# Patient Record
Sex: FEMALE | Race: BLACK OR AFRICAN AMERICAN | Employment: UNEMPLOYED | URBAN - METROPOLITAN AREA
[De-identification: names, ages, dates, MRNs, and addresses within clinical notes are randomized per-mention and may not be internally consistent; named-entity substitution may affect disease eponyms.]

---

## 2017-03-08 ENCOUNTER — APPOINTMENT (OUTPATIENT)
Dept: PHYSICAL THERAPY | Age: 52
End: 2017-03-08
Payer: COMMERCIAL

## 2017-03-13 ENCOUNTER — HOSPITAL ENCOUNTER (OUTPATIENT)
Dept: PHYSICAL THERAPY | Age: 52
Discharge: HOME | End: 2017-03-13
Payer: COMMERCIAL

## 2017-03-13 PROCEDURE — 97140 MANUAL THERAPY 1/> REGIONS: CPT

## 2017-03-13 PROCEDURE — 97162 PT EVAL MOD COMPLEX 30 MIN: CPT

## 2017-03-13 PROCEDURE — 97530 THERAPEUTIC ACTIVITIES: CPT

## 2017-03-13 PROCEDURE — 97110 THERAPEUTIC EXERCISES: CPT
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
## 2017-03-14 NOTE — PROGRESS NOTES
[facility] in [address] Geraldine [place] [address]  Phone: [phone]      Fax:  [phone] of Care/ Statement of Necessity for Physical Therapy Services       Patient name: Jamison Atkinson Start of Care: 3/13/2017   Referral source: Jadon Leone MD : 1965    Medical Diagnosis: Pain and swelling of left knee [M25.562, M25.462]   Onset Date:TKA , revision     Treatment Diagnosis: left knee pain   Prior Hospitalization: see medical history Provider#: [id number]   Medications: Verified on Patient summary List    Comorbidities: arthritis, latex allergy, high BP   Prior Level of Function: Normal function prior to fall and TKA in , since then limited function and mobility      The Plan of Care and following information is based on the information from the initial evaluation. Assessment/ key information: 54 YOF with chronic knee pain /dysfunction since  s/p TKA/revision/manipulation is presenting to PT with reports of constant pain ranging from 7-10/10, limited function (FOTO 18/100) and overall high pain focus. Objective findings include limited active/passive ROM, altered gait pattern without use of full ROM potential, deficit in strength and flexibility. Self reported FOTO score of 18/100 indicates marked limitation in function. Patient is a good candidate for skilled PT. Evaluation Complexity History MEDIUM  Complexity : 1-2 comorbidities / personal factors will impact the outcome/ POC ; Examination MEDIUM Complexity : 3 Standardized tests and measures addressing body structure, function, activity limitation and / or participation in recreation  ;Presentation MEDIUM Complexity : Evolving with changing characteristics  ; Clinical Decision Making HIGH Complexity : FOTO score of 1- 25   Overall Complexity Rating: MEDIUM  Problem List: decrease ROM, decrease strength, edema affecting function, impaired gait/ balance, decrease ADL/ functional abilitiies, decrease activity tolerance and decrease flexibility/ joint mobility   Treatment Plan may include any combination of the following: Therapeutic exercise, Therapeutic activities, Neuromuscular re-education, Physical agent/modality, Gait/balance training, Manual therapy and Patient education  Patient / Family readiness to learn indicated by: asking questions and trying to perform skills  Persons(s) to be included in education: patient (P)  Barriers to Learning/Limitations: None  Patient Goal (s): knee to bend  Patient Self Reported Health Status: fair  Rehabilitation Potential: good    Short Term Goals: To be accomplished in 3 weeks:   1. Patient has increased awareness of faulty gait pattern and is able to use current mobility to perform swing phase with knee Flex and heel strike with max knee Ext  Status at Eval: patient maintains 20 deg knee Flex during swing /stance phase limiting any functional mobility    2. Improved left knee ROM to >or= 15-90 deg for improved functional mobility with ADL  Status at Eval: AROM 20-80 deg left knee      Long Term Goals: To be accomplished in 6 weeks:   1. Improved FOTO score to >or= 47/100 points as evidence of improved function and ambulation  Status at Eval:FOTO 18/100    2. Improved strength LE to >or= 4/5 MMT for return to functional activities including housekeeping, stair climbing and limited squatting  Status at Eval:Left knee Ext 3-/5, Flex 4-/5    3. Patient reports reduction in pain with ADL and ambulation to <or= 5/10 for gradual return to PLOF  Status at Eval: constant pain ranging from 7-10/10    4. Improved left knee flexion to >lz=207 deg for increased ease with negotiation of curbs/stairs  Status at Eval: limited knee Flex, always leading with RLE to ascend stairs     Frequency / Duration: Patient to be seen 3 times per week for 6 weeks.     Patient/ Caregiver education and instruction: Diagnosis, prognosis, activity modification and exercises   [x] Plan of care has been reviewed with RA Israel, PT 3/13/2017 10:48 PM  _____________________________________________________________________  I certify that the above Therapy Services are being furnished while the patient is under my care. I agree with the treatment plan and certify that this therapy is necessary. Physician's Signature:____________________  Date:__________Time:______    Please sign and return to   [facility] in [address]  [address]  Phone: [phone]      Fax:  [phone]

## 2017-03-16 ENCOUNTER — HOSPITAL ENCOUNTER (OUTPATIENT)
Dept: PHYSICAL THERAPY | Age: 52
Discharge: HOME | End: 2017-03-16
Payer: COMMERCIAL

## 2017-03-16 PROCEDURE — 97140 MANUAL THERAPY 1/> REGIONS: CPT

## 2017-03-16 PROCEDURE — 97110 THERAPEUTIC EXERCISES: CPT

## 2017-03-17 ENCOUNTER — HOSPITAL ENCOUNTER (OUTPATIENT)
Dept: PHYSICAL THERAPY | Age: 52
Discharge: HOME | End: 2017-03-17
Payer: COMMERCIAL

## 2017-03-17 PROCEDURE — 97140 MANUAL THERAPY 1/> REGIONS: CPT

## 2017-03-17 PROCEDURE — 97110 THERAPEUTIC EXERCISES: CPT

## 2017-03-17 NOTE — PROGRESS NOTES
PT DAILY TREATMENT NOTE - [facility]     Patient Name: Inderjit Adames  Date:3/17/2017  : 1965  [x]  Patient  Verified  Payor: Min Parra / Plan: Taryn Ache / Product Type: HMO /    In time:255  Out time:340  Total Treatment Time (min): 45  Total Timed Codes (min): 45  1:1 Treatment Time ([address] only): na   Visit #: 3 of 18    Treatment Area: Pain and swelling of left knee [M25.562, M25.462]    SUBJECTIVE  Pain Level (0-10 scale): 7  Any medication changes, allergies to medications, adverse drug reactions, diagnosis change, or new procedure performed?: [x] No    [] Yes (see summary sheet for update)  Subjective functional status/changes:   [] No changes reported  Reports slightly less pain today, no change in mobility    OBJECTIVE        35 min Therapeutic Exercise:  [] See flow sheet :   Rationale: increase ROM, increase strength and improve coordination      10 min Manual Therapy:  Quad/hs pnf in sitting, STM to L quad and HS at end range flexion   Rationale: decrease pain, increase ROM and increase tissue extensibility       With   [] TE   [] TA   [] neuro   [] other: Patient Education: [x] Review HEP    [] Progressed/Changed HEP based on:   [] positioning   [] body mechanics   [] transfers   [] heat/ice application    [] other:      Other Objective/Functional Measures:   No increased symptoms with TE  Unable to make full revolution on bike       Pain Level (0-10 scale) post treatment: 5    ASSESSMENT/Changes in Function:   Good tolerance to TE and good response to MT with a decrease in symptoms. Patient will continue to benefit from skilled PT services to modify and progress therapeutic interventions, address functional mobility deficits, address ROM deficits and address strength deficits to attain remaining goals. [x]  See Plan of Care  []  See progress note/recertification  []  See Discharge Summary         Progress towards goals / Updated goals:  Short Term Goals:  To be accomplished in 3 weeks: 1. Patient has increased awareness of faulty gait pattern and is able to use current mobility to perform swing phase with knee Flex and heel strike with max knee Ext  Status at Eval: patient maintains 20 deg knee Flex during swing /stance phase limiting any functional mobility     2. Improved left knee ROM to >or= 15-90 deg for improved functional mobility with ADL  Status at Eval: AROM 20-80 deg left knee        Long Term Goals: To be accomplished in 6 weeks:  1. Improved FOTO score to >or= 47/100 points as evidence of improved function and ambulation  Status at Eval:FOTO 18/100     2. Improved strength LE to >or= 4/5 MMT for return to functional activities including housekeeping, stair climbing and limited squatting  Status at Eval:Left knee Ext 3-/5, Flex 4-/5     3. Patient reports reduction in pain with ADL and ambulation to <or= 5/10 for gradual return to PLOF  Status at Eval: constant pain ranging from 7-10/10     4.  Improved left knee flexion to >bp=637 deg for increased ease with negotiation of curbs/stairs  Status at Eval: limited knee Flex, always leading with RLE to ascend stairs        PLAN  [x]  Upgrade activities as tolerated     [x]  Continue plan of care  []  Update interventions per flow sheet       []  Discharge due to:_  []  Other:_      Mikayla Anderson PTA 3/17/2017  3:51 PM    Future Appointments  Date Time Provider Roland Hoff   3/20/2017 11:00 AM RA Basilio [facility]   3/21/2017 10:00 AM BEBETO Garcia [facility]   3/24/2017 11:00 AM RA Basilio [facility]   3/28/2017 11:00 AM RA Basilio [facility]   3/29/2017 3:00 PM BEBETO Garcia [facility]   3/31/2017 11:00 AM RA Basilio [facility]

## 2017-03-20 ENCOUNTER — HOSPITAL ENCOUNTER (OUTPATIENT)
Dept: PHYSICAL THERAPY | Age: 52
Discharge: HOME | End: 2017-03-20
Payer: COMMERCIAL

## 2017-03-20 PROCEDURE — 97110 THERAPEUTIC EXERCISES: CPT

## 2017-03-20 PROCEDURE — 97140 MANUAL THERAPY 1/> REGIONS: CPT

## 2017-03-20 NOTE — PROGRESS NOTES
PT DAILY TREATMENT NOTE - [facility]     Patient Name: Don Castillo  Date:3/20/2017  : 1965  [x]  Patient  Verified  Payor: Krissy Kaur / Plan: Gregory Guerra / Product Type: HMO /    In time:1025  Out time:1120  Total Treatment Time (min): 55  Total Timed Codes (min): 55  1:1 Treatment Time ( only): na   Visit #: 4 of 18    Treatment Area: Pain and swelling of left knee [M25.562, M25.462]    SUBJECTIVE  Pain Level (0-10 scale): 0  Any medication changes, allergies to medications, adverse drug reactions, diagnosis change, or new procedure performed?: [x] No    [] Yes (see summary sheet for update)  Subjective functional status/changes:   [] No changes reported  Very active over the weekend, stiffness continues but has no pain    OBJECTIVE      45 min Therapeutic Exercise:  [x] See flow sheet :   Rationale: increase ROM, increase strength and improve coordination     10 min Manual Therapy:  Functional massage to L quad and HS in sitting   Rationale: increase ROM and increase tissue extensibility           With   [] TE   [] TA   [] neuro   [] other: Patient Education: [x] Review HEP    [] Progressed/Changed HEP based on:   [] positioning   [] body mechanics   [] transfers   [] heat/ice application    [] other:      Other Objective/Functional Measures:   PROM flex ~90deg       Pain Level (0-10 scale) post treatment: 0    ASSESSMENT/Changes in Function:   Progressing well with decrease of pain. Shows more normalized gait pattern with minimal deviations. Limitations in overall ROM persist    Patient will continue to benefit from skilled PT services to modify and progress therapeutic interventions, address functional mobility deficits, address ROM deficits and address strength deficits to attain remaining goals. [x]  See Plan of Care  []  See progress note/recertification  []  See Discharge Summary         Progress towards goals / Updated goals:  Short Term Goals: To be accomplished in 3 weeks:  1. Patient has increased awareness of faulty gait pattern and is able to use current mobility to perform swing phase with knee Flex and heel strike with max knee Ext  Status at Eval: patient maintains 20 deg knee Flex during swing /stance phase limiting any functional mobility  Current: pt freely moves out of flexed position      2. Improved left knee ROM to >or= 15-90 deg for improved functional mobility with ADL  Status at Eval: AROM 20-80 deg left knee  Current: AROM 90deg flex          Long Term Goals: To be accomplished in 6 weeks:  1. Improved FOTO score to >or= 47/100 points as evidence of improved function and ambulation  Status at Eval:FOTO 18/100  Current: NT      2. Improved strength LE to >or= 4/5 MMT for return to functional activities including housekeeping, stair climbing and limited squatting  Status at Eval:Left knee Ext 3-/5, Flex 4-/5  Current: NT      3. Patient reports reduction in pain with ADL and ambulation to <or= 5/10 for gradual return to PLOF  Status at Eval: constant pain ranging from 7-10/10     current: 0/10    4.  Improved left knee flexion to >vh=973 deg for increased ease with negotiation of curbs/stairs  Status at Eval: limited knee Flex, always leading with RLE to ascend stairs   Current:NT    PLAN  [x]  Upgrade activities as tolerated     [x]  Continue plan of care  []  Update interventions per flow sheet       []  Discharge due to:_  []  Other:_      Peace Asencio PTA 3/20/2017  11:28 AM    Future Appointments  Date Time Provider Roland Hoff   3/24/2017 11:00 AM RA Castañeda [facility]   3/28/2017 11:00 AM RA Castañeda [facility]   3/29/2017 3:00 PM Anette Hetty Castleman MIHPTD [facility]   3/31/2017 11:00 AM RA Castañeda [facility]

## 2017-03-21 ENCOUNTER — APPOINTMENT (OUTPATIENT)
Dept: PHYSICAL THERAPY | Age: 52
End: 2017-03-21
Payer: COMMERCIAL

## 2017-03-24 ENCOUNTER — HOSPITAL ENCOUNTER (OUTPATIENT)
Dept: PHYSICAL THERAPY | Age: 52
Discharge: HOME | End: 2017-03-24
Payer: COMMERCIAL

## 2017-03-24 PROCEDURE — 97110 THERAPEUTIC EXERCISES: CPT

## 2017-03-24 PROCEDURE — 97140 MANUAL THERAPY 1/> REGIONS: CPT

## 2017-03-24 NOTE — PROGRESS NOTES
PT DAILY TREATMENT NOTE - [facility]     Patient Name: Ariel Jeffers  Date:3/24/2017  : 1965  [x]  Patient  Verified  Payor: Laura Pineda / Plan: Bradley Massey / Product Type: HMO /    In time:115  Out time:1205  Total Treatment Time (min): 50  Total Timed Codes (min): 50  1:1 Treatment Time ( only): na   Visit #: 5 of 18    Treatment Area: Pain and swelling of left knee [M25.562, M25.462]    SUBJECTIVE  Pain Level (0-10 scale): 4  Any medication changes, allergies to medications, adverse drug reactions, diagnosis change, or new procedure performed?: [x] No    [] Yes (see summary sheet for update)  Subjective functional status/changes:   [] No changes reported  Reports improvement in function and decrease in pain but limitations persist on ROM along with intermittent muscle spasms    OBJECTIVE    40 min Therapeutic Exercise:  [] See flow sheet :   Rationale: increase ROM, increase strength and improve coordination      10 min Manual Therapy:  functional massage to L quad in sitting. Distraction to L knee in sititng   Rationale: decrease pain, increase ROM and increase tissue extensibility           With   [] TE   [] TA   [] neuro   [] other: Patient Education: [x] Review HEP    [] Progressed/Changed HEP based on:   [] positioning   [] body mechanics   [] transfers   [] heat/ice application    [] other:      Other Objective/Functional Measures:   AROM/PROM  80-85 deg flex  -20 to -15 Ext  foto 44/100  Seat 15 1/2 on bike  -       Pain Level (0-10 scale) post treatment: 2    ASSESSMENT/Changes in Function:   Min change in ROM. improved gait pattern and general mobility with TE    Patient will continue to benefit from skilled PT services to modify and progress therapeutic interventions, address functional mobility deficits, address ROM deficits and address strength deficits to attain remaining goals.      [x]  See Plan of Care  []  See progress note/recertification  []  See Discharge Summary Progress towards goals / Updated goals:  Short Term Goals: To be accomplished in 3 weeks:  1. Patient has increased awareness of faulty gait pattern and is able to use current mobility to perform swing phase with knee Flex and heel strike with max knee Ext  Status at Eval: patient maintains 20 deg knee Flex during swing /stance phase limiting any functional mobility  Current: pt freely moves out of flexed position      2. Improved left knee ROM to >or= 15-90 deg for improved functional mobility with ADL  Status at Eval: AROM 20-80 deg left knee  Current: PROM -15 to 85deg          Long Term Goals: To be accomplished in 6 weeks:  1. Improved FOTO score to >or= 47/100 points as evidence of improved function and ambulation  Status at Eval:FOTO 18/100  Current: 44/100      2. Improved strength LE to >or= 4/5 MMT for return to functional activities including housekeeping, stair climbing and limited squatting  Status at Eval:Left knee Ext 3-/5, Flex 4-/5  Current: NT      3. Patient reports reduction in pain with ADL and ambulation to <or= 5/10 for gradual return to PLOF  Status at Eval: constant pain ranging from 7-10/10     current: 0-4/10     4.  Improved left knee flexion to >je=230 deg for increased ease with negotiation of curbs/stairs  Status at Eval: limited knee Flex, always leading with RLE to ascend stairs   Current:NT       PLAN  [x]  Upgrade activities as tolerated     [x]  Continue plan of care  []  Update interventions per flow sheet       []  Discharge due to:_  []  Other:_      Sachin Ely, PTA 3/24/2017  12:20 PM    Future Appointments  Date Time Provider Roland Hoff   3/28/2017 11:00 AM BEBETO Garcia [facility]   3/29/2017 3:00 PM BEBETO Garcia [facility]   3/31/2017 11:00 AM BEBETO Garcia [facility]

## 2017-03-28 ENCOUNTER — APPOINTMENT (OUTPATIENT)
Dept: PHYSICAL THERAPY | Age: 52
End: 2017-03-28
Payer: COMMERCIAL

## 2017-03-29 ENCOUNTER — APPOINTMENT (OUTPATIENT)
Dept: PHYSICAL THERAPY | Age: 52
End: 2017-03-29
Payer: COMMERCIAL

## 2017-03-31 ENCOUNTER — APPOINTMENT (OUTPATIENT)
Dept: PHYSICAL THERAPY | Age: 52
End: 2017-03-31
Payer: COMMERCIAL

## 2017-04-03 ENCOUNTER — HOSPITAL ENCOUNTER (OUTPATIENT)
Dept: PHYSICAL THERAPY | Age: 52
Discharge: HOME | End: 2017-04-03
Payer: COMMERCIAL

## 2017-04-03 PROCEDURE — 97110 THERAPEUTIC EXERCISES: CPT

## 2017-04-03 NOTE — PROGRESS NOTES
PT DAILY TREATMENT NOTE - [facility]     Patient Name: Henry Simmons  Date:4/3/2017  : 1965  [x]  Patient  Verified  Payor: Terrance Bryant / Plan: Martha Fierro / Product Type: HMO /    In time:930  Out time:1015  Total Treatment Time (min): 45  Total Timed Codes (min): 45  1:1 Treatment Time ([address] only): na   Visit #: 6 of 18    Treatment Area: Pain and swelling of left knee [M25.562, M25.462]    SUBJECTIVE  Pain Level (0-10 scale): 5  Any medication changes, allergies to medications, adverse drug reactions, diagnosis change, or new procedure performed?: [x] No    [] Yes (see summary sheet for update)  Subjective functional status/changes:   [] No changes reported  Reports increase of pain due to in activity. \"I was stuck in a class room all week\"    OBJECTIVE       45 min Therapeutic Exercise:  [] See flow sheet :   Rationale: increase ROM, increase strength and improve coordination              With   [] TE   [] TA   [] neuro   [] other: Patient Education: [x] Review HEP    [] Progressed/Changed HEP based on:   [] positioning   [] body mechanics   [] transfers   [] heat/ice application    [] other:      Other Objective/Functional Measures:   none     Pain Level (0-10 scale) post treatment: 0    ASSESSMENT/Changes in Function:   Deficit in hip abd strength as patient compensates during lateral step ups. Patient will continue to benefit from skilled PT services to modify and progress therapeutic interventions, address functional mobility deficits, address ROM deficits and address strength deficits to attain remaining goals. [x]  See Plan of Care  []  See progress note/recertification  []  See Discharge Summary         Progress towards goals / Updated goals:  Short Term Goals: To be accomplished in 3 weeks:  1.  Patient has increased awareness of faulty gait pattern and is able to use current mobility to perform swing phase with knee Flex and heel strike with max knee Ext  Status at Eval: patient maintains 20 deg knee Flex during swing /stance phase limiting any functional mobility  Current: pt freely moves out of flexed position      2. Improved left knee ROM to >or= 15-90 deg for improved functional mobility with ADL  Status at Eval: AROM 20-80 deg left knee  Current: PROM -15 to 85deg          Long Term Goals: To be accomplished in 6 weeks:  1. Improved FOTO score to >or= 47/100 points as evidence of improved function and ambulation  Status at Eval:FOTO 18/100  Current: 44/100      2. Improved strength LE to >or= 4/5 MMT for return to functional activities including housekeeping, stair climbing and limited squatting  Status at Eval:Left knee Ext 3-/5, Flex 4-/5  Current: NT      3. Patient reports reduction in pain with ADL and ambulation to <or= 5/10 for gradual return to PLOF  Status at Eval: constant pain ranging from 7-10/10     current: 0-4/10      4.  Improved left knee flexion to >cw=957 deg for increased ease with negotiation of curbs/stairs  Status at Eval: limited knee Flex, always leading with RLE to ascend stairs   Current:NT       PLAN  [x]  Upgrade activities as tolerated     [x]  Continue plan of care  []  Update interventions per flow sheet       []  Discharge due to:_  []  Other:_      Maame Davis, RA 4/3/2017  9:47 AM    Future Appointments  Date Time Provider Roland Hoff   4/6/2017 8:00 AM Alfredo Scott, PT [facility]

## 2017-04-06 ENCOUNTER — HOSPITAL ENCOUNTER (OUTPATIENT)
Dept: PHYSICAL THERAPY | Age: 52
Discharge: HOME | End: 2017-04-06
Payer: COMMERCIAL

## 2017-04-06 PROCEDURE — 97110 THERAPEUTIC EXERCISES: CPT

## 2017-04-06 PROCEDURE — 97112 NEUROMUSCULAR REEDUCATION: CPT

## 2017-04-06 NOTE — PROGRESS NOTES
PT DAILY TREATMENT NOTE     Patient Name: Leif Larios  Date:2017  : 1965  [x]  Patient  Verified  Payor: Aguila Villarreal / Plan: Elizabeth Bustillo / Product Type: HMO /    In time:8:04  Out time:9:10  Total Treatment Time (min): 66  Visit #: 7 of 18    Treatment Area: Pain and swelling of left knee [M25.562, M25.462]    SUBJECTIVE  Pain Level (0-10 scale): 7/10  Any medication changes, allergies to medications, adverse drug reactions, diagnosis change, or new procedure performed?: [x] No    [] Yes (see summary sheet for update)  Subjective functional status/changes:   [] No changes reported  [facility] fell 2x in  and had a patient kick my left knee several times after surgery. Revision of left TKR in May 2016. \"    OBJECTIVE    Modality rationale: decrease edema, decrease inflammation and decrease pain to improve the patients ability to tolerate positions and ADLs.    Min Type Additional Details    [] Estim:  []Unatt       []IFC  []Premod                        []Other:  []w/ice   []w/heat  Position:  Location:    [] Estim: []Att    []TENS instruct  []NMES                    []Other:  []w/US   []w/ice   []w/heat  Position:  Location:    []  Traction: [] Cervical       []Lumbar                       [] Prone          []Supine                       []Intermittent   []Continuous Lbs:  [] before manual  [] after manual    []  Ultrasound: []Continuous   [] Pulsed                           []1MHz   []3MHz W/cm2:  Location:    []  Iontophoresis with dexamethasone         Location: [] Take home patch   [] In clinic   10 [x]  Ice     []  heat  []  Ice massage  []  Laser   []  Anodyne Position:   Location:    []  Laser with stim  []  Other:  Position:  Location:    []  Vasopneumatic Device Pressure:       [] lo [] med [] hi   Temperature: [] lo [] med [] hi   [] Skin assessment post-treatment:  []intact []redness- no adverse reaction    []redness - adverse reaction:      min []Eval []Re-Eval       26 min Therapeutic Exercise:  [x] See flow sheet :  Increased load of leg press to 40 lbs. Rationale: increase ROM, increase strength, improve coordination and increase proprioception to improve the patients ability to normalize ADLs. min Therapeutic Activity:  []  See flow sheet :        30 min Neuromuscular Re-education:  [x]  See flow sheet :  Assessment of left knee neuromuscular dysfunction tender points and treatment of PAT (5:00, 6:00), MM, LH tender points using strain counterstrain. Rationale: improve coordination, increase proprioception and decrease pain  to improve the patients ability to tolerate positions and ADLs. min Manual Therapy:          min Gait Training:  ___ feet with ___ device on level surfaces with ___ level of assist   Rationale: With   [] TE   [] TA   [] neuro   [] other: Patient Education: [x] Review HEP    [] Progressed/Changed HEP based on:   [] positioning   [] body mechanics   [] transfers   [] heat/ice application    [] other:      Other Objective/Functional Measures:   (+) neuromuscular dysfunction tender points at PAT (5:00*, 6:00*), MM*, LM, LH*, PCL, medial ACL, lateral EXA, PES, lateral PTE      Pain Level (0-10 scale) post treatment: 0/10    ASSESSMENT/Changes in Function:   Abolished MM, PAT (6:00, 5:00) tender point, 30% reduction LH tender point, PES abolished after treating MM, LH tender points. Pt's pain abolished after strain counterstrain and exercises. Patient will continue to benefit from skilled PT services to modify and progress therapeutic interventions, address functional mobility deficits, address ROM deficits, address strength deficits, analyze and address soft tissue restrictions, analyze and cue movement patterns, analyze and modify body mechanics/ergonomics and instruct in home and community integration to attain remaining goals.      []  See Plan of Care  []  See progress note/recertification  []  See Discharge Summary         Progress towards goals / Updated goals:  Short Term Goals: To be accomplished in 3 weeks:  1. Patient has increased awareness of faulty gait pattern and is able to use current mobility to perform swing phase with knee Flex and heel strike with max knee Ext  Status at Eval: patient maintains 20 deg knee Flex during swing /stance phase limiting any functional mobility  Current: pt freely moves out of flexed position      2. Improved left knee ROM to >or= 15-90 deg for improved functional mobility with ADL  Status at Eval: AROM 20-80 deg left knee  Current: PROM -15 to 85deg          Long Term Goals: To be accomplished in 6 weeks:  1. Improved FOTO score to >or= 47/100 points as evidence of improved function and ambulation  Status at Eval: FOTO 18/100  Current: FOTO: 44/100      2. Improved strength LE to >or= 4/5 MMT for return to functional activities including housekeeping, stair climbing and limited squatting  Status at Eval:Left knee Ext 3-/5, Flex 4-/5  Current: NT      3. Patient reports reduction in pain with ADL and ambulation to <or= 5/10 for gradual return to PLOF  Status at Eval: constant pain ranging from 7-10/10  Current: 0-4/10      4.  Improved left knee flexion to >rj=314 deg for increased ease with negotiation of curbs/stairs  Status at Eval: limited knee Flex, always leading with RLE to ascend stairs   Current:NT    PLAN  []  Upgrade activities as tolerated     []  Continue plan of care  []  Update interventions per flow sheet       []  Discharge due to:_  []  Other:_      Kary Garcia PT 4/6/2017  8:09 AM    Future Appointments  Date Time Provider Roland Hoff   4/11/2017 4:30 PM BEBETO Brito [facility]   4/13/2017 4:30 PM BEBETO Garcia [facility]

## 2017-04-11 ENCOUNTER — HOSPITAL ENCOUNTER (OUTPATIENT)
Dept: PHYSICAL THERAPY | Age: 52
Discharge: HOME | End: 2017-04-11
Payer: COMMERCIAL

## 2017-04-11 PROCEDURE — 97164 PT RE-EVAL EST PLAN CARE: CPT

## 2017-04-11 PROCEDURE — 97112 NEUROMUSCULAR REEDUCATION: CPT

## 2017-04-11 NOTE — PROGRESS NOTES
PT DAILY TREATMENT NOTE     Patient Name: Wendy Evans  Date:2017  : 1965  [x]  Patient  Verified  Payor: Breana Sequeira / Plan: Juan A Ohara / Product Type: HMO /    In time:4:40  Out time:5:55  Total Treatment Time (min): 75  Visit #: 8 of 18    Treatment Area: Pain and swelling of left knee [M25.562, M25.462]    SUBJECTIVE  Pain Level (0-10 scale): 8/10  Any medication changes, allergies to medications, adverse drug reactions, diagnosis change, or new procedure performed?: [x] No    [] Yes (see summary sheet for update)  Subjective functional status/changes:   [] No changes reported  \"I had a total knee replacement on this knee 2 years ago and it still hurts. I get these spasms in my knee that always come and go and they're really bad today.      OBJECTIVE    Modality rationale:    Min Type Additional Details    [] Estim:  []Unatt       []IFC  []Premod                        []Other:  []w/ice   []w/heat  Position:  Location:    [] Estim: []Att    []TENS instruct  []NMES                    []Other:  []w/US   []w/ice   []w/heat  Position:  Location:    []  Traction: [] Cervical       []Lumbar                       [] Prone          []Supine                       []Intermittent   []Continuous Lbs:  [] before manual  [] after manual    []  Ultrasound: []Continuous   [] Pulsed                           []1MHz   []3MHz W/cm2:  Location:    []  Iontophoresis with dexamethasone         Location: [] Take home patch   [] In clinic    []  Ice     []  heat  []  Ice massage  []  Laser   []  Anodyne Position:  Location:    []  Laser with stim  []  Other:  Position:  Location:    []  Vasopneumatic Device Pressure:       [] lo [] med [] hi   Temperature: [] lo [] med [] hi   [] Skin assessment post-treatment:  []intact []redness- no adverse reaction    []redness - adverse reaction:     30 min []Eval                  [x]Re-Eval  Discussed positioning and pt's knee surgery history and continuing plan of care.       5 min Therapeutic Exercise:  [x] See flow sheet :   Rationale: increase ROM and increase strength to improve the patients ability to bend her knee. min Therapeutic Activity:  []  See flow sheet :        42 min Neuromuscular Re-education:  [x]  See flow sheet :  Assessment of left knee neuromuscular dysfunction tender points and treatment of lateral PTE, PCL, LM, medial ACL, and lateral EXA tender points using strain counterstrain. Rationale: increase proprioception and decrease pain  to improve the patients ability to tolerate positions and ADLs. min Manual Therapy:          min Gait Training:  ___ feet with ___ device on level surfaces with ___ level of assist   Rationale: With   [] TE   [] TA   [] neuro   [] other: Patient Education: [x] Review HEP    [] Progressed/Changed HEP based on:   [] positioning   [] body mechanics   [] transfers   [] heat/ice application    [] other:      Other Objective/Functional Measures:   (+) neuromuscular dysfunction tender points at PAT (5:00*, 6:00*), MM*, LM*, LH*, PCL*, medial ACL*, lateral EXA*, PES, lateral PTE*  left LE shortens in sitting- posteriorly rotated right innominate  right standing flexion test  PROM left knee: 0-75 degrees  AROM knee extension: -7 degrees  Strength hip flexors: 4/5 (improved 1 grade on left, no change on right)  Hip extensors (sitting): 5/5 bilaterally  Hip adductors: 5/5 bilaterally  Hip abductors: 5/5 bilaterally  Knee extension: 4+/5 (improved 1 2/3 grade on left)  Knee flexors: 3/5  Ankle DF: 5/5       Pain Level (0-10 scale) post treatment: 1-2/10    ASSESSMENT/Changes in Function:    Abolished lateral PTE, PCL, LM, medial ACL tender points, 80-90% reduction of lateral EXA tender point after strain counterstrain today.   Pt's AROM/PROM of the left knee flexion continues to be significantly limited due to pain, but her knee extension ROM has significantly improved, but with pain at end of knee extension ROM (PROM left knee: 0-75 degrees). Multiple neuromuscular dysfunction tender points were successfully treated using strain counterstrain allowing pt to experience up to 3 days of abolished knee pain after treating approximately 50% of the neuromuscular dysfunctions. All neuromuscular dysfunction tender points were successfully treated at the time of this progress note resulting in a left knee pain reduction from 8/10 to 0-1/10. Pt would benefit from additional skilled PT to address remaining strength and ROM deficits. Patient will continue to benefit from skilled PT services to modify and progress therapeutic interventions, address functional mobility deficits, address ROM deficits, address strength deficits, analyze and address soft tissue restrictions, analyze and cue movement patterns, analyze and modify body mechanics/ergonomics, assess and modify postural abnormalities and instruct in home and community integration to attain remaining goals. []  See Plan of Care  []  See progress note/recertification  []  See Discharge Summary         Progress towards goals / Updated goals:  Short Term Goals: To be accomplished in 3 weeks:  1. Patient has increased awareness of faulty gait pattern and is able to use current mobility to perform swing phase with knee Fl ex and heel strike with max knee Ext  Status at Eval: patient maintains 20 deg knee Flex during swing /stance phase limiting any functional mobility  Current: progressing (pt freely moves out of flexed position)      2. Improved left knee ROM to >or= 15-90 deg for improved functional mobility with ADL  Status at Eval: AROM 20-80 deg left knee  Current: met for knee extension, regressed for knee flexion (PROM left knee: 0-75 degrees) 4/11/17      Long Term Goals: To be accomplished in 6 weeks:  1. Improved FOTO score to >or= 47/100 points as evidence of improved function and ambulation  Status at Eval: FOTO 18/100  Current: FOTO: 44/100      2.  Improved strength LE to >or= 4/5 MMT for return to functional activities including housekeeping, stair climbing and limited squatting  Status at Eval:Left knee Ext 3-/5, Flex 4-/5  Current: progressing (Strength hip flexors: 4/5 (improved 1 grade on left, no change on right), Hip extensors (sitting): 5/5 bilaterally, Hip adductors: 5/5 bilaterally, Hip abductors: 5/5 bilaterally, Knee extension: 4+/5 (improved 1 2/3 grade on left), Knee flexors: 3/5, Ankle DF: 5/5) 4/11/17      3. Patient reports reduction in pain with ADL and ambulation to <or= 5/10 for gradual return to PLOF  Status at Eval: constant pain ranging from 7-10/10  Current: progressing (pain 0-4/10 at worst)      4.  Improved left knee flexion to >tz=791 deg for increased ease with negotiation of curbs/stairs  Status at Eval: limited knee Flex, always leading with RLE to ascend stairs   Current: regressed (PROM left knee: 0-75 degrees) 4/11/17    PLAN  []  Upgrade activities as tolerated     [x]  Continue plan of care  []  Update interventions per flow sheet       []  Discharge due to:_  []  Other:_      Cordelia Kaba PT 4/11/2017  4:49 PM    Future Appointments  Date Time Provider Roland Hoff   4/13/2017 4:30 PM Vandana Flaherty, PT [facility]

## 2017-04-11 NOTE — PROGRESS NOTES
[facility] in [address] [place] [address]  Phone: [phone] Fax: [phone]    Physical Therapy Progress Note  Patient name: Jersey Gamino Start of Care: 3/13/17   Referral source: Jolly Bhardwaj MD : 1965   Medical/Treatment Diagnosis: Pain and swelling of left knee [M25.562, M25.462] S/P TKR revision Onset Date: TKA , revision    Prior Hospitalization: see medical history Provider#: [id number]   Medications: Verified on Patient Summary List    Comorbidities: arthritis, latex allergy, high BP  Prior Level of Function: Normal function prior to fall and TKA in , since then limited function and mobility  Visits from Start of Care: 8     Missed Visits: 0    Established Goals:         Excellent           Good         Limited           None  [x] Increased ROM   []  [x] (extension)[x] (flexion) []  [x] Increased Strength  []  [x]  []  []  [x] Increased Mobility  []  [x]  [x]  []   [x] Decreased Pain   []  [x]  [x]  []  [] Decreased Swelling  []  []  []  []    Key Functional Changes:   Abolished lateral PTE, PCL, LM, medial ACL tender points, 80-90% reduction of lateral EXA tender point after strain counterstrain today. Pt's AROM/PROM of the left knee flexion continues to be significantly limited due to pain, but her knee extension ROM has significantly improved, but with pain at end of knee extension ROM (PROM left knee: 0-75 degrees). Multiple neuromuscular dysfunction tender points were successfully treated using strain counterstrain allowing pt to experience up to 3 days of abolished knee pain after treating approximately 50% of the neuromuscular dysfunctions. All neuromuscular dysfunction tender points were successfully treated at the time of this progress note resulting in a left knee pain reduction from 8/10 to 0-1/10. Goals addressed this period:  Short Term Goals: To be accomplished in 3 weeks:  1.  Patient has increased awareness of faulty gait pattern and is able to use current mobility to perform swing phase with knee Fl ex and heel strike with max knee Ext  Status at Eval: patient maintains 20 deg knee Flex during swing /stance phase limiting any functional mobility  Current: progressing (pt freely moves out of flexed position)      2. Improved left knee ROM to >or= 15-90 deg for improved functional mobility with ADL  Status at Eval: AROM 20-80 deg left knee  Current: met for knee extension, regressed for knee flexion (PROM left knee: 0-75 degrees)      Long Term Goals: To be accomplished in 6 weeks:  1. Improved FOTO score to >or= 47/100 points as evidence of improved function and ambulation  Status at Eval: FOTO 18/100  Current: nearly met (FOTO: 44/100)      2. Improved strength LE to >or= 4/5 MMT for return to functional activities including housekeeping, stair climbing and limited squatting  Status at Eval:Left knee Ext 3-/5, Flex 4-/5  Current: progressing (Strength hip flexors: 4/5 (improved 1 grade on left, no change on right), Hip extensors (sitting): 5/5 bilaterally, Hip adductors: 5/5 bilaterally, Hip abductors: 5/5 bilaterally, Knee extension: 4+/5 (improved 1 2/3 grade on left), Knee flexors: 3/5, Ankle DF: 5/5)      3. Patient reports reduction in pain with ADL and ambulation to <or= 5/10 for gradual return to PLOF  Status at Eval: constant pain ranging from 7-10/10  Current: progressing (pain 0-4/10 at worst)      4. Improved left knee flexion to >sm=909 deg for increased ease with negotiation of curbs/stairs  Status at Eval: limited knee Flex, always leading with RLE to ascend stairs   Current: regressed (PROM left knee: 0-75 degrees)    Updated Goals: to be achieved in 4 weeks:   1) Continue with unmet goals above. ASSESSMENT/RECOMMENDATIONS:   Pt would benefit from additional skilled PT to address remaining strength and ROM deficits.     [x]Continue therapy per initial plan/protocol at a frequency of  2 x per week for 4 weeks  []Continue therapy with the following recommended changes:_____________________      _____________________________________________________________________  []Discontinue therapy progressing towards or have reached established goals  []Discontinue therapy due to lack of appreciable progress towards goals  []Discontinue therapy due to lack of attendance or compliance  []Await Physician's recommendations/decisions regarding therapy  []Other:________________________________________________________________    Thank you for this referral.   Boyd Tomas, PT 4/11/2017 7:20 PM    NOTE TO PHYSICIAN:  PLEASE COMPLETE THE ORDERS BELOW AND   FAX TO [facility]: [phone]  If you are unable to process this request in 24 hours please contact our office: [phone]    ____ I have read the above report and request that my patient continue therapy with the following changes/special instructions:  ____ I have read the above report and request that my patient be discharged from therapy    Physician's Signature:_____________________ Date:___________Time:__________

## 2017-04-13 ENCOUNTER — HOSPITAL ENCOUNTER (OUTPATIENT)
Dept: PHYSICAL THERAPY | Age: 52
Discharge: HOME | End: 2017-04-13
Payer: COMMERCIAL

## 2017-04-13 PROCEDURE — 97110 THERAPEUTIC EXERCISES: CPT

## 2017-04-13 PROCEDURE — 97140 MANUAL THERAPY 1/> REGIONS: CPT

## 2017-04-13 PROCEDURE — 97530 THERAPEUTIC ACTIVITIES: CPT

## 2017-04-13 NOTE — PROGRESS NOTES
PT DAILY TREATMENT NOTE     Patient Name: Jonathan Troncoso  Date:2017  : 1965  [x]  Patient  Verified  Payor: Clotilda Meckel / Plan: Gustavo Hamm / Product Type: HMO /    In time:4:40  Out time:5:25  Total Treatment Time (min): 45  Visit #:  of 18    Treatment Area: Pain and swelling of left knee [M25.562, M25.462]    SUBJECTIVE  Pain Level (0-10 scale): 7/10 herb anterior knee  Any medication changes, allergies to medications, adverse drug reactions, diagnosis change, or new procedure performed?: [x] No    [] Yes (see summary sheet for update)  Subjective functional status/changes:   [x] No changes reported  Reports increased pain left knee after last visit, pain up to 10/10 hindering functional mobility. Reports increased stiffness. MD visit on Mo 17 for 2nd opinion. He has done all kind of testing including bone scans, blood work.     OBJECTIVE    Modality rationale:    Min Type Additional Details    [] Estim:  []Unatt       []IFC  []Premod                        []Other:  []w/ice   []w/heat  Position:  Location:    [] Estim: []Att    []TENS instruct  []NMES                    []Other:  []w/US   []w/ice   []w/heat  Position:  Location:    []  Traction: [] Cervical       []Lumbar                       [] Prone          []Supine                       []Intermittent   []Continuous Lbs:  [] before manual  [] after manual    []  Ultrasound: []Continuous   [] Pulsed                           []1MHz   []3MHz W/cm2:  Location:    []  Iontophoresis with dexamethasone         Location: [] Take home patch   [] In clinic    []  Ice     []  heat  []  Ice massage  []  Laser   []  Anodyne Position:  Location:    []  Laser with stim  []  Other:  Position:  Location:    []  Vasopneumatic Device Pressure:       [] lo [] med [] hi   Temperature: [] lo [] med [] hi   [] Skin assessment post-treatment:  []intact []redness- no adverse reaction    []redness - adverse reaction:      min []Eval []Re-Eval         15 min Therapeutic Exercise:  [x] See flow sheet :   Rationale: increase ROM and increase strength to improve the patients ability to bend her knee. 15 min Therapeutic Activity:  [x]  See flow sheet :updated HEP, trial of prone plank /only able to perform partially due to strength deficit         min Neuromuscular Re-education:  [x]  See flow sheet :     Rationale: increase proprioception and decrease pain  to improve the patients ability to tolerate positions and ADLs. 15 min Manual Therapy: functional massage left knee in seated position to improve functional mobility and pain    Rationale: pain control, increased ease with ADL/ambulation     min Gait Training:  ___ feet with ___ device on level surfaces with ___ level of assist   Rationale: With   [] TE   [] TA   [] neuro   [] other: Patient Education: [x] Review HEP    [] Progressed/Changed HEP based on:   [] positioning   [] body mechanics   [] transfers   [] heat/ice application    [] other:      Other Objective/Functional Measures:   Unable to perform proper plank due to strength deficit in core and LE's  Tightness with prone knee Flex left  Seated knee Flex to 80 deg  TTP throughout anterior. posterior knee  FOTO 44/100       Pain Level (0-10 scale) post treatment: 0/10    ASSESSMENT/Changes in Function:   Continues with ROM deficits, reduction in pain after MT     Patient will continue to benefit from skilled PT services to modify and progress therapeutic interventions, address functional mobility deficits, address ROM deficits, address strength deficits, analyze and address soft tissue restrictions, analyze and cue movement patterns, analyze and modify body mechanics/ergonomics, assess and modify postural abnormalities and instruct in home and community integration to attain remaining goals.      [x]  See Plan of Care  []  See progress note/recertification  []  See Discharge Summary         Progress towards goals / Updated goals:  Short Term Goals: To be accomplished in 3 weeks:  1. Patient has increased awareness of faulty gait pattern and is able to use current mobility to perform swing phase with knee Fl ex and heel strike with max knee Ext  Status at Eval: patient maintains 20 deg knee Flex during swing /stance phase limiting any functional mobility  Current: progressing (pt freely moves out of flexed position)      2. Improved left knee ROM to >or= 15-90 deg for improved functional mobility with ADL  Status at Eval: AROM 20-80 deg left knee  Current: met for knee extension, regressed for knee flexion (PROM left knee: 0-75 degrees) 4/11/17      Long Term Goals: To be accomplished in 6 weeks:  1. Improved FOTO score to >or= 47/100 points as evidence of improved function and ambulation  Status at Eval: FOTO 18/100  Current: FOTO: 44/100      2. Improved strength LE to >or= 4/5 MMT for return to functional activities including housekeeping, stair climbing and limited squatting  Status at Eval:Left knee Ext 3-/5, Flex 4-/5  Current: progressing (Strength hip flexors: 4/5 (improved 1 grade on left, no change on right), Hip extensors (sitting): 5/5 bilaterally, Hip adductors: 5/5 bilaterally, Hip abductors: 5/5 bilaterally, Knee extension: 4+/5 (improved 1 2/3 grade on left), Knee flexors: 3/5, Ankle DF: 5/5) 4/11/17      3. Patient reports reduction in pain with ADL and ambulation to <or= 5/10 for gradual return to PLOF  Status at Eval: constant pain ranging from 7-10/10  Current: progressing (pain 0-4/10 at worst)      4.  Improved left knee flexion to >ve=073 deg for increased ease with negotiation of curbs/stairs  Status at Eval: limited knee Flex, always leading with RLE to ascend stairs   Current: regressed (PROM left knee: 0-75 degrees) 4/11/17    PLAN  []  Upgrade activities as tolerated     [x]  Continue plan of care, add push/pull sled for functional strength LE's  []  Update interventions per flow sheet       []  Discharge due to:_  []  Other:_      Phyllis Lemon, BEBETO 4/13/2017  4:49 PM    Future Appointments  Date Time Provider Roland Hoff   4/18/2017 10:00 AM Elise Pinzon, [facility]   4/21/2017 8:00 AM Joana story, [facility] [place] [facility] [place] [facility]   4/25/2017 11:00 [facility]   4/27/2017 8:00 AM BEBETO Garcia [facility]

## 2017-04-18 ENCOUNTER — HOSPITAL ENCOUNTER (OUTPATIENT)
Dept: PHYSICAL THERAPY | Age: 52
Discharge: HOME | End: 2017-04-18
Payer: COMMERCIAL

## 2017-04-18 PROCEDURE — 97110 THERAPEUTIC EXERCISES: CPT

## 2017-04-18 PROCEDURE — 97112 NEUROMUSCULAR REEDUCATION: CPT

## 2017-04-18 NOTE — PROGRESS NOTES
PT DAILY TREATMENT NOTE     Patient Name: Michelle Granadosn  Date:2017  : 1965  [x]  Patient  Verified  Payor: Pj Favorite / Plan: Layton Man / Product Type: HMO /    In time:10:03  Out time:10:50  Total Treatment Time (min): 52  Visit #: 10 of 18    Treatment Area: Pain and swelling of left knee [M25.562, M25.462]    SUBJECTIVE  Pain Level (0-10 scale): 6-7/10  Any medication changes, allergies to medications, adverse drug reactions, diagnosis change, or new procedure performed?: [x] No    [] Yes (see summary sheet for update)  Subjective functional status/changes:   [] No changes reported  \"I am having spasms in both of my thighs. \"     OBJECTIVE    Modality rationale:    Min Type Additional Details    [] Estim:  []Unatt       []IFC  []Premod                        []Other:  []w/ice   []w/heat  Position:  Location:    [] Estim: []Att    []TENS instruct  []NMES                    []Other:  []w/US   []w/ice   []w/heat  Position:  Location:    []  Traction: [] Cervical       []Lumbar                       [] Prone          []Supine                       []Intermittent   []Continuous Lbs:  [] before manual  [] after manual    []  Ultrasound: []Continuous   [] Pulsed                           []1MHz   []3MHz W/cm2:  Location:    []  Iontophoresis with dexamethasone         Location: [] Take home patch   [] In clinic    []  Ice     []  heat  []  Ice massage  []  Laser   []  Anodyne Position:  Location:    []  Laser with stim  []  Other:  Position:  Location:    []  Vasopneumatic Device Pressure:       [] lo [] med [] hi   Temperature: [] lo [] med [] hi   [] Skin assessment post-treatment:  []intact []redness- no adverse reaction    []redness - adverse reaction:      min []Eval                  []Re-Eval       39 min Therapeutic Exercise:  [x] See flow sheet : added 4 way hip, added sled push/pull   Rationale: increase ROM and increase strength to improve the patients ability to normalize function      min Therapeutic Activity:  []  See flow sheet :        8 min Neuromuscular Re-education:  [x]  See flow sheet : Application of kinesiotape to the left knee including 2 U-strips inferiorly and 2 strips in an X pattern medially with 50% tension. Educated patient on wear time and ability to bathe with tape on. Rationale: For pain control and added left knee stability      min Manual Therapy:          min Gait Training:  ___ feet with ___ device on level surfaces with ___ level of assist             With   [] TE   [] TA   [] neuro   [] other: Patient Education: [x] Review HEP    [] Progressed/Changed HEP based on:   [] positioning   [] body mechanics   [] transfers   [] heat/ice application    [] other:      Other Objective/Functional Measures:   No objective measures taken today      Pain Level (0-10 scale) post treatment: 2/10     ASSESSMENT/Changes in Function:   Despite reports of increased pain and spasms at the beginning of the session, pt was able to complete activities with good tolerance. Pt reported decreased pain at the end of session after application of kinesiotape. Patient will continue to benefit from skilled PT services to modify and progress therapeutic interventions, address functional mobility deficits, address ROM deficits, address strength deficits, analyze and address soft tissue restrictions, analyze and cue movement patterns, analyze and modify body mechanics/ergonomics, assess and modify postural abnormalities and instruct in home and community integration to attain remaining goals.      [x] See Plan of Care  [x] See progress note/recertification  [] See Discharge Summary      Progress towards goals / Updated goals:  Short Term Goals: To be accomplished in 3 weeks:  1.  Patient has increased awareness of faulty gait pattern and is able to use current mobility to perform swing phase with knee Fl ex and heel strike with max knee Ext  Status at Eval: patient maintains 20 deg knee Flex during swing /stance phase limiting any functional mobility  Current: progressing (pt freely moves out of flexed position)      2. Improved left knee ROM to >or= 15-90 deg for improved functional mobility with ADL  Status at Eval: AROM 20-80 deg left knee  Current: met for knee extension, regressed for knee flexion (PROM left knee: 0-75 degrees) 4/11/17      Long Term Goals: To be accomplished in 6 weeks:  1. Improved FOTO score to >or= 47/100 points as evidence of improved function and ambulation  Status at Eval: FOTO 18/100  Current: FOTO: 44/100      2. Improved strength LE to >or= 4/5 MMT for return to functional activities including housekeeping, stair climbing and limited squatting  Status at Eval:Left knee Ext 3-/5, Flex 4-/5  Current: progressing (Strength hip flexors: 4/5 (improved 1 grade on left, no change on right), Hip extensors (sitting): 5/5 bilaterally, Hip adductors: 5/5 bilaterally, Hip abductors: 5/5 bilaterally, Knee extension: 4+/5 (improved 1 2/3 grade on left), Knee flexors: 3/5, Ankle DF: 5/5) 4/11/17      3. Patient reports reduction in pain with ADL and ambulation to <or= 5/10 for gradual return to PLOF  Status at Eval: constant pain ranging from 7-10/10  Current: progressing (pain 0-4/10 at worst)      4.  Improved left knee flexion to >pe=391 deg for increased ease with negotiation of curbs/stairs  Status at Eval: limited knee Flex, always leading with RLE to ascend stairs   Current: regressed (PROM left knee: 0-75 degrees) 4/11/17       PLAN  [x]  Upgrade activities as tolerated     [x]  Continue plan of care  []  Update interventions per flow sheet       []  Discharge due to:_  [x]  Other:_  Monitor response to [facility] 4/18/2017  12:12 PM    Future Appointments  Date Time Provider Roland Hoff   4/21/2017 8:00 AM BEBETO Samaniego [facility]   4/25/2017 11:00 AM BEBETO Samaniego THE [facility]   4/27/2017 8:00 AM Joana Mora, PT [facility]

## 2017-04-21 ENCOUNTER — HOSPITAL ENCOUNTER (OUTPATIENT)
Dept: PHYSICAL THERAPY | Age: 52
Discharge: HOME | End: 2017-04-21
Payer: COMMERCIAL

## 2017-04-21 PROCEDURE — 97110 THERAPEUTIC EXERCISES: CPT

## 2017-04-21 PROCEDURE — 97140 MANUAL THERAPY 1/> REGIONS: CPT

## 2017-04-21 NOTE — PROGRESS NOTES
PT DAILY TREATMENT NOTE     Patient Name: Carol Walton  Date:2017  : 1965  [x]  Patient  Verified  Payor: Kianna Howe / Plan: Stuart Lanier / Product Type: HMO /    In time:805  Out time:10:50  Total Treatment Time (min): 45  Visit #: 11 of 18    Treatment Area: Pain and swelling of left knee [M25.562, M25.462]    SUBJECTIVE  Pain Level (0-10 scale): 7-8/10  Any medication changes, allergies to medications, adverse drug reactions, diagnosis change, or new procedure performed?: [x] No    [] Yes (see summary sheet for update)  Subjective functional status/changes:   [x] No changes reported  'Reports marked increase in left knee over the last 2-3 days with pain up to 10/10. Unknown cause. No change in activity level. Occasional feeling like knee is giving out.'  MD visit on 17 for draining of fluid.       OBJECTIVE    Modality rationale:    Min Type Additional Details    [] Estim:  []Unatt       []IFC  []Premod                        []Other:  []w/ice   []w/heat  Position:  Location:    [] Estim: []Att    []TENS instruct  []NMES                    []Other:  []w/US   []w/ice   []w/heat  Position:  Location:    []  Traction: [] Cervical       []Lumbar                       [] Prone          []Supine                       []Intermittent   []Continuous Lbs:  [] before manual  [] after manual    []  Ultrasound: []Continuous   [] Pulsed                           []1MHz   []3MHz W/cm2:  Location:    []  Iontophoresis with dexamethasone         Location: [] Take home patch   [] In clinic   5 [x]  Ice     []  heat  []  Ice massage  []  Laser   []  Anodyne Position:supine with LE's elevated  Location:left knee    []  Laser with stim  []  Other:  Position:  Location:    []  Vasopneumatic Device Pressure:       [] lo [] med [] hi   Temperature: [] lo [] med [] hi   [] Skin assessment post-treatment:  []intact []redness- no adverse reaction    []redness - adverse reaction:      min []Eval []Re-Eval       30 min Therapeutic Exercise:  [x] See flow sheet : added 4 way hip, added sled push/pull   Rationale: increase ROM and increase strength to improve the patients ability to normalize function      min Therapeutic Activity:  []  See flow sheet :         min Neuromuscular Re-education:  [x]  See flow sheet :    Rationale: For pain control and added left knee stability     10 min Manual Therapy: gentle functional massage and patella mobs left knee         min Gait Training:  ___ feet with ___ device on level surfaces with ___ level of assist             With   [] TE   [] TA   [] neuro   [] other: Patient Education: [x] Review HEP    [] Progressed/Changed HEP based on:   [] positioning   [] body mechanics   [] transfers   [] heat/ice application    [] other:      Other Objective/Functional Measures:   Prone knee Flex 70 deg, Ext -10  Altered ex routine due to increase in pain     Pain Level (0-10 scale) post treatment: 2/10     ASSESSMENT/Changes in Function:   Despite reports of increased pain  at the beginning of the session, pt was able to complete activities with good tolerance. Patient will continue to benefit from skilled PT services to modify and progress therapeutic interventions, address functional mobility deficits, address ROM deficits, address strength deficits, analyze and address soft tissue restrictions, analyze and cue movement patterns, analyze and modify body mechanics/ergonomics, assess and modify postural abnormalities and instruct in home and community integration to attain remaining goals.      [x] See Plan of Care  [x] See progress note/recertification  [] See Discharge Summary      Progress towards goals / Updated goals:  Short Term Goals: To be accomplished in 3 weeks:  1.  Patient has increased awareness of faulty gait pattern and is able to use current mobility to perform swing phase with knee Fl ex and heel strike with max knee Ext  Status at Eval: patient maintains 20 deg knee Flex during swing /stance phase limiting any functional mobility  Current: progressing (pt freely moves out of flexed position), improved gait pattern, goal met      2. Improved left knee ROM to >or= 15-90 deg for improved functional mobility with ADL  Status at Eval: AROM 20-80 deg left knee  Current: met for knee extension, regressed for knee flexion (PROM left knee: 0-75 degrees) 4/11/17      Long Term Goals: To be accomplished in 6 weeks:  1. Improved FOTO score to >or= 47/100 points as evidence of improved function and ambulation  Status at Eval: FOTO 18/100  Current: FOTO: 44/100      2. Improved strength LE to >or= 4/5 MMT for return to functional activities including housekeeping, stair climbing and limited squatting  Status at Eval:Left knee Ext 3-/5, Flex 4-/5  Current: progressing (Strength hip flexors: 4/5 (improved 1 grade on left, no change on right), Hip extensors (sitting): 5/5 bilaterally, Hip adductors: 5/5 bilaterally, Hip abductors: 5/5 bilaterally, Knee extension: 4+/5 (improved 1 2/3 grade on left), Knee flexors: 3/5, Ankle DF: 5/5) 4/11/17      3. Patient reports reduction in pain with ADL and ambulation to <or= 5/10 for gradual return to PLOF  Status at Eval: constant pain ranging from 7-10/10  Current: progressing (pain 0-4/10 at worst)      4.  Improved left knee flexion to >qb=675 deg for increased ease with negotiation of curbs/stairs  Status at Eval: limited knee Flex, always leading with RLE to ascend stairs   Current: regressed (PROM left knee: 0-75 degrees) 4/11/17       PLAN  [x]  Upgrade activities as tolerated     [x]  Continue plan of care  []  Update interventions per flow sheet       []  Discharge due to:_  [x]  Other:_  Monitor response to [id number] PT 4/21/2017  12:12 PM    Future Appointments  Date Time Provider Roland Hoff   4/25/2017 11:00 AM Mendoza Alcala, [facility]   4/27/2017 8:00 AM Joana story, PT [facility]

## 2017-04-25 ENCOUNTER — APPOINTMENT (OUTPATIENT)
Dept: PHYSICAL THERAPY | Age: 52
End: 2017-04-25
Payer: COMMERCIAL

## 2017-04-27 ENCOUNTER — APPOINTMENT (OUTPATIENT)
Dept: PHYSICAL THERAPY | Age: 52
End: 2017-04-27
Payer: COMMERCIAL

## 2017-05-02 ENCOUNTER — HOSPITAL ENCOUNTER (OUTPATIENT)
Dept: PHYSICAL THERAPY | Age: 52
Discharge: HOME | End: 2017-05-02
Payer: COMMERCIAL

## 2017-05-02 PROCEDURE — 97110 THERAPEUTIC EXERCISES: CPT

## 2017-05-02 NOTE — PROGRESS NOTES
PT DAILY TREATMENT NOTE     Patient Name: Tashia Henderson  Date:2017  : 1965  [x]  Patient  Verified  Payor: Femi Friedman / Plan: Lacey Pineda / Product Type: HMO /    In time:11:07  Out time:12:10  Total Treatment Time (min): 61  Visit #: 12 of 18    Treatment Area: Pain and swelling of left knee [M25.562, M25.462]    SUBJECTIVE  Pain Level (0-10 scale): 10/10  Any medication changes, allergies to medications, adverse drug reactions, diagnosis change, or new procedure performed?: [x] No    [] Yes (see summary sheet for update)  Subjective functional status/changes:   [] No changes reported  \"My knee is killing me. I don't know why it happened. \"    OBJECTIVE    Modality rationale: decrease edema, decrease inflammation and decrease pain to improve the patients ability to tolerate positions and ADLs.    Min Type Additional Details    [] Estim:  []Unatt       []IFC  []Premod                        []Other:  []w/ice   []w/heat  Position:  Location:    [] Estim: []Att    []TENS instruct  []NMES                    []Other:  []w/US   []w/ice   []w/heat  Position:  Location:    []  Traction: [] Cervical       []Lumbar                       [] Prone          []Supine                       []Intermittent   []Continuous Lbs:  [] before manual  [] after manual    []  Ultrasound: []Continuous   [] Pulsed                           []1MHz   []3MHz W/cm2:  Location:    []  Iontophoresis with dexamethasone         Location: [] Take home patch   [] In clinic   10 [x]  Ice     []  heat  []  Ice massage  []  Laser   []  Anodyne Position: supine  Location: applied to left knee    []  Laser with stim  []  Other:  Position:  Location:    []  Vasopneumatic Device Pressure:       [] lo [] med [] hi   Temperature: [] lo [] med [] hi   [] Skin assessment post-treatment:  []intact []redness- no adverse reaction    []redness - adverse reaction:      min []Eval                  []Re-Eval       53 min Therapeutic Exercise: [x] See flow sheet :  Added 4 way hip SLR using red theraband, added heel slides, added standing HR/TR, added hamstring sets   Rationale: increase ROM, increase strength, improve coordination, improve balance and increase proprioception to improve the patients ability to normalize ADLs. min Therapeutic Activity:  []  See flow sheet :         min Neuromuscular Re-education:  []  See flow sheet :        min Manual Therapy:          min Gait Training:  ___ feet with ___ device on level surfaces with ___ level of assist   Rationale: With   [] TE   [] TA   [] neuro   [] other: Patient Education: [x] Review HEP    [] Progressed/Changed HEP based on:   [] positioning   [] body mechanics   [] transfers   [] heat/ice application    [] other:      Other Objective/Functional Measures:   AROM left knee: 8-90 degrees  PROM left knee: 7-88 degrees  Pt issued instructions for preparation of ice slush for knee     Pain Level (0-10 scale) post treatment: 7/10    ASSESSMENT/Changes in Function:    Pt's knee flexion ROM has improved slightly, but knee extension ROM has worsened slightly  Progress is poor. Pt reports that she had a MRI and is awaiting findings. .    Patient will continue to benefit from skilled PT services to modify and progress therapeutic interventions, address functional mobility deficits, address ROM deficits, address strength deficits, analyze and address soft tissue restrictions, analyze and cue movement patterns, analyze and modify body mechanics/ergonomics, assess and modify postural abnormalities and instruct in home and community integration to attain remaining goals. []  See Plan of Care  []  See progress note/recertification  []  See Discharge Summary         Progress towards goals / Updated goals:  Short Term Goals: To be accomplished in 3 weeks:  1.  Patient has increased awareness of faulty gait pattern and is able to use current mobility to perform swing phase with knee Fl ex and heel strike with max knee Ext  Status at Eval: patient maintains 20 deg knee Flex during swing /stance phase limiting any functional mobility  Current: met (pt freely moves out of flexed position), improved gait pattern, goal met      2. Improved left knee ROM to >or= 15-90 deg for improved functional mobility with ADL  Status at Eval: AROM 20-80 deg left knee  Current: met (AROM left knee: 8-90 degrees) 5/02/17      Long Term Goals: To be accomplished in 6 weeks:  1. Improved FOTO score to >or= 47/100 points as evidence of improved function and ambulation  Status at Eval: FOTO 18/100  Current: progressing (FOTO: 44/100) 4/13/17      2. Improved strength LE to >or= 4/5 MMT for return to functional activities including housekeeping, stair climbing and limited squatting  Status at Eval:Left knee Ext 3-/5, Flex 4-/5  Current: progressing (Strength hip flexors: 4/5 (improved 1 grade on left, no change on right), Hip extensors (sitting): 5/5 bilaterally, Hip adductors: 5/5 bilaterally, Hip abductors: 5/5 bilaterally, Knee extension: 4+/5 (improved 1 2/3 grade on left), Knee flexors: 3/5, Ankle DF: 5/5) 4/11/17      3. Patient reports reduction in pain with ADL and ambulation to <or= 5/10 for gradual return to PLOF  Status at Eval: constant pain ranging from 7-10/10  Current: no change (pain 10/10 at worst) 5/02/17      4.  Improved left knee flexion to >fz=334 deg for increased ease with negotiation of curbs/stairs  Status at Eval: limited knee Flex, always leading with RLE to ascend stairs   Current: progressing (PROM left knee: 7-88 degrees) 5/02/17    PLAN  []  Upgrade activities as tolerated     []  Continue plan of care  []  Update interventions per flow sheet       []  Discharge due to:_  []  Other:_      Elijah Vail, PT 5/2/2017  11:38 AM    Future Appointments  Date Time Provider Roland Hoff   5/4/2017 1:30 PM [facility]   5/9/2017 9:00 AM [facility]   5/11/2017 8:30 AM [facility]yllis Lemon [place] [facility]   5/16/2017 10:30 AM [facility]   5/18/2017 9:30 AM BEBETO Anderson [facility]

## 2017-05-04 ENCOUNTER — APPOINTMENT (OUTPATIENT)
Dept: PHYSICAL THERAPY | Age: 52
End: 2017-05-04
Payer: COMMERCIAL

## 2017-05-09 ENCOUNTER — APPOINTMENT (OUTPATIENT)
Dept: PHYSICAL THERAPY | Age: 52
End: 2017-05-09
Payer: COMMERCIAL

## 2017-05-11 ENCOUNTER — APPOINTMENT (OUTPATIENT)
Dept: PHYSICAL THERAPY | Age: 52
End: 2017-05-11
Payer: COMMERCIAL

## 2017-05-16 ENCOUNTER — APPOINTMENT (OUTPATIENT)
Dept: PHYSICAL THERAPY | Age: 52
End: 2017-05-16
Payer: COMMERCIAL

## 2017-05-17 ENCOUNTER — HOSPITAL ENCOUNTER (OUTPATIENT)
Dept: PHYSICAL THERAPY | Age: 52
End: 2017-05-17
Payer: COMMERCIAL

## 2017-05-18 ENCOUNTER — APPOINTMENT (OUTPATIENT)
Dept: PHYSICAL THERAPY | Age: 52
End: 2017-05-18
Payer: COMMERCIAL

## 2017-05-19 ENCOUNTER — HOSPITAL ENCOUNTER (OUTPATIENT)
Dept: PHYSICAL THERAPY | Age: 52
End: 2017-05-19
Payer: COMMERCIAL

## 2017-05-19 NOTE — PROGRESS NOTES
[facility] in [address] [place], [address] [place]  Phone: [phone]      Fax:  [phone]    Discharge Summary      Patient name: Chadwick Triana           Start of Care: 3/13/17  Referral source: Lane Baxter MD          : 1965  Medical/Treatment Diagnosis: Pain and swelling of left knee [M25.562, M25.462], S/P TKR revision  Onset Date: TKA , revision   Prior Hospitalization: see medical history          Provider#: [id number]  Comorbidities: arthritis, latex allergy, high BP  Prior Level of Function: Normal function prior to fall and TKA in , since then limited function and mobility  Medications: Verified on Patient Summary List  Visits from Start of Care: 12           Missed Visits: 2  Reporting Period : 17 to 17    Short Term Goals: To be accomplished in 3 weeks:  1. Patient has increased awareness of faulty gait pattern and is able to use current mobility to perform swing phase with knee Fl ex and heel strike with max knee Ext  Status at [facility]: patient maintains 20 deg knee Flex during swing /stance phase limiting any functional mobility  Current: met (pt freely moves out of flexed position), improved gait pattern      2. Improved left knee ROM to >or= 15-90 deg for improved functional mobility with ADL  Status at [facility]: AROM 20-80 deg left knee  Current: met (AROM left knee: 8-90 degrees)      Long Term Goals: To be accomplished in 6 weeks:  1. Improved FOTO score to >or= 47/100 points as evidence of improved function and ambulation  Status at Eval: FOTO 18/100  Current: progressing (FOTO: 44/100)      2.  Improved strength LE to >or= 4/5 MMT for return to functional activities including housekeeping, stair climbing and limited squatting  Status at Eval:Left knee Ext 3-/5, Flex 4-/5  Current: progressing (Strength hip flexors: 4/5 (improved 1 grade on left, no change on right), Hip extensors (sitting): 5/5 bilaterally, Hip adductors: 5/5 bilaterally, Hip abductors: 5/5 bilaterally, Knee extension: 4+/5 (improved 1 2/3 grade on left), Knee flexors: 3/5, Ankle DF: 5/5)      3. Patient reports reduction in pain with ADL and ambulation to <or= 5/10 for gradual return to PLOF  Status at Eval: constant pain ranging from 7-10/10  Current: no change (pain 10/10 at worst)      4. Improved left knee flexion to >uc=809 deg for increased ease with negotiation of curbs/stairs  Status at Eval: limited knee Flex, always leading with RLE to ascend stairs   Current: progressing (PROM left knee: 7-88 degrees)    Assessment/ Summary of Care:    Pt attended only 4 PT treatments since last progress note was issued on 4/11/17. Pt's last attended PT treatment was on 5/02/17 and she did not return for unknown resaons. Pt continued to report significant knee pain with significant limitation of AROM and PROM of the knee. Pt has been discharged due to lack of further attendance.     RECOMMENDATIONS:  [x]Discontinue therapy: []Patient has reached or is progressing toward set goals      [x]Patient is non-compliant or has abdicated      []Due to lack of appreciable progress towards set goals    Les Garcia, PT 5/19/2017 11:33 AM

## 2017-05-24 ENCOUNTER — HOSPITAL ENCOUNTER (OUTPATIENT)
Dept: PHYSICAL THERAPY | Age: 52
Discharge: HOME | End: 2017-05-24
Payer: COMMERCIAL

## 2017-05-24 PROCEDURE — 97162 PT EVAL MOD COMPLEX 30 MIN: CPT

## 2017-05-24 PROCEDURE — 97164 PT RE-EVAL EST PLAN CARE: CPT

## 2017-05-24 NOTE — PROGRESS NOTES
[facility] [place] [facility] [address] [place] [address]  Phone: [phone]   Fax: [phone]    Initial Plan of Care/Statement of Necessity for Physical Therapy Services     Patient name: Lindsay Bourgeois Start of Care: 2017   Referral source: Albertina Hargrove MD : 1965    Medical Diagnosis: Left knee pain [M25.562]   Onset Date:TKA , revision      Treatment Diagnosis: left knee pain   Prior Hospitalization: see medical history Provider#: [id number]   Medications: Verified on Patient summary List    Comorbidities: arthritis, latex allergy, high BP   Prior Level of Function:Normal function prior to fall and TKA in , since then limited function and mobility     The Plan of Care and following information is based on the information from the initial evaluation. Assessment/ key information: Pt presenting to clinic for aquatic PT for chronic left knee pain since  s/p TKA/revision/manipulation . She was a recent pt at [facility] of this year reporting her pain worsened with land-based PT. On exam, pt ambulates with abnormal and antalgic gait pattern. She has decreased left knee AROM: 10-70, decreased left LE strength for muscle groups tested, and significant HS tightness left LE. Because pt did not tolerate land-based PT well, she would be a good candidate to trial aquatic PT to address aforementioned deficits. MRI: \"nothing wrong\"  Present Functional Limitations: standing, walking, stair negotiation     Evaluation Complexity History MEDIUM  Complexity : 1-2 comorbidities / personal factors will impact the outcome/ POC ; Examination MEDIUM Complexity : 3 Standardized tests and measures addressing body structure, function, activity limitation and / or participation in recreation  ;Presentation MEDIUM Complexity : Evolving with changing characteristics  ; Clinical Decision Making MEDIUM Complexity : FOTO score of 26-74  Overall Complexity Rating: MEDIUM  Problem List: pain affecting function, decrease ROM, decrease strength, impaired gait/ balance, decrease ADL/ functional abilitiies, decrease activity tolerance and decrease flexibility/ joint mobility   Treatment Plan may include any combination of the following: Aquatic therapy and Patient education  Patient / Family readiness to learn indicated by: asking questions, trying to perform skills and interest  Persons(s) to be included in education: patient (P)  Barriers to Learning/Limitations: None  Patient Goal (s): knee to bend, decrease pain  Patient Self Reported Health Status: fair  Rehabilitation Potential: fair    Short Term Goals: To be accomplished in 2 weeks:  1. Pt will tolerate a full aquatics session without increased pain or difficulty to achieve other goals. Status at eval: N/A    Long Term Goals: To be accomplished in 4 weeks:  1. Pt will be independent with aquatic exercises to transition to pool on her own after discharge. Status at eval: not independent  2. Improve FOTO score to >/= 49/100 to indicate decreased pain with ADL's. Status at eval: 36/100  2. Increase left hip flex strength >/= 4+/5 to normalize gait and function. Status at eval: 4-/5  3. Increase left knee flex AROM >/= 90 to normalize ADL's. Status at eval: 70 degrees    Frequency / Duration: Patient to be seen 2 times per week for 4 weeks. Patient/ Caregiver education and instruction: Diagnosis, prognosis, discussed purpose and benefits of aquatic PTother discussed purpose and benefits of aquatic PT   [x]  Plan of care has been reviewed with RA Smith, PT 5/24/2017 2:34 PM  _____________________________________________________________________  I certify that the above Therapy Services are being furnished while the patient is under my care. I agree with the treatment plan and certify that this therapy is necessary.     Physician's Signature:____________________  Date:__________Time:______    Please sign and return to [facility] at [address] [place] [address]  Phone: [phone]   Fax: [phone]

## 2017-05-24 NOTE — PROGRESS NOTES
PT DAILY TREATMENT NOTE     Patient Name: Neville Bradley  Date:2017  : 1965  [x]  Patient  Verified  Payor: Cintia Garcia / Plan: Arleen Boligee / Product Type: HMO /    In time:200  Out time:220  Total Treatment Time (min): 20  Visit #: 1 of 8    Treatment Area: Left knee pain [M25.562]    SUBJECTIVE  Pain Level (0-10 scale): 8/10 Recent max: 10/10  Any medication changes, allergies to medications, adverse drug reactions, diagnosis change, or new procedure performed?: [x] No    [] Yes (see summary sheet for update)  Subjective functional status/changes:   [] No changes reported  See POC    OBJECTIVE    Modality rationale:    Min Type Additional Details    [] Estim:  []Unatt       []IFC  []Premod                        []Other:  []w/ice   []w/heat  Position:  Location:    [] Estim: []Att    []TENS instruct  []NMES                    []Other:  []w/US   []w/ice   []w/heat  Position:  Location:    []  Traction: [] Cervical       []Lumbar                       [] Prone          []Supine                       []Intermittent   []Continuous Lbs:  [] before manual  [] after manual    []  Ultrasound: []Continuous   [] Pulsed                           []1MHz   []3MHz W/cm2:  Location:    []  Iontophoresis with dexamethasone         Location: [] Take home patch   [] In clinic    []  Ice     []  heat  []  Ice massage  []  Laser   []  Anodyne Position:  Location:    []  Laser with stim  []  Other:  Position:  Location:    []  Vasopneumatic Device Pressure:       [] lo [] med [] hi   Temperature: [] lo [] med [] hi   [] Skin assessment post-treatment:  []intact []redness- no adverse reaction    []redness - adverse reaction:     20 min [x]Eval                  []Re-Eval        min Therapeutic Exercise:  [] See flow sheet :        min Therapeutic Activity:  []  See flow sheet :         min Neuromuscular Re-education:  []  See flow sheet :        min Manual Therapy:          min Gait Training:  ___ feet with ___ device on level surfaces with ___ level of assist   Rationale: With   [] TE   [] TA   [] neuro   [] other: Patient Education: [x] Review HEP    [] Progressed/Changed HEP based on:   [] positioning   [] body mechanics   [] transfers   [] heat/ice application    [] other:      Other Objective/Functional Measures:   Physical Therapy Evaluation - Knee  Pt presenting to clinic for aquatic PT for chronic left knee pain since 2014 s/p TKA/revision/manipulation . She was a recent pt at [facility] of this year reporting her pain worsened.   MRI: \"nothing wrong\"  PLOF:Normal function prior to fall and TKA in 2014, since then limited function and mobility  Present Functional Limitations: standing, walking, stair negotiation     Gait:  [] Normal    [x] Abnormal    [x] Antalgic    [] NWB    Device: none (pt typically ambulates with SPC in home and community)    Describe:    ROM / Strength  [] Unable to assess                  AROM                      PROM                   Strength (1-5)    Left Right Left Right Left Right   Hip Flexion     4-/5 4+/5    Extension          Abduction          Adduction         Knee Flexion 70 115   4+/5 5/5    Extension 10 0   4+/5 4+/5   Ankle Plantarflexion          Dorsiflexion             Flexibility: [] Unable to assess at this time  Hamstrings:    (L) Tightness= [] WNL   [] Min   [] Mod   [x] Severe  35 degrees    (R) Tightness= [] WNL   [x] Min   [] Mod   [] Severe  7 degrees  Quadriceps:    (L) Tightness= [] WNL   [] Min   [] Mod   [] Severe    (R) Tightness= [] WNL   [] Min   [] Mod   [] Severe  Gastroc:      (L) Tightness= [] WNL   [] Min   [] Mod   [] Severe    (R) Tightness= [] WNL   [] Min   [] Mod   [] Severe  Other:    Palpation:   Neg/Pos  Neg/Pos  Neg/Pos   Joint Line  Quad tendon  Patellar ligament    Patella  Fibular head  Pes Anserinus    Tibial tubercle  Hamstring tendons  Infrapatellar fat pad      Optional Tests:  Patellar Positioning (Static)   []L []R Normal []L []R Lateral   []L []R Pama Glory      []L []R Medial   []L []R [facility]OURN AT [place]    Patellar Tracking   []L []R Glide (Lat)   []L []R Tilt (Lat)     []L []R Glide (Med)  []L []R Tilt (Med)      []L []R Tile (Inf)     Patellar Mobility   []L []R Hypermobile []L []R Hypomobile         Girth Measurements:     Cm at  Cm above joint line   Cm at   Cm below joint line  Cm at joint line   Left        Right           Lachmans  [] Neg    [] Pos Posterior Drawer [] Neg    [] Pos  Pivot Shift  [] Neg    [] Pos Posterior Sag  [] Neg    [] Pos  BRENDA   [] Neg    [] Pos Georgia's Test [] Neg    [] Pos  ALRI   [] Neg    [] Pos Squat   [] Neg    [] Pos  Valgus@ 0 Degrees [] Neg    [] Pos Dusty [] Neg    [] Pos  Valgus@ 30 Degrees [] Neg    [] Pos Patellar Apprehension [] Neg    [] Pos  Varus@ 0 Degrees [] Neg    [] Pos Hensley's Compression [] Neg    [] Pos  Varus@ 30 Degrees [] Neg    [] Pos Ely's Test  [] Neg    [] Pos  Apley's Compression [] Neg    [] Pos Debbie's Test  [] Neg    [] Pos  Apley's Distraction [] Neg    [] Pos Stroke Test  [] Neg    [] Pos   Anterior Drawer [] Neg    [] Pos Fluctuation Test [] Neg    [] Pos  Other:                  [] Neg    [] Pos                 Other tests/comments:         Pain Level (0-10 scale) post treatment: 8/10    ASSESSMENT/Changes in Function: see POC    Patient will continue to benefit from skilled PT services to modify and progress therapeutic interventions, address functional mobility deficits, address ROM deficits, address strength deficits, analyze and address soft tissue restrictions, analyze and cue movement patterns and assess and modify postural abnormalities to attain remaining goals.      []  See Plan of Care  []  See progress note/recertification  []  See Discharge Summary         Progress towards goals / Updated goals:  See POC    PLAN  [x]  Upgrade activities as tolerated     [x]  Continue plan of care  []  Update interventions per flow sheet       []  Discharge due to:_  [x]  Other: aquatic PT    Shivani Garay, BEBETO 5/24/2017  2:05 PM    Future Appointments  Date Time Provider Roland Hoff   5/31/2017 1:30 PM Hernandez Mims PTA [facility]

## 2017-05-31 ENCOUNTER — HOSPITAL ENCOUNTER (OUTPATIENT)
Dept: PHYSICAL THERAPY | Age: 52
Discharge: HOME | End: 2017-05-31
Payer: COMMERCIAL

## 2017-05-31 PROCEDURE — 97113 AQUATIC THERAPY/EXERCISES: CPT

## 2017-05-31 NOTE — PROGRESS NOTES
PT DAILY TREATMENT NOTE     Patient Name: Jitendra Gibbs  Date:2017  : 1965  [x]  Patient  Verified  Payor: Katie Allen / Plan: Shyam Bazzi / Product Type: HMO /    In time:1327  Out time:1400  Total Treatment Time (min): 33  Visit #: 2 of 8    Treatment Area: Pain and swelling of left knee [M25.562, M25.462]    SUBJECTIVE  Pain Level (0-10 scale): 2/10  Any medication changes, allergies to medications, adverse drug reactions, diagnosis change, or new procedure performed?: [x] No    [] Yes (see summary sheet for update)  Subjective functional status/changes:   [] No changes reported  I did not do well on land at all.     OBJECTIVE    Modality rationale:    Min Type Additional Details    [] Estim:  []Unatt       []IFC  []Premod                        []Other:  []w/ice   []w/heat  Position:  Location:    [] Estim: []Att    []TENS instruct  []NMES                    []Other:  []w/US   []w/ice   []w/heat  Position:  Location:    []  Traction: [] Cervical       []Lumbar                       [] Prone          []Supine                       []Intermittent   []Continuous Lbs:  [] before manual  [] after manual    []  Ultrasound: []Continuous   [] Pulsed                           []1MHz   []3MHz W/cm2:  Location:    []  Iontophoresis with dexamethasone         Location: [] Take home patch   [] In clinic    []  Ice     []  heat  []  Ice massage  []  Laser   []  Anodyne Position:  Location:    []  Laser with stim  []  Other:  Position:  Location:    []  Vasopneumatic Device Pressure:       [] lo [] med [] hi   Temperature: [] lo [] med [] hi   [] Skin assessment post-treatment:  []intact []redness- no adverse reaction    []redness - adverse reaction:      min []Eval                  []Re-Eval       33 min Therapeutic Exercise:  [x] See flow sheet :   Rationale: increase ROM, increase strength, improve coordination, improve balance and increase proprioception to improve the patients ability to improve gait and ADL's     min Therapeutic Activity:  []  See flow sheet :         min Neuromuscular Re-education:  []  See flow sheet :        min Manual Therapy:          min Gait Training:  ___ feet with ___ device on level surfaces with ___ level of assist   Rationale: With   [] TE   [] TA   [] neuro   [] other: Patient Education: [x] Review HEP    [] Progressed/Changed HEP based on:   [] positioning   [] body mechanics   [] transfers   [] heat/ice application    [] other:      Other Objective/Functional Measures: see POC     Pain Level (0-10 scale) post treatment: 0/10    ASSESSMENT/Changes in Function: Pt demonstrated good tolerance for first aquatic session today. Patient will continue to benefit from skilled PT services to modify and progress therapeutic interventions, address functional mobility deficits, address ROM deficits, address strength deficits, analyze and address soft tissue restrictions, analyze and cue movement patterns, analyze and modify body mechanics/ergonomics and instruct in home and community integration to attain remaining goals. [x]  See Plan of Care  []  See progress note/recertification  []  See Discharge Summary         Progress towards goals / Updated goals:  Short Term Goals: To be accomplished in 2 weeks:  1. Pt will tolerate a full aquatics session without increased pain or difficulty to achieve other goals. Status at [facility]: N/A  Current: good chele;erance for first aquatic session today     Long Term Goals: To be accomplished in 4 weeks:  1. Pt will be independent with aquatic exercises to transition to pool on her own after discharge. Status at [facility]: not independent  CurrentL issued aquatic HEP today  2. Improve FOTO score to >/= 49/100 to indicate decreased pain with ADL's. Status at [facility]: 36/100  Current: retest every 5 visits  2. Increase left hip flex strength >/= 4+/5 to normalize gait and function. Status at eval: 4-/5  Current: no change  3.  Increase left knee flex AROM >/= 90 to normalize ADL's. Status at eval: 70 degrees  Current: progressing in the water   PLAN  []  Upgrade activities as tolerated     [x]  Continue plan of care  []  Update interventions per flow sheet       []  Discharge due to:_  [x]  Other:_Assess response to fist aquatic session next visit.        Welfwillis Carbine, PTA 5/31/2017  3:58 PM    Future Appointments  Date Time Provider Roland Hoff   6/7/2017 1:30 PM Welford Carbine, PTA [facility]   6/9/2017 10:30 AM Welford Carbine, PTA [facility]   6/14/2017 11:00 AM Welford Carbine, PTA [facility]   6/16/2017 10:30 AM Welford Carbine, PTA [facility]   6/21/2017 1:00 PM Welford Carbine, PTA [facility]   6/23/2017 12:30 PM Welford Carbine, PTA [facility]   6/28/2017 1:00 PM Welford Carbine, PTA [facility]   6/30/2017 12:30 PM Welford Carbine, PTA [facility]

## 2017-06-07 ENCOUNTER — HOSPITAL ENCOUNTER (OUTPATIENT)
Dept: PHYSICAL THERAPY | Age: 52
Discharge: HOME | End: 2017-06-07
Payer: COMMERCIAL

## 2017-06-07 PROCEDURE — 97113 AQUATIC THERAPY/EXERCISES: CPT

## 2017-06-09 ENCOUNTER — HOSPITAL ENCOUNTER (OUTPATIENT)
Dept: PHYSICAL THERAPY | Age: 52
Discharge: HOME | End: 2017-06-09
Payer: COMMERCIAL

## 2017-06-09 PROCEDURE — 97113 AQUATIC THERAPY/EXERCISES: CPT

## 2017-06-14 ENCOUNTER — HOSPITAL ENCOUNTER (OUTPATIENT)
Dept: PHYSICAL THERAPY | Age: 52
Discharge: HOME | End: 2017-06-14
Payer: COMMERCIAL

## 2017-06-14 PROCEDURE — 97113 AQUATIC THERAPY/EXERCISES: CPT

## 2017-06-16 ENCOUNTER — HOSPITAL ENCOUNTER (OUTPATIENT)
Dept: PHYSICAL THERAPY | Age: 52
Discharge: HOME | End: 2017-06-16
Payer: COMMERCIAL

## 2017-06-16 PROCEDURE — 97113 AQUATIC THERAPY/EXERCISES: CPT

## 2017-06-21 ENCOUNTER — APPOINTMENT (OUTPATIENT)
Dept: PHYSICAL THERAPY | Age: 52
End: 2017-06-21
Payer: COMMERCIAL

## 2017-06-23 ENCOUNTER — HOSPITAL ENCOUNTER (OUTPATIENT)
Dept: PHYSICAL THERAPY | Age: 52
Discharge: HOME | End: 2017-06-23
Payer: COMMERCIAL

## 2017-06-23 PROCEDURE — 97113 AQUATIC THERAPY/EXERCISES: CPT

## 2017-06-26 NOTE — PROGRESS NOTES
[facility]  [facility] [address] [place] [address] Street  Phone: [phone]   Fax: [phone]    Discharge Summary    Patient name: Vonda Chan     Start of Care: 17  Referral source: Michelle Song MD    : 1965  Medical/Treatment Diagnosis: Left knee pain [M25.562]  Onset Date:TKA , revision    Prior Hospitalization: see medical history   Provider#: [id number]  Comorbidities:arthritis, latex allergy, high BP   Prior Level of Function:Normal function prior to fall and TKA in , since then limited function and mobility      Medications: Verified on Patient Summary List    Visits from Start of Care: 7    Missed Visits: 0  Reporting Period : 17 to 17    Short Term Goals: To be accomplished in 2 weeks:  1. Pt will tolerate a full aquatics session without increased pain or difficulty to achieve other goals. Status at eval: N/A  Current: met: some pain relief reported in water, but no carryover to land      Long Term Goals: To be accomplished in 4 weeks:  1. Pt will be independent with aquatic exercises to transition to pool on her own after discharge. Status at eval: not independent  CurrentL pt not highly motivated to continue with aquatics  2. Improve FOTO score to >/= 49/100 to indicate decreased pain with ADL's. Status at eval: 36/100  Current: not met: 23/100  2. Increase left hip flex strength >/= 4+/5 to normalize gait and function. Status at eval: 4-/5  Current: no change  3. Increase left knee flex AROM >/= 90 to normalize ADL's. Status at eval: 70 degrees  Current: not met: 70 degrees      Assessment/ Summary of Care:  No significant improvements since [facility] with limited progress towards goals. High pain levels persist, 8-10/10. HEP reviewed for self-management of symptoms.     RECOMMENDATIONS:  [x]Discontinue therapy: []Patient has reached or is progressing toward set goals      []Patient is non-compliant or has abdicated      [x]Due to lack of appreciable progress towards set goals    Tonia Romano, PT 6/26/2017 12:22 PM

## 2017-06-28 ENCOUNTER — APPOINTMENT (OUTPATIENT)
Dept: PHYSICAL THERAPY | Age: 52
End: 2017-06-28
Payer: COMMERCIAL

## 2017-06-30 ENCOUNTER — APPOINTMENT (OUTPATIENT)
Dept: PHYSICAL THERAPY | Age: 52
End: 2017-06-30
Payer: COMMERCIAL

## 2017-07-26 ENCOUNTER — HOSPITAL ENCOUNTER (EMERGENCY)
Age: 52
Discharge: HOME | End: 2017-07-26
Attending: EMERGENCY MEDICINE
Payer: COMMERCIAL

## 2017-07-26 VITALS
HEIGHT: 64 IN | DIASTOLIC BLOOD PRESSURE: 90 MMHG | RESPIRATION RATE: 16 BRPM | BODY MASS INDEX: 40.46 KG/M2 | HEART RATE: 85 BPM | TEMPERATURE: 97 F | WEIGHT: 237 LBS | OXYGEN SATURATION: 99 % | SYSTOLIC BLOOD PRESSURE: 143 MMHG

## 2017-07-26 DIAGNOSIS — R21 RASH AND OTHER NONSPECIFIC SKIN ERUPTION: Primary | ICD-10-CM

## 2017-07-26 DIAGNOSIS — G89.29 OTHER CHRONIC PAIN: ICD-10-CM

## 2017-07-26 PROCEDURE — 99281 EMR DPT VST MAYX REQ PHY/QHP: CPT

## 2017-07-26 RX ORDER — PREDNISONE 10 MG/1
TABLET ORAL
Qty: 1 PACKAGE | Refills: 0 | Status: SHIPPED | OUTPATIENT
Start: 2017-07-26 | End: 2018-12-04

## 2017-07-26 RX ORDER — DIPHENHYDRAMINE HCL 25 MG
50 CAPSULE ORAL
Qty: 30 CAP | Refills: 0 | Status: SHIPPED | OUTPATIENT
Start: 2017-07-26 | End: 2017-07-30

## 2017-07-26 NOTE — DISCHARGE INSTRUCTIONS
Chronic Pain: Care Instructions  Your Care Instructions  Chronic pain is pain that lasts a long time (months or even years) and may or may not have a clear cause. It is different from acute pain, which usually does have a clear cause--like an injury or illness--and gets better over time. Chronic pain:  · Lasts over time but may vary from day to day. · Does not go away despite efforts to end it. · May disrupt your sleep and lead to fatigue. · May cause depression or anxiety. · May make your muscles tense, causing more pain. · Can disrupt your work, hobbies, home life, and relationships with friends and family. Chronic pain is a very real condition. It is not just in your head. Treatment can help and usually includes several methods used together, such as medicines, physical therapy, exercise, and other treatments. Learning how to relax and changing negative thought patterns can also help you cope. Chronic pain is complex. Taking an active role in your treatment will help you better manage your pain. Tell your doctor if you have trouble dealing with your pain. You may have to try several things before you find what works best for you. Follow-up care is a key part of your treatment and safety. Be sure to make and go to all appointments, and call your doctor if you are having problems. Its also a good idea to know your test results and keep a list of the medicines you take. How can you care for yourself at home? · Pace yourself. Break up large jobs into smaller tasks. Save harder tasks for days when you have less pain, or go back and forth between hard tasks and easier ones. Take rest breaks. · Relax, and reduce stress. Relaxation techniques such as deep breathing or meditation can help. · Keep moving. Gentle, daily exercise can help reduce pain over the long run. Try low- or no-impact exercises such as walking, swimming, and stationary biking. Do stretches to stay flexible.   · Try heat, cold packs, and massage. · Get enough sleep. Chronic pain can make you tired and drain your energy. Talk with your doctor if you have trouble sleeping because of pain. · Think positive. Your thoughts can affect your pain level. Do things that you enjoy to distract yourself when you have pain instead of focusing on the pain. See a movie, read a book, listen to music, or spend time with a friend. · If you think you are depressed, talk to your doctor about treatment. · Keep a daily pain diary. Record how your moods, thoughts, sleep patterns, activities, and medicine affect your pain. You may find that your pain is worse during or after certain activities or when you are feeling a certain emotion. Having a record of your pain can help you and your doctor find the best ways to treat your pain. · Take pain medicines exactly as directed. ¨ If the doctor gave you a prescription medicine for pain, take it as prescribed. ¨ If you are not taking a prescription pain medicine, ask your doctor if you can take an over-the-counter medicine. Reducing constipation caused by pain medicine  · Include fruits, vegetables, beans, and whole grains in your diet each day. These foods are high in fiber. · Drink plenty of fluids, enough so that your urine is light yellow or clear like water. If you have kidney, heart, or liver disease and have to limit fluids, talk with your doctor before you increase the amount of fluids you drink. · If your doctor recommends it, get more exercise. Walking is a good choice. Bit by bit, increase the amount you walk every day. Try for at least 30 minutes on most days of the week. · Schedule time each day for a bowel movement. A daily routine may help. Take your time and do not strain when having a bowel movement. When should you call for help? Call your doctor now or seek immediate medical care if:  · Your pain gets worse or is out of control.   · You feel down or blue, or you do not enjoy things like you once did. You may be depressed, which is common in people with chronic pain. Depression can be treated. · You have vomiting or cramps for more than 2 hours. Watch closely for changes in your health, and be sure to contact your doctor if:  · You cannot sleep because of pain. · You are very worried or anxious about your pain. · You have trouble taking your pain medicine. · You have any concerns about your pain medicine. · You have trouble with bowel movements, such as:  ¨ No bowel movement in 3 days. ¨ Blood in the anal area, in your stool, or on the toilet paper. ¨ Diarrhea for more than 24 hours. Where can you learn more? Go to http://camden-kiran.info/. Enter N004 in the search box to learn more about \"Chronic Pain: Care Instructions. \"  Current as of: October 14, 2016  Content Version: 11.3  © 8575-6890 [employer]. Care instructions adapted under license by [employer] (which disclaims liability or warranty for this information). If you have questions about a medical condition or this instruction, always ask your healthcare professional. [place] [address] any warranty or liability for your use of this information. Rash: Care Instructions  Your Care Instructions  A rash is any irritation or inflammation of the skin. Rashes have many possible causes, including allergy, infection, illness, heat, and emotional stress. Follow-up care is a key part of your treatment and safety. Be sure to make and go to all appointments, and call your doctor if you are having problems. Its also a good idea to know your test results and keep a list of the medicines you take. How can you care for yourself at home? · Wash the area with water only. Soap can make dryness and itching worse. Pat dry. · Put cold, wet cloths on the rash to reduce itching. · Keep cool, and stay out of the sun.   · Leave the rash open to the air as much of the time as possible. · Sometimes petroleum jelly (Vaseline) can help relieve the discomfort caused by a rash. A moisturizing lotion, such as Cetaphil, also may help. Calamine lotion may help for rashes caused by contact with something (such as a plant or soap) that irritated the skin. Use it 3 or 4 times a day. · If your doctor prescribed a cream, use it as directed. If your doctor prescribed medicine, take it exactly as directed. · If your rash itches so badly that it interferes with your normal activities, take an over-the-counter antihistamine, such as diphenhydramine (Benadryl) or loratadine (Claritin). Read and follow all instructions on the label. When should you call for help? Call your doctor now or seek immediate medical care if:  · You have signs of infection, such as:  ¨ Increased pain, swelling, warmth, or redness. ¨ Red streaks leading from the area. ¨ Pus draining from the area. ¨ A fever. · You have joint pain along with the rash. Watch closely for changes in your health, and be sure to contact your doctor if:  · Your rash is changing or getting worse. For example, call if you have pain along with the rash, the rash is spreading, or you have new blisters. · You do not get better after 1 week. Where can you learn more? Go to http://camden-kiran.info/. Enter G860 in the search box to learn more about \"Rash: Care Instructions. \"  Current as of: October 13, 2016  Content Version: 11.3  © 5280-9601 [employer]. Care instructions adapted under license by [employer] (which disclaims liability or warranty for this information). If you have questions about a medical condition or this instruction, always ask your healthcare professional. [address] any warranty or liability for your use of this information.

## 2017-07-26 NOTE — ED NOTES
Fabiola Dean was discharged in good condition. The patient's diagnosis, condition and treatment were explained to patient and aftercare instructions were given. The patient verbalized good understanding. Patient armband removed and both labels and armband were placed in shred bin. Patient left ER via wheelchair in no acute distress. 2 prescriptions provided.

## 2017-07-26 NOTE — ED PROVIDER NOTES
[address]  EMERGENCY DEPARTMENT HISTORY AND PHYSICAL EXAM       Date: 7/26/2017   Patient Name: Ann Zavala   YOB: 1965  Medical Record Number: [id number]    HISTORY OF THE PRESENT ILLNESS    Chief Complaint   Patient presents with    Leg Pain    Rash        History Provided By:  patient    Additional History:   2:23 AM    Ann Zavala is a 54 y.o. female with pertinent PMHx of anxiety, HTN, CA, arthritis, chronic pain and hysterectomy presenting ambulatory to the ED c/o diffuse itching rash to her bilateral legs/back x 3 days. Pt notes an associated symptom of chronic spasm-like bilateral leg pain x months. Pt denies any recent use of new soaps, lotions, laundry detergents or foods. Pt notes OTC topical cream and Benadryl, with no relief. Pt notes that she has been taking ibuprofen and Zanaflex for her chronic spasmPt specifically denies any fever/chills, depression or SOB. PCP: Shawna Martinez MD  Social Hx: - tobacco use, - alcohol use, - illicit drug use    There are no other complaints, changes, or physical findings at this time.       PAST HISTORY    Past Medical History:   Past Medical History:   Diagnosis Date    Anxiety     Arthritis     Cancer ([facility] [address]     endometrial     Chronic pain     legs knees headaches    Coagulation defects     anemia    Depression     HX OTHER MEDICAL     muscle cramping    Hypertension     Other ill-defined conditions     muscle spasms in legs    Unspecified sleep apnea     in the past had gastric bypass        Past Surgical History:   Past Surgical History:   Procedure Laterality Date    HX CHOLECYSTECTOMY      HX GASTRIC BYPASS  2005    HX HYSTERECTOMY      HX KNEE REPLACEMENT      HX ORTHOPAEDIC      LCTR        Family History:   Family History   Problem Relation Age of Onset    Malignant Hyperthermia Neg Hx     Pseudocholinesterase Deficiency Neg Hx     Delayed Awakening Neg Hx     Post-op Nausea/Vomiting Neg Hx     Emergence Delirium Neg Hx     Post-op Cognitive Dysfunction Neg Hx     Other Neg Hx         Social History:   Social History   Substance Use Topics    Smoking status: Never Smoker    Smokeless tobacco: None    Alcohol use No        Allergies: Allergies   Allergen Reactions    Percocet [Oxycodone-Acetaminophen] Itching        Review of Systems   Review of Systems   Constitutional: Negative for chills and fever. Respiratory: Negative for shortness of breath. Musculoskeletal: Positive for myalgias (bilateral leg pain). Skin: Positive for rash. Psychiatric/Behavioral:        - depression   All other systems reviewed and are negative. PHYSICAL EXAM  Vitals:    07/26/17 0211   BP: 143/90   Pulse: 85   Resp: 16   Temp: 97 °F (36.1 °C)   SpO2: 99%   Weight: 107.5 kg (237 lb)   Height: 5' 4\" (1.626 m)       Physical Exam   Nursing note and vitals reviewed. Constitutional: Well appearing, no acute distress; obese, anxious  Head: Normocephalic, Atraumatic  Neck: Supple  Cardiovascular: Regular rate and rhythm, no murmurs, rubs, or gallops  Chest: Normal work of breathing and chest excursion bilaterally  Lungs: Clear to ausculation bilaterally  Back: No evidence of trauma or deformity  Extremities: No evidence of trauma or deformity  Skin: Warm and dry; scattered /raised pruritic papules. Neuro: Alert and appropriate  Psychiatric: Anxious      MEDICAL DECISION MAKING  I am the first provider for this patient. I reviewed the vital signs, available nursing notes, past medical history, past surgical history, family history and social history. Vital Signs-Reviewed the patient's vital signs. Patient Vitals for the past 12 hrs:   Temp Pulse Resp BP SpO2   07/26/17 0211 97 °F (36.1 °C) 85 16 143/90 99 %       Pulse Oximetry Analysis - Normal 99% on RA    Cardiac Monitor:   Rate: 85 bpm  Rhythm: Normal Sinus Rhythm     Old Medical Records: Old medical records. Nursing notes. Procedures:   Procedures    ED Course:  2:23 AM  Initial assessment performed. The patients presenting problems have been discussed, and they are in agreement with the care plan formulated and outlined with them. I have encouraged them to ask questions as they arise throughout their visit. Medications Given in the ED:  Medications - No data to display    DISCHARGE NOTE  2:36 AM  Pt has been reexamined. Patient has no new complaints, changes, or physical findings. Care plan outlined and precautions discussed. Results were reviewed with the patient. All medications were reviewed with the patient; will d/c home with Prednisone and Benadryl. All of pt's questions and concerns were addressed. Patient was instructed and agrees to follow up with PCP, as well as to return to the ED upon further deterioration. Patient is ready to go home. DIAGNOSIS  Clinical Impression:   1. Rash and other nonspecific skin eruption    2. Other chronic pain         Discussion:  Pt with multiple chronic health issues, presenting for pruritic rash and increased spasms as she has run out of her medications. Will  review and treat appropriately. PLAN: D/C  Follow-up Information     Follow up With Details Comments Contact Info    Rafael Curiel MD Schedule an appointment as soon as possible for a visit for PCP follow up, as needed [address]a Patel [phone] [facility] EMERGENCY DEPT  As needed, If symptoms worsen [address] [phone]          Current Discharge Medication List      START taking these medications    Details   predniSONE (STERAPRED DS) 10 mg dose pack 6 day dose pack per package instructions  Qty: 1 Package, Refills: 0      diphenhydrAMINE (BENADRYL) 25 mg capsule Take 2 Caps by mouth every six (6) hours as needed for up to 4 days.   Qty: 30 Cap, Refills: 0         CONTINUE these medications which have NOT CHANGED    Details   tiZANidine (ZANAFLEX) 2 mg capsule Take 1 Cap by mouth three (3) times daily. Qty: 40 Cap, Refills: 0             _______________________________   Attestation: This note is prepared by Peter Washington, acting as Scribe for Sana Raines MD; at 2:23 AM on 7/26/2017. Sana Raines MD: The scribe's documentation has been prepared under my direction and personally reviewed by me in its entirety.  I confirm that the note above accurately reflects all work, treatment, procedures, and medical decision making performed by me.     _______________________________

## 2017-07-26 NOTE — ED TRIAGE NOTES
Bilateral knee pain, leg pain and spasms \"for months, I have been checked for blood clots and they can't find anything\". Also states has had itchy rash on trunk x 3 days  Sepsis Screening completed    (  )Patient meets SIRS criteria. (xx  )Patient does not meet SIRS criteria.       SIRS Criteria is achieved when two or more of the following are present   Temperature < 96.8°F (36°C) or > 100.9°F (38.3°C)   Heart Rate > 90 beats per minute   Respiratory Rate > 20 breaths per minute   WBC count > 12,000 or <4,000 or > 10% bands

## 2018-12-04 ENCOUNTER — HOSPITAL ENCOUNTER (EMERGENCY)
Age: 53
Discharge: HOME | End: 2018-12-04
Attending: EMERGENCY MEDICINE
Payer: COMMERCIAL

## 2018-12-04 ENCOUNTER — APPOINTMENT (OUTPATIENT)
Dept: GENERAL RADIOLOGY | Age: 53
End: 2018-12-04
Attending: EMERGENCY MEDICINE
Payer: COMMERCIAL

## 2018-12-04 VITALS
OXYGEN SATURATION: 100 % | WEIGHT: 220 LBS | DIASTOLIC BLOOD PRESSURE: 89 MMHG | BODY MASS INDEX: 36.65 KG/M2 | TEMPERATURE: 98.5 F | HEIGHT: 65 IN | SYSTOLIC BLOOD PRESSURE: 159 MMHG | HEART RATE: 89 BPM | RESPIRATION RATE: 16 BRPM
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
## 2018-12-04 DIAGNOSIS — S83.8X2D SPRAIN OF OTHER LIGAMENT OF LEFT KNEE, SUBSEQUENT ENCOUNTER: ICD-10-CM

## 2018-12-04 DIAGNOSIS — S33.5XXA LUMBAR SPRAIN, INITIAL ENCOUNTER: ICD-10-CM

## 2018-12-04 PROCEDURE — 73564 X-RAY EXAM KNEE 4 OR MORE: CPT

## 2018-12-04 PROCEDURE — 72100 X-RAY EXAM L-S SPINE 2/3 VWS: CPT

## 2018-12-04 PROCEDURE — 99282 EMERGENCY DEPT VISIT SF MDM: CPT

## 2018-12-04 RX ORDER — AMLODIPINE BESYLATE 10 MG/1
5 TABLET ORAL DAILY
Refills: 3 | COMMUNITY
Start: 2018-09-12

## 2018-12-04 RX ORDER — ALPRAZOLAM 1 MG/1
1 TABLET ORAL
COMMUNITY

## 2018-12-04 RX ORDER — DILTIAZEM HYDROCHLORIDE 240 MG/1
240 CAPSULE, COATED, EXTENDED RELEASE ORAL DAILY
COMMUNITY
Start: 2017-11-07 | End: 2019-12-21

## 2018-12-04 NOTE — ED TRIAGE NOTES
Pt presents to ED with c/o lower back pain and bilateral knee pain. Pt has chronic pain in her back and knees. Pt is currently in pain management for this. Pt has not tried her home medication today. Pt states her pain is worse after having a near fall at a store this morning. Pt drove herself to ED and does not have anyone with her.

## 2018-12-04 NOTE — ED NOTES
Report received from off-going nurse. Assumed care of pt at this time. Introduced self to pt. Pt is resting comfortably in NAD. VS stable. Pt is a/o and able to express needs using complete sentences. Pt not expressing any needs at this time. Pt updated on POC. Call bell w/i reach; will cont to monitor.

## 2018-12-04 NOTE — ED PROVIDER NOTES
EMERGENCY DEPARTMENT HISTORY AND PHYSICAL EXAM    Date: 12/4/2018  Patient Name: Neeta Rader    History of Presenting Illness     Chief Complaint   Patient presents with    Back Pain    Knee Pain         History Provided By: Patient    Chief Complaint: lower back pain  Duration: this morning   Timing:  Acute  Location: lower back  Severity: Severe  Modifying Factors: no medications  Associated Symptoms: acute on chronic bilateral knee pain, and left knee swelling    Additional History (Context):   10:38 AM   Neeta Rader is a 48 y.o. female with PMHX of arthritis and chronic pain, and Shx of left knee replacement who typically ambulates with a cane and presents to the emergency department C/O acute on chronic lower back pain s/p stumbling forward into a wall this morning. Denies a fall to the ground. Associated sxs include acute on chronic bilateral knee pain, and left knee swelling. She typically takes Tramadol for her chronic pain; last dose yesterday. Pt is followed by an orthopedist and pain management, and was previously evaluated by MRI spine. Pt denies wounds, head injury, and any other sxs or complaints. PCP: Carmen Garcia MD    Current Outpatient Medications   Medication Sig Dispense Refill    ALPRAZolam (XANAX) 1 mg tablet Take 1 mg by mouth three (3) times daily as needed for Anxiety.  dilTIAZem CD (CARDIZEM CD) 240 mg ER capsule Take 240 mg by mouth daily.  traMADol (ULTRAM) 50 mg tablet Take 1 Tab by mouth every eight (8) hours as needed for Pain (for severe pain, caution can cause drowsiness; avoid with vicodin). Max Daily Amount: 150 mg. 10 Tab 0    amLODIPine (NORVASC) 10 mg tablet Take 10 mg by mouth daily. 3    ibuprofen (MOTRIN) 600 mg tablet Take 1 Tab by mouth every six (6) hours as needed for Pain. 20 Tab 0    lisinopril (PRINIVIL, ZESTRIL) 20 mg tablet Take 20 mg by mouth daily.  Indications: HYPERTENSION         Past History     Past Medical History:  Past Medical History:   Diagnosis Date    Anxiety     Arthritis     Cancer ([facility] [address]     endometrial     Chronic pain     legs knees headaches    Coagulation defects     anemia    Depression     HX OTHER MEDICAL     muscle cramping    Hypertension     Other ill-defined conditions(799.89)     muscle spasms in legs    Unspecified sleep apnea     in the past had gastric bypass       Past Surgical History:  Past Surgical History:   Procedure Laterality Date    HX CHOLECYSTECTOMY      HX GASTRIC BYPASS  2005    HX HYSTERECTOMY      HX KNEE REPLACEMENT      HX ORTHOPAEDIC      LCTR       Family History:  Family History   Problem Relation Age of Onset    Malignant Hyperthermia Neg Hx     Pseudocholinesterase Deficiency Neg Hx     Delayed Awakening Neg Hx     Post-op Nausea/Vomiting Neg Hx     Emergence Delirium Neg Hx     Post-op Cognitive Dysfunction Neg Hx     Other Neg Hx        Social History:  Social History     Tobacco Use    Smoking status: Never Smoker   Substance Use Topics    Alcohol use: No     Alcohol/week: 0.0 oz    Drug use: No       Allergies: Allergies   Allergen Reactions    Percocet [Oxycodone-Acetaminophen] Itching         Review of Systems   Review of Systems   Musculoskeletal: Positive for arthralgias (bilateral knees), back pain and joint swelling (left knee swelling). Skin: Negative for wound. All other systems reviewed and are negative. Physical Exam     Vitals:    12/04/18 1038   BP: (!) 167/107   Pulse: 97   Resp: 16   Temp: 98.2 °F (36.8 °C)   SpO2: 100%   Weight: 99.8 kg (220 lb)   Height: 5' 4.5\" (1.638 m)     Physical Exam   Constitutional: She is oriented to person, place, and time. She appears well-developed and well-nourished. No distress. HENT:   Head: Normocephalic and atraumatic. Right Ear: External ear normal.   Left Ear: External ear normal.   Mouth/Throat: Oropharynx is clear and moist. No oropharyngeal exudate.    Eyes: Conjunctivae and EOM are normal. Pupils are equal, round, and reactive to light. No scleral icterus. No pallor   Neck: Normal range of motion. Neck supple. No JVD present. No tracheal deviation present. No thyromegaly present. Cardiovascular: Normal rate, regular rhythm and normal heart sounds. Pulmonary/Chest: Effort normal and breath sounds normal. No stridor. No respiratory distress. Abdominal: Soft. Bowel sounds are normal. She exhibits no distension. There is no tenderness. There is no rebound and no guarding. Musculoskeletal: Normal range of motion. She exhibits no edema. Left knee: She exhibits swelling (diffuse). Lumbar back: She exhibits tenderness. No soft tissue injuries. Her left knee is able to support her weight. Lymphadenopathy:     She has no cervical adenopathy. Neurological: She is alert and oriented to person, place, and time. She has normal reflexes. No cranial nerve deficit. Coordination normal.   Skin: Skin is warm and dry. No rash noted. She is not diaphoretic. No erythema. Psychiatric: She has a normal mood and affect. Her behavior is normal. Judgment and thought content normal.   Nursing note and vitals reviewed. Diagnostic Study Results     Labs -   No results found for this or any previous visit (from the past 12 hour(s)). Radiologic Studies -     11:25 AM  RADIOLOGY FINDINGS  Lumbar spine X-ray shows degenerative changes  Pending review by Radiologist  Recorded by ANA LILIA Garcia, as dictated by Rody Taylor MD     11:25 AM  RADIOLOGY FINDINGS  Left knee X-ray shows hardware in patient's knee is intact. Pending review by Radiologist  Recorded by ANA LILIA Garcia, as dictated by Rody Taylor MD     XR KNEE LT MIN 4 V    (Results Pending)   XR SPINE LUMB 2 OR 3 V    (Results Pending)     Medications given in the ED-  Medications - No data to display      Medical Decision Making   I am the first provider for this patient.     I reviewed the vital signs, available nursing notes, past medical history, past surgical history, family history and social history. Vital Signs-Reviewed the patient's vital signs. Pulse Oximetry Analysis - 100% on room air     Records Reviewed: Nursing Notes    Procedures:  Procedures    ED Course:   10:38 AM Initial assessment performed. The patients presenting problems have been discussed, and they are in agreement with the care plan formulated and outlined with them. I have encouraged them to ask questions as they arise throughout their visit. Diagnosis and Disposition       DISCHARGE NOTE:  11:26 AM   Dina Dc's  results have been reviewed with her. She has been counseled regarding her diagnosis, treatment, and plan. She verbally conveys understanding and agreement of the signs, symptoms, diagnosis, treatment and prognosis and additionally agrees to follow up as discussed. She also agrees with the care-plan and conveys that all of her questions have been answered. I have also provided discharge instructions for her that include: educational information regarding their diagnosis and treatment, and list of reasons why they would want to return to the ED prior to their follow-up appointment, should her condition change. She has been provided with education for proper emergency department utilization. CLINICAL IMPRESSION:    1. Fall, initial encounter    2. Lumbar sprain, initial encounter        PLAN:  1. D/C Home  2. Current Discharge Medication List        3.    Follow-up Information     Follow up With Specialties Details Why Contact Info    Rebekah Reyes MD Family Practice Schedule an appointment as soon as possible for a visit in 2 days For primary care follow up [phone] [facility] EMERGENCY DEPT Emergency Medicine  As needed, If symptoms worsen [address] [facility] [address] [phone] _______________________________    Attestations: This note is prepared by Erin Foss, acting as Scribe for Robbin Raphael MD.    Robbin Raphael MD:  The scribe's documentation has been prepared under my direction and personally reviewed by me in its entirety.   I confirm that the note above accurately reflects all work, treatment, procedures, and medical decision making performed by me.  _______________________________

## 2018-12-04 NOTE — DISCHARGE INSTRUCTIONS
Back Strain: Care Instructions  Overview    A back strain happens when you overstretch, or pull, a muscle in your back. You may hurt your back in an accident or when you exercise or lift something. Sometimes you may not know how you hurt your back. Most back pain will get better with rest and time. You can take care of yourself at home to help your back heal.  Follow-up care is a key part of your treatment and safety. Be sure to make and go to all appointments, and call your doctor if you are having problems. It's also a good idea to know your test results and keep a list of the medicines you take. How can you care for yourself at home? · Try to stay as active as you can, but stop or reduce any activity that causes pain. · Put ice or a cold pack on the sore muscle for 10 to 20 minutes at a time to stop swelling. Try this every 1 to 2 hours for 3 days (when you are awake) or until the swelling goes down. Put a thin cloth between the ice pack and your skin. · After 2 or 3 days, apply a heating pad on low or a warm cloth to your back. Some doctors suggest that you go back and forth between hot and cold treatments. · Take pain medicines exactly as directed. ? If the doctor gave you a prescription medicine for pain, take it as prescribed. ? If you are not taking a prescription pain medicine, ask your doctor if you can take an over-the-counter medicine. · Try sleeping on your side with a pillow between your legs. Or put a pillow under your knees when you lie on your back. These measures can ease pain in your lower back. · Return to your usual level of activity slowly. When should you call for help? Call 911 anytime you think you may need emergency care. For example, call if:    · You are unable to move a leg at all.   [facility] your doctor now or seek immediate medical care if:    · You have new or worse symptoms in your legs, belly, or buttocks.  Symptoms may include:  ? Numbness or tingling. ? Weakness. ? Pain.     · You lose bladder or bowel control.    Watch closely for changes in your health, and be sure to contact your doctor if:    · You have a fever, lose weight, or don't feel well.     · You are not getting better as expected. Where can you learn more? Go to http://camden-kiran.info/. Enter O666 in the search box to learn more about \"Back Strain: Care Instructions. \"  Current as of: November 29, 2017  Content Version: 11.8  © 6760-5680 [employer]. Care instructions adapted under license by [employer] (which disclaims liability or warranty for this information). If you have questions about a medical condition or this instruction, always ask your healthcare professional. [address] any warranty or liability for your use of this information.

## 2019-07-07 ENCOUNTER — APPOINTMENT (OUTPATIENT)
Dept: GENERAL RADIOLOGY | Age: 54
End: 2019-07-07
Attending: EMERGENCY MEDICINE
Payer: MEDICAID

## 2019-07-07 ENCOUNTER — HOSPITAL ENCOUNTER (EMERGENCY)
Age: 54
Discharge: HOME | End: 2019-07-08
Attending: EMERGENCY MEDICINE | Admitting: EMERGENCY MEDICINE
Payer: MEDICAID

## 2019-07-07 VITALS
RESPIRATION RATE: 14 BRPM | SYSTOLIC BLOOD PRESSURE: 97 MMHG | BODY MASS INDEX: 35.51 KG/M2 | HEART RATE: 97 BPM | TEMPERATURE: 99.6 F | WEIGHT: 208 LBS | HEIGHT: 64 IN | OXYGEN SATURATION: 100 % | DIASTOLIC BLOOD PRESSURE: 67 MMHG

## 2019-07-07 DIAGNOSIS — J02.9 ACUTE VIRAL PHARYNGITIS: Primary | ICD-10-CM

## 2019-07-07 DIAGNOSIS — J20.8 VIRAL BRONCHITIS: ICD-10-CM

## 2019-07-07 PROCEDURE — 71046 X-RAY EXAM CHEST 2 VIEWS: CPT

## 2019-07-07 PROCEDURE — 99282 EMERGENCY DEPT VISIT SF MDM: CPT

## 2019-07-07 PROCEDURE — 87070 CULTURE OTHR SPECIMN AEROBIC: CPT

## 2019-07-08 LAB — S PYO AG THROAT QL: NEGATIVE

## 2019-07-08 PROCEDURE — 87880 STREP A ASSAY W/OPTIC: CPT

## 2019-07-08 RX ORDER — CODEINE PHOSPHATE AND GUAIFENESIN 10; 100 MG/5ML; MG/5ML
5 SOLUTION ORAL
Qty: 60 ML | Refills: 0 | Status: SHIPPED | OUTPATIENT
Start: 2019-07-08 | End: 2019-07-11

## 2019-07-08 RX ORDER — BENZONATATE 100 MG/1
100 CAPSULE ORAL
Qty: 30 CAP | Refills: 0 | Status: SHIPPED | OUTPATIENT
Start: 2019-07-08 | End: 2019-07-15

## 2019-07-08 NOTE — ED PROVIDER NOTES
EMERGENCY DEPARTMENT HISTORY AND PHYSICAL EXAM    Date: 7/7/2019  Patient Name: Gema Lauren    History of Presenting Illness     Chief Complaint   Patient presents with    Sore Throat         History Provided By: Patient    Additional History (Context):   11:28 PM    Gema Lauren is a 48 y.o. female with pertinent PMHx of anxiety, HTN, anemia, and chronic pain presenting ambulatory to the ED c/o worsening aching sore throat and productive cough (green phlegm) x ~2-3 days. Pt states that her pain is worse with swallowing. Pt notes associated symptoms of rhinorrhea and chills. Pt was seen at an ER for these sxs previously, and has been complying with treatment with no relief. Pt denies any sick contacts with similar sxs. Pt denies any history of pulmonary disease. Pt notes a dense FHx of HTN and DM. Pt specifically denies any N/V/D.    PCP: Anna Sparks MD  Pt does not smoke tobacco, drink EtOH excessively, or do illicit drugs. There are no other complaints, changes, or physical findings at this time. Current Outpatient Medications   Medication Sig Dispense Refill    benzonatate (TESSALON PERLES) 100 mg capsule Take 1 Cap by mouth three (3) times daily as needed for Cough for up to 7 days. 30 Cap 0    guaiFENesin-codeine (ROBITUSSIN AC) 100-10 mg/5 mL solution Take 5 mL by mouth three (3) times daily as needed for Cough for up to 3 days. Max Daily Amount: 15 mL. 60 mL 0    ALPRAZolam (XANAX) 1 mg tablet Take 1 mg by mouth three (3) times daily as needed for Anxiety.  amLODIPine (NORVASC) 10 mg tablet Take 10 mg by mouth daily. 3    dilTIAZem CD (CARDIZEM CD) 240 mg ER capsule Take 240 mg by mouth daily.  ibuprofen (MOTRIN) 600 mg tablet Take 1 Tab by mouth every six (6) hours as needed for Pain. 20 Tab 0    traMADol (ULTRAM) 50 mg tablet Take 1 Tab by mouth every eight (8) hours as needed for Pain (for severe pain, caution can cause drowsiness; avoid with vicodin).  Max Daily Amount: 150 mg. 10 Tab 0    lisinopril (PRINIVIL, ZESTRIL) 20 mg tablet Take 20 mg by mouth daily. Indications: HYPERTENSION         Past History     Past Medical History:  Past Medical History:   Diagnosis Date    Anxiety     Arthritis     Cancer ([address]     endometrial     Chronic pain     legs knees headaches    Coagulation defects     anemia    Depression     HX OTHER MEDICAL     muscle cramping    Hypertension     Other ill-defined conditions(799.89)     muscle spasms in legs    Unspecified sleep apnea     in the past had gastric bypass       Past Surgical History:  Past Surgical History:   Procedure Laterality Date    HX CHOLECYSTECTOMY      HX GASTRIC BYPASS  2005    HX HYSTERECTOMY      HX KNEE REPLACEMENT      HX ORTHOPAEDIC      LCTR       Family History:  Family History   Problem Relation Age of Onset    Malignant Hyperthermia Neg Hx     Pseudocholinesterase Deficiency Neg Hx     Delayed Awakening Neg Hx     Post-op Nausea/Vomiting Neg Hx     Emergence Delirium Neg Hx     Post-op Cognitive Dysfunction Neg Hx     Other Neg Hx        Social History:  Social History     Tobacco Use    Smoking status: Never Smoker    Smokeless tobacco: Never Used   Substance Use Topics    Alcohol use: No     Alcohol/week: 0.0 oz    Drug use: No       Allergies: Allergies   Allergen Reactions    Percocet [Oxycodone-Acetaminophen] Itching         Review of Systems   Review of Systems   Constitutional: Positive for chills. HENT: Positive for rhinorrhea and sore throat. Respiratory: Positive for cough. Gastrointestinal: Negative for diarrhea, nausea and vomiting. All other systems reviewed and are negative. Physical Exam     Vitals:    07/07/19 2314   BP: 97/67   Pulse: 97   Resp: 14   Temp: 99.6 °F (37.6 °C)   SpO2: 100%   Weight: 94.3 kg (208 lb)   Height: 5' 4\" (1.626 m)     Physical Exam   Constitutional: She is oriented to person, place, and time.  She appears well-developed and well-nourished. No distress. HENT:   Head: Normocephalic and atraumatic. Right Ear: External ear normal.   Left Ear: External ear normal.   Mouth/Throat: No oropharyngeal exudate. Erythema and hypertrophy to her sinuses, without purulence  Throat with erythema, though difficult to examen as she is mallampati 3. Eyes: Pupils are equal, round, and reactive to light. Conjunctivae and EOM are normal. No scleral icterus. No pallor   Neck: Normal range of motion. Neck supple. No JVD present. No tracheal deviation present. No thyromegaly present. Cardiovascular: Normal rate, regular rhythm and normal heart sounds. Pulmonary/Chest: Effort normal and breath sounds normal. No stridor. No respiratory distress. Abdominal: Soft. Bowel sounds are normal. She exhibits no distension. There is no tenderness. There is no rebound and no guarding. Musculoskeletal: Normal range of motion. She exhibits no edema or tenderness. No soft tissue injuries   Lymphadenopathy:     She has no cervical adenopathy. Neurological: She is alert and oriented to person, place, and time. She has normal reflexes. No cranial nerve deficit. Coordination normal.   Skin: Skin is warm and dry. No rash noted. She is not diaphoretic. No erythema. Psychiatric: She has a normal mood and affect. Her behavior is normal. Judgment and thought content normal.   Nursing note and vitals reviewed. Diagnostic Study Results     Labs -     Recent Results (from the past 12 hour(s))   POC GROUP A STREP    Collection Time: 07/08/19 12:09 AM   Result Value Ref Range    Group A strep (POC) NEGATIVE  NEG         Radiologic Studies -   XR CHEST PA LAT    (Results Pending)     12:12 AM  RADIOLOGY FINDINGS  Chest X-ray shows no acute findings  Pending review by Radiologist  Recorded by ANA LILIA Bateman, as dictated by Alli Kilgore MD.    Medical Decision Making   I am the first provider for this patient.     I reviewed the vital signs, available nursing notes, past medical history, past surgical history, family history and social history. Vital Signs-Reviewed the patient's vital signs. Pulse Oximetry Analysis - 100% on RA     Records Reviewed: Nursing Notes and Old Medical Records    Provider Notes (Medical Decision Making): DDx: sxs of viral illness. XR shows no focal infiltrate. Recommend persistent use of OTC meds. Steroids are a poor choice in her given GI hx. Will just use cough medicine. PROCEDURES:  Procedures    MEDICATIONS GIVEN IN THE ED:  Medications - No data to display     ED COURSE:   11:28 PM   Initial assessment performed. Diagnosis and Disposition     DISCHARGE NOTE:  12:12 AM  The patient is ready for discharge. The patient's signs, symptoms, diagnosis, and discharge instructions have been discussed and the patient and/or family has conveyed their understanding. The patient and/or family is to follow up as recommended or return to the ER should their symptoms worsen. Plan has been discussed and the patient and/or family is in agreement. Written by Mitul Meeks ED Scribe, as dictated by Malka Champagne MD.     CLINICAL IMPRESSION:  1. Acute viral pharyngitis    2. Viral bronchitis        PLAN:  1. D/C Home  2. Current Discharge Medication List      START taking these medications    Details   benzonatate (TESSALON PERLES) 100 mg capsule Take 1 Cap by mouth three (3) times daily as needed for Cough for up to 7 days. Qty: 30 Cap, Refills: 0      guaiFENesin-codeine (ROBITUSSIN AC) 100-10 mg/5 mL solution Take 5 mL by mouth three (3) times daily as needed for Cough for up to 3 days. Max Daily Amount: 15 mL. Qty: 60 mL, Refills: 0    Associated Diagnoses: Acute viral pharyngitis; Viral bronchitis         CONTINUE these medications which have NOT CHANGED    Details   ALPRAZolam (XANAX) 1 mg tablet Take 1 mg by mouth three (3) times daily as needed for Anxiety.       amLODIPine (NORVASC) 10 mg tablet Take 10 mg by mouth daily. Refills: 3      dilTIAZem CD (CARDIZEM CD) 240 mg ER capsule Take 240 mg by mouth daily. ibuprofen (MOTRIN) 600 mg tablet Take 1 Tab by mouth every six (6) hours as needed for Pain. Qty: 20 Tab, Refills: 0      traMADol (ULTRAM) 50 mg tablet Take 1 Tab by mouth every eight (8) hours as needed for Pain (for severe pain, caution can cause drowsiness; avoid with vicodin). Max Daily Amount: 150 mg.  Qty: 10 Tab, Refills: 0      lisinopril (PRINIVIL, ZESTRIL) 20 mg tablet Take 20 mg by mouth daily. Indications: HYPERTENSION           3. Follow-up Information     Follow up With Specialties Details Why Contact Info    Phillip French MD Family Practice Schedule an appointment as soon as possible for a visit for primary care follow up [address] [facility]  [phone]      [facility] EMERGENCY DEPT Emergency Medicine  As needed, If symptoms worsen [address] [place] [address]  [phone]        _______________________________    Attestations: This note is prepared by Nohelia Yeung, acting as Scribe for Kierra Cantu. Beth Cobb MD.    Kierra Cantu. Beth Cobb MD:  The scribe's documentation has been prepared under my direction and personally reviewed by me in its entirety.  I confirm that the note above accurately reflects all work, treatment, procedures, and medical decision making performed by me.  _______________________________

## 2019-07-08 NOTE — DISCHARGE INSTRUCTIONS
Patient Education        Viral Infections: Care Instructions  Your Care Instructions    You don't feel well, but it's not clear what's causing it. You may have a viral infection. Viruses cause many illnesses, such as the common cold, influenza, fever, rashes, and the diarrhea, nausea, and vomiting that are often called \"stomach flu. \" You may wonder if antibiotic medicines could make you feel better. But antibiotics only treat infections caused by bacteria. They don't work on viruses. The good news is that viral infections usually aren't serious. Most will go away in a few days without medical treatment. In the meantime, there are a few things you can do to make yourself more comfortable. Follow-up care is a key part of your treatment and safety. Be sure to make and go to all appointments, and call your doctor if you are having problems. It's also a good idea to know your test results and keep a list of the medicines you take. How can you care for yourself at home? · Get plenty of rest if you feel tired. · Take an over-the-counter pain medicine if needed, such as acetaminophen (Tylenol), ibuprofen (Advil, Motrin), or naproxen (Aleve). Read and follow all instructions on the label. · Be careful when taking over-the-counter cold or flu medicines and Tylenol at the same time. Many of these medicines have acetaminophen, which is Tylenol. Read the labels to make sure that you are not taking more than the recommended dose. Too much acetaminophen (Tylenol) can be harmful. · Drink plenty of fluids, enough so that your urine is light yellow or clear like water. If you have kidney, heart, or liver disease and have to limit fluids, talk with your doctor before you increase the amount of fluids you drink. · Stay home from work, school, and other public places while you have a fever. When should you call for help? Call 911 anytime you think you may need emergency care.  For example, call if:    · You have severe trouble breathing.     · You passed out (lost consciousness).    Call your doctor now or seek immediate medical care if:    · You seem to be getting much sicker.     · You have a new or higher fever.     · You have blood in your stools.     · You have new belly pain, or your pain gets worse.     · You have a new rash.    Watch closely for changes in your health, and be sure to contact your doctor if:    · You start to get better and then get worse.     · You do not get better as expected. Where can you learn more? Go to http://camden-kiran.info/. Enter M130 in the search box to learn more about \"Viral Infections: Care Instructions. \"  Current as of: July 30, 2018  Content Version: 11.9  © 0268-0622 [employer]. Care instructions adapted under license by [employer] (which disclaims liability or warranty for this information). If you have questions about a medical condition or this instruction, always ask your healthcare professional. [place] [address] any warranty or liability for your use of this information. Bronchitis: Care Instructions  Your Care Instructions    Bronchitis is inflammation of the bronchial tubes, which carry air to the lungs. The tubes swell and produce mucus, or phlegm. The mucus and inflamed bronchial tubes make you cough. You may have trouble breathing. Most cases of bronchitis are caused by viruses like those that cause colds. Antibiotics usually do not help and they may be harmful. Bronchitis usually develops rapidly and lasts about 2 to 3 weeks in otherwise healthy people. Follow-up care is a key part of your treatment and safety. Be sure to make and go to all appointments, and call your doctor if you are having problems. It's also a good idea to know your test results and keep a list of the medicines you take. How can you care for yourself at home? · Take all medicines exactly as prescribed.  Call your doctor if you think you are having a problem with your medicine. · Get some extra rest.  · Take an over-the-counter pain medicine, such as acetaminophen (Tylenol), ibuprofen (Advil, Motrin), or naproxen (Aleve) to reduce fever and relieve body aches. Read and follow all instructions on the label. · Do not take two or more pain medicines at the same time unless the doctor told you to. Many pain medicines have acetaminophen, which is Tylenol. Too much acetaminophen (Tylenol) can be harmful. · Take an over-the-counter cough medicine that contains dextromethorphan to help quiet a dry, hacking cough so that you can sleep. Avoid cough medicines that have more than one active ingredient. Read and follow all instructions on the label. · Breathe moist air from a humidifier, hot shower, or sink filled with hot water. The heat and moisture will thin mucus so you can cough it out. · Do not smoke. Smoking can make bronchitis worse. If you need help quitting, talk to your doctor about stop-smoking programs and medicines. These can increase your chances of quitting for good. When should you call for help? Call 911 anytime you think you may need emergency care. For example, call if:    · You have severe trouble breathing.    Call your doctor now or seek immediate medical care if:    · You have new or worse trouble breathing.     · You cough up dark brown or bloody mucus (sputum).     · You have a new or higher fever.     · You have a new rash.    Watch closely for changes in your health, and be sure to contact your doctor if:    · You cough more deeply or more often, especially if you notice more mucus or a change in the color of your mucus.     · You are not getting better as expected. Where can you learn more? Go to http://camden-kiran.info/. Enter H333 in the search box to learn more about \"Bronchitis: Care Instructions. \"  Current as of: September 5, 2018  Content Version: 11.9  © 8535-3172 [employer]. Care instructions adapted under license by [employer] (which disclaims liability or warranty for this information). If you have questions about a medical condition or this instruction, always ask your healthcare professional. [address] any warranty or liability for your use of this information.

## 2019-07-08 NOTE — ED NOTES
I have reviewed discharge instructions with the patient. The patient verbalized understanding. Patient armband removed and shredded. Pt in NAD at this time, no further needs expressed.

## 2019-07-08 NOTE — ED TRIAGE NOTES
Pt arrives ambulatory to ED with c\o sore throat, nasal congestion, and cough x 3 days, pt sts she was seen at [facility] and dx with viral pharyngitis, pt is able to make needs known speaking in complete sentences, pt in nad at this time

## 2019-07-09 RX ORDER — AZITHROMYCIN 250 MG/1
TABLET, FILM COATED ORAL
Qty: 6 TAB | Refills: 0 | Status: SHIPPED | OUTPATIENT
Start: 2019-07-09 | End: 2019-07-14

## 2019-07-09 NOTE — CALL BACK NOTE
3:08 PM 
07/09/19 Call patient and informed of official radiology reading of chest x-ray, possible early pneumonia. Will send Z-Moshe. Patient is aware. Advised to return for any worsening symptoms Jerry Peace PA-C

## 2019-07-10 LAB
BACTERIA SPEC CULT: NORMAL
BACTERIA SPEC CULT: NORMAL
SERVICE CMNT-IMP: NORMAL

## 2019-08-20 ENCOUNTER — HOSPITAL ENCOUNTER (EMERGENCY)
Age: 54
Discharge: HOME | End: 2019-08-20
Attending: EMERGENCY MEDICINE
Payer: MEDICAID

## 2019-08-20 VITALS
OXYGEN SATURATION: 100 % | HEART RATE: 94 BPM | DIASTOLIC BLOOD PRESSURE: 104 MMHG | WEIGHT: 230 LBS | RESPIRATION RATE: 20 BRPM | BODY MASS INDEX: 39.27 KG/M2 | SYSTOLIC BLOOD PRESSURE: 170 MMHG | TEMPERATURE: 98.3 F | HEIGHT: 64 IN

## 2019-08-20 DIAGNOSIS — R55 VASOVAGAL NEAR SYNCOPE: Primary | ICD-10-CM

## 2019-08-20 LAB
ANION GAP SERPL CALC-SCNC: 8 MMOL/L (ref 3–18)
ATRIAL RATE: 88 BPM
BASOPHILS # BLD: 0.1 K/UL (ref 0–0.1)
BASOPHILS NFR BLD: 1 % (ref 0–2)
BUN SERPL-MCNC: 18 MG/DL (ref 7–18)
BUN/CREAT SERPL: 19 (ref 12–20)
CALCIUM SERPL-MCNC: 8.6 MG/DL (ref 8.5–10.1)
CALCULATED P AXIS, ECG09: 20 DEGREES
CALCULATED R AXIS, ECG10: 13 DEGREES
CALCULATED T AXIS, ECG11: 45 DEGREES
CHLORIDE SERPL-SCNC: 106 MMOL/L (ref 100–111)
CK MB CFR SERPL CALC: 0.6 % (ref 0–4)
CK MB SERPL-MCNC: 1.2 NG/ML (ref 5–25)
CK SERPL-CCNC: 213 U/L (ref 26–192)
CO2 SERPL-SCNC: 28 MMOL/L (ref 21–32)
CREAT SERPL-MCNC: 0.97 MG/DL (ref 0.6–1.3)
DIAGNOSIS, 93000: NORMAL
DIFFERENTIAL METHOD BLD: ABNORMAL
EOSINOPHIL # BLD: 0.2 K/UL (ref 0–0.4)
EOSINOPHIL NFR BLD: 2 % (ref 0–5)
ERYTHROCYTE [DISTWIDTH] IN BLOOD BY AUTOMATED COUNT: 16.3 % (ref 11.6–14.5)
GLUCOSE BLD STRIP.AUTO-MCNC: 134 MG/DL (ref 70–110)
GLUCOSE BLD STRIP.AUTO-MCNC: 95 MG/DL (ref 70–110)
GLUCOSE SERPL-MCNC: 98 MG/DL (ref 74–99)
HCT VFR BLD AUTO: 33.9 % (ref 35–45)
HGB BLD-MCNC: 10.6 G/DL (ref 12–16)
LYMPHOCYTES # BLD: 2.6 K/UL (ref 0.9–3.6)
LYMPHOCYTES NFR BLD: 27 % (ref 21–52)
MCH RBC QN AUTO: 24.6 PG (ref 24–34)
MCHC RBC AUTO-ENTMCNC: 31.3 G/DL (ref 31–37)
MCV RBC AUTO: 78.7 FL (ref 74–97)
MONOCYTES # BLD: 0.5 K/UL (ref 0.05–1.2)
MONOCYTES NFR BLD: 6 % (ref 3–10)
NEUTS SEG # BLD: 6.3 K/UL (ref 1.8–8)
NEUTS SEG NFR BLD: 64 % (ref 40–73)
P-R INTERVAL, ECG05: 136 MS
PLATELET # BLD AUTO: 368 K/UL (ref 135–420)
PMV BLD AUTO: 9.6 FL (ref 9.2–11.8)
POTASSIUM SERPL-SCNC: 4.7 MMOL/L (ref 3.5–5.5)
Q-T INTERVAL, ECG07: 386 MS
QRS DURATION, ECG06: 88 MS
QTC CALCULATION (BEZET), ECG08: 467 MS
RBC # BLD AUTO: 4.31 M/UL (ref 4.2–5.3)
SODIUM SERPL-SCNC: 142 MMOL/L (ref 136–145)
TROPONIN I SERPL-MCNC: <0.02 NG/ML (ref 0–0.04)
VENTRICULAR RATE, ECG03: 88 BPM
WBC # BLD AUTO: 9.7 K/UL (ref 4.6–13.2)

## 2019-08-20 PROCEDURE — 82962 GLUCOSE BLOOD TEST: CPT

## 2019-08-20 PROCEDURE — 82550 ASSAY OF CK (CPK): CPT

## 2019-08-20 PROCEDURE — 85025 COMPLETE CBC W/AUTO DIFF WBC: CPT

## 2019-08-20 PROCEDURE — 93005 ELECTROCARDIOGRAM TRACING: CPT

## 2019-08-20 PROCEDURE — 99285 EMERGENCY DEPT VISIT HI MDM: CPT

## 2019-08-20 PROCEDURE — 80048 BASIC METABOLIC PNL TOTAL CA: CPT

## 2019-08-20 NOTE — ED PROVIDER NOTES
EMERGENCY DEPARTMENT HISTORY AND PHYSICAL EXAM    Date: 8/20/2019  Patient Name: Ok Daniels    History of Presenting Illness     Chief Complaint   Patient presents with    Dizziness         History Provided By: Patient    6:34 PM  Ok Daniels is a 48 y.o. female with PMHX of hypertension, diabetes who presents to the emergency department C/O near syncopal episode approximately 2.5 hours ago. Patient states she was standing in line in [employer] where it was hot. She began feeling lightheaded and diaphoretic and weak all over. She was able to walk to her car where she felt like she might pass out so sat down in her car with a air conditioning on ate some candy and drank a soda and slowly felt better. Her son recommended that she come in to get checked out. Patient has had similar episodes in the past that were related to hypoglycemia. She denies syncope, chest pain, room spinning dizziness, headache, vision changes, unilateral numbness or weakness, headache, and any other sxs or complaints. PCP: Jeff Downs NP    Current Outpatient Medications   Medication Sig Dispense Refill    ALPRAZolam (XANAX) 1 mg tablet Take 1 mg by mouth three (3) times daily as needed for Anxiety.  amLODIPine (NORVASC) 10 mg tablet Take 10 mg by mouth daily. 3    dilTIAZem CD (CARDIZEM CD) 240 mg ER capsule Take 240 mg by mouth daily.  ibuprofen (MOTRIN) 600 mg tablet Take 1 Tab by mouth every six (6) hours as needed for Pain. 20 Tab 0    traMADol (ULTRAM) 50 mg tablet Take 1 Tab by mouth every eight (8) hours as needed for Pain (for severe pain, caution can cause drowsiness; avoid with vicodin). Max Daily Amount: 150 mg. 10 Tab 0    lisinopril (PRINIVIL, ZESTRIL) 20 mg tablet Take 20 mg by mouth daily.  Indications: HYPERTENSION         Past History     Past Medical History:  Past Medical History:   Diagnosis Date    Anxiety     Arthritis     Cancer ([facility] [address]     endometrial     Chronic pain legs knees headaches    Coagulation defects     anemia    Depression     HX OTHER MEDICAL     muscle cramping    Hypertension     Other ill-defined conditions(799.89)     muscle spasms in legs    Unspecified sleep apnea     in the past had gastric bypass       Past Surgical History:  Past Surgical History:   Procedure Laterality Date    HX CHOLECYSTECTOMY      HX GASTRIC BYPASS  2005    HX HYSTERECTOMY      HX KNEE REPLACEMENT      HX ORTHOPAEDIC      LCTR       Family History:  Family History   Problem Relation Age of Onset    Malignant Hyperthermia Neg Hx     Pseudocholinesterase Deficiency Neg Hx     Delayed Awakening Neg Hx     Post-op Nausea/Vomiting Neg Hx     Emergence Delirium Neg Hx     Post-op Cognitive Dysfunction Neg Hx     Other Neg Hx        Social History:  Social History     Tobacco Use    Smoking status: Never Smoker    Smokeless tobacco: Never Used   Substance Use Topics    Alcohol use: No     Alcohol/week: 0.0 standard drinks    Drug use: No       Allergies: Allergies   Allergen Reactions    Percocet [Oxycodone-Acetaminophen] Itching         Review of Systems   Review of Systems   Constitutional: Negative for fever. Neurological: Positive for light-headedness. All other systems reviewed and are negative. Physical Exam     Vitals:    08/20/19 1630 08/20/19 2051 08/20/19 2054 08/20/19 2055   BP: 136/81 (!) 151/110 (!) 165/105 (!) 170/104   Pulse: 99 84 86 94   Resp: 20      Temp: 98.3 °F (36.8 °C)      SpO2: 100%   100%   Weight: 104.3 kg (230 lb)      Height: 5' 4\" (1.626 m)        Physical Exam    Vital signs and nursing notes reviewed. CONSTITUTIONAL: Alert. Well-appearing; well-nourished; in no apparent distress. HEAD: Normocephalic; atraumatic. EYES: PERRL; EOM's intact. No nystagmus. Conjunctiva clear. ENT: TM's normal. External ear normal. Normal nose; no rhinorrhea. Normal pharynx. Moist mucus membranes.   NECK: Supple; FROM without difficulty, non-tender; no cervical lymphadenopathy. CV: Normal S1, S2; no murmurs, rubs, or gallops. No chest wall tenderness. RESPIRATORY: Normal chest excursion with respiration; breath sounds clear and equal bilaterally; no wheezes, rhonchi, or rales. GI: Non-distended; non-tender. EXT: Normal ROM in all four extremities; non-tender to palpation. No edema or calf tenderness  SKIN: Normal for age and race; warm; dry; good turgor; no apparent lesions or exudate. NEURO: A & O x3. Cranial nerves 2-12 intact. Motor 5/5 bilaterally. Sensation intact. Normal speech. Normal coordination including finger-to-nose. No pronator drift. PSYCH:  Mood and affect appropriate. Diagnostic Study Results     Labs -     Recent Results (from the past 12 hour(s))   GLUCOSE, POC    Collection Time: 08/20/19  4:34 PM   Result Value Ref Range    Glucose (POC) 134 (H) 70 - 110 mg/dL   EKG, 12 LEAD, INITIAL    Collection Time: 08/20/19  5:32 PM   Result Value Ref Range    Ventricular Rate 88 BPM    Atrial Rate 88 BPM    P-R Interval 136 ms    QRS Duration 88 ms    Q-T Interval 386 ms    QTC Calculation (Bezet) 467 ms    Calculated P Axis 20 degrees    Calculated R Axis 13 degrees    Calculated T Axis 45 degrees    Diagnosis       Normal sinus rhythm  Cannot rule out Anterior infarct , age undetermined  Abnormal ECG  When compared with ECG of 09-MAY-2014 08:00,  No significant change was found     CBC WITH AUTOMATED DIFF    Collection Time: 08/20/19  7:20 PM   Result Value Ref Range    WBC 9.7 4.6 - 13.2 K/uL    RBC 4.31 4.20 - 5.30 M/uL    HGB 10.6 (L) 12.0 - 16.0 g/dL    HCT 33.9 (L) 35.0 - 45.0 %    MCV 78.7 74.0 - 97.0 FL    MCH 24.6 24.0 - 34.0 PG    MCHC 31.3 31.0 - 37.0 g/dL    RDW 16.3 (H) 11.6 - 14.5 %    PLATELET 561 438 - 541 K/uL    MPV 9.6 9.2 - 11.8 FL    NEUTROPHILS 64 40 - 73 %    LYMPHOCYTES 27 21 - 52 %    MONOCYTES 6 3 - 10 %    EOSINOPHILS 2 0 - 5 %    BASOPHILS 1 0 - 2 %    ABS.  NEUTROPHILS 6.3 1.8 - 8.0 K/UL    ABS. LYMPHOCYTES 2.6 0.9 - 3.6 K/UL    ABS. MONOCYTES 0.5 0.05 - 1.2 K/UL    ABS. EOSINOPHILS 0.2 0.0 - 0.4 K/UL    ABS. BASOPHILS 0.1 0.0 - 0.1 K/UL    DF AUTOMATED     CARDIAC PANEL,(CK, CKMB & TROPONIN)    Collection Time: 08/20/19  7:20 PM   Result Value Ref Range     (H) 26 - 192 U/L    CK - MB 1.2 <3.6 ng/ml    CK-MB Index 0.6 0.0 - 4.0 %    Troponin-I, QT <0.02 0.0 - 8.462 NG/ML   METABOLIC PANEL, BASIC    Collection Time: 08/20/19  7:20 PM   Result Value Ref Range    Sodium 142 136 - 145 mmol/L    Potassium 4.7 3.5 - 5.5 mmol/L    Chloride 106 100 - 111 mmol/L    CO2 28 21 - 32 mmol/L    Anion gap 8 3.0 - 18 mmol/L    Glucose 98 74 - 99 mg/dL    BUN 18 7.0 - 18 MG/DL    Creatinine 0.97 0.6 - 1.3 MG/DL    BUN/Creatinine ratio 19 12 - 20      GFR est AA >60 >60 ml/min/1.73m2    GFR est non-AA >60 >60 ml/min/1.73m2    Calcium 8.6 8.5 - 10.1 MG/DL   GLUCOSE, POC    Collection Time: 08/20/19  9:04 PM   Result Value Ref Range    Glucose (POC) 95 70 - 110 mg/dL       Radiologic Studies - none  No orders to display     CT Results  (Last 48 hours)    None        CXR Results  (Last 48 hours)    None          Medications given in the ED-  Medications - No data to display      Medical Decision Making   I am the first provider for this patient. I reviewed the vital signs, available nursing notes, past medical history, past surgical history, family history and social history. Vital Signs-Reviewed the patient's vital signs. Pulse Oximetry Analysis -100 % on room air      EKG interpretation: (Preliminary)  Normal sinus rhythm, rate 88, no STEMI    Records Reviewed: Nursing Notes      Procedures:  Procedures    ED Course:  6:34 PM   Initial assessment performed. The patients presenting problems have been discussed, and they are in agreement with the care plan formulated and outlined with them. I have encouraged them to ask questions as they arise throughout their visit.         Provider Notes (Medical Decision Making): Lillian Rivera is a 48 y.o. female who presented asymptomatic at time of arrival with complaints of a episode of lightheadedness and diaphoresis while standing in [employer] where she reports it was very hot. Seems similar to when her blood sugar dropped in the past, so sat in her air conditioned car, drink a soda and eat candy with improvement of her symptoms. He feels fine now. Vitals stable, not orthostatic. Labs unremarkable. EKG normal.  Suspect episode of hypoglycemia versus vasovagal near syncope. Encouraged to continue plenty of fluids and follow-up with her PCP. Appropriate for discharge and outpatient management at this time    Diagnosis and Disposition       DISCHARGE NOTE:    Dina Dc's  results have been reviewed with her. She has been counseled regarding her diagnosis, treatment, and plan. She verbally conveys understanding and agreement of the signs, symptoms, diagnosis, treatment and prognosis and additionally agrees to follow up as discussed. She also agrees with the care-plan and conveys that all of her questions have been answered. I have also provided discharge instructions for her that include: educational information regarding their diagnosis and treatment, and list of reasons why they would want to return to the ED prior to their follow-up appointment, should her condition change. She has been provided with education for proper emergency department utilization. CLINICAL IMPRESSION:    1. Vasovagal near syncope        PLAN:  1. D/C Home  2. Current Discharge Medication List        3.    Follow-up Information     Follow up With Specialties Details Why Contact Info    Gilda Mendoza NP Nurse Practitioner Schedule an appointment as soon as possible for a visit  [address] [place] [address] EMERGENCY DEPT Emergency Medicine  As needed, If symptoms worsen [address] [address]  [phone]        _______________________________      Please note that this dictation was completed with Synata, the computer voice recognition software. Quite often unanticipated grammatical, syntax, homophones, and other interpretive errors are inadvertently transcribed by the computer software. Please disregard these errors. Please excuse any errors that have escaped final proofreading.

## 2019-08-21 NOTE — DISCHARGE INSTRUCTIONS
Patient Education        Lightheadedness or Faintness: Care Instructions  Your Care Instructions  Lightheadedness is a feeling that you are about to faint or \"pass out. \" You do not feel as if you or your surroundings are moving. It is different from vertigo, which is the feeling that you or things around you are spinning or tilting. Lightheadedness usually goes away or gets better when you lie down. If lightheadedness gets worse, it can lead to a fainting spell. It is common to feel lightheaded from time to time. Lightheadedness usually is not caused by a serious problem. It often is caused by a short-lasting drop in blood pressure and blood flow to your head that occurs when you get up too quickly from a seated or lying position. Follow-up care is a key part of your treatment and safety. Be sure to make and go to all appointments, and call your doctor if you are having problems. It's also a good idea to know your test results and keep a list of the medicines you take. How can you care for yourself at home? · Lie down for 1 or 2 minutes when you feel lightheaded. After lying down, sit up slowly and remain sitting for 1 to 2 minutes before slowly standing up. · Avoid movements, positions, or activities that have made you lightheaded in the past.  · Get plenty of rest, especially if you have a cold or flu, which can cause lightheadedness. · Make sure you drink plenty of fluids, especially if you have a fever or have been sweating. · Do not drive or put yourself and others in danger while you feel lightheaded. When should you call for help? Call 911 anytime you think you may need emergency care. For example, call if:    · You have symptoms of a stroke. These may include:  ? Sudden numbness, tingling, weakness, or loss of movement in your face, arm, or leg, especially on only one side of your body. ? Sudden vision changes. ? Sudden trouble speaking.   ? Sudden confusion or trouble understanding simple statements. ? Sudden problems with walking or balance. ? A sudden, severe headache that is different from past headaches.     · You have symptoms of a heart attack. These may include:  ? Chest pain or pressure, or a strange feeling in the chest.  ? Sweating. ? Shortness of breath. ? Nausea or vomiting. ? Pain, pressure, or a strange feeling in the back, neck, jaw, or upper belly or in one or both shoulders or arms. ? Lightheadedness or sudden weakness. ? A fast or irregular heartbeat. After you call 911, the  may tell you to chew 1 adult-strength or 2 to 4 low-dose aspirin. Wait for an ambulance. Do not try to drive yourself.    Watch closely for changes in your health, and be sure to contact your doctor if:    · Your lightheadedness gets worse or does not get better with home care. Where can you learn more? Go to http://camdnePantechkiran.info/. Enter Q323 in the search box to learn more about \"Lightheadedness or Faintness: Care Instructions. \"  Current as of: September 23, 2018  Content Version: 12.1  © 5933-6307 [employer]. Care instructions adapted under license by [employer] (which disclaims liability or warranty for this information). If you have questions about a medical condition or this instruction, always ask your healthcare professional. [place] [address] any warranty or liability for your use of this information. Patient Education        Vasovagal Syncope: Care Instructions  Your Care Instructions    Vasovagal syncope (say \"pnt-ibn-MTP-kingal HNDQ-wwo-wda\")is sudden dizziness or fainting that can be set off by things such as pain, stress, fear, or trauma. You may sweat or feel lightheaded, sick to your stomach, or tingly. The problem causes the heart rate to slow and the blood vessels to widen, or dilate, for a short time. When this happens, blood pools in the lower body, and less blood goes to the brain.   You can usually get relief by lying down with your legs raised (elevated). This helps more blood to flow to your brain and may help relieve symptoms like feeling dizzy. Some doctors may recommend a technique that involves tensing your fists and arms. This type of fainting is often easy to predict. For example, it happens to some people when they see blood or have to get a shot. They may feel symptoms before they faint. An episode of vasovagal syncope usually responds well to self-care. Other treatment often isn't needed. But if the fainting keeps happening, your doctor may suggest further treatments. Follow-up care is a key part of your treatment and safety. Be sure to make and go to all appointments, and call your doctor if you are having problems. It's also a good idea to know your test results and keep a list of the medicines you take. How can you care for yourself at home? · Drink plenty of fluids to prevent dehydration. If you have kidney, heart, or liver disease and have to limit fluids, talk with your doctor before you increase your fluid intake. · Try to avoid things that you think may set off vasovagal syncope. · Talk to your doctor about any medicines you take. Some medicines may increase the chance of this condition occurring. · If you feel symptoms, lie down with your legs raised. Talk to your doctor about what to do if your symptoms come back. When should you call for help? Call 911 anytime you think you may need emergency care. For example, call if:    · You have symptoms of a heart problem. These may include:  ? Chest pain or pressure. ? Severe trouble breathing. ? A fast or irregular heartbeat.    Watch closely for changes in your health, and be sure to contact your doctor if:    · You have more episodes of fainting at home.     · You do not get better as expected. Where can you learn more? Go to http://camden-kiran.info/.   Enter L754 in the search box to learn more about \"Vasovagal Syncope: Care Instructions. \"  Current as of: September 23, 2018  Content Version: 12.1  © 2969-5245 [employer]. Care instructions adapted under license by [employer] (which disclaims liability or warranty for this information). If you have questions about a medical condition or this instruction, always ask your healthcare professional. [address] any warranty or liability for your use of this information.

## 2019-12-19 ENCOUNTER — APPOINTMENT (OUTPATIENT)
Dept: GENERAL RADIOLOGY | Age: 54
DRG: 422 | End: 2019-12-19
Attending: EMERGENCY MEDICINE
Payer: MEDICAID

## 2019-12-19 ENCOUNTER — HOSPITAL ENCOUNTER (INPATIENT)
Age: 54
LOS: 1 days | Discharge: HOME | DRG: 422 | End: 2019-12-21
Attending: EMERGENCY MEDICINE | Admitting: FAMILY MEDICINE
Payer: MEDICAID

## 2019-12-19 DIAGNOSIS — R11.10 VOMITING AND DIARRHEA: ICD-10-CM

## 2019-12-19 DIAGNOSIS — N17.9 ACUTE RENAL FAILURE, UNSPECIFIED ACUTE RENAL FAILURE TYPE (HCC): Primary | ICD-10-CM

## 2019-12-19 DIAGNOSIS — R19.7 VOMITING AND DIARRHEA: ICD-10-CM

## 2019-12-19 DIAGNOSIS — E86.0 DEHYDRATION: ICD-10-CM

## 2019-12-19 LAB
ALBUMIN SERPL-MCNC: 4.2 G/DL (ref 3.4–5)
ALBUMIN/GLOB SERPL: 1.1 {RATIO} (ref 0.8–1.7)
ALP SERPL-CCNC: 206 U/L (ref 45–117)
ALT SERPL-CCNC: 22 U/L (ref 13–56)
ANION GAP SERPL CALC-SCNC: 10 MMOL/L (ref 3–18)
AST SERPL-CCNC: 24 U/L (ref 10–38)
BASOPHILS # BLD: 0 K/UL (ref 0–0.1)
BASOPHILS NFR BLD: 0 % (ref 0–2)
BILIRUB SERPL-MCNC: 0.3 MG/DL (ref 0.2–1)
BUN SERPL-MCNC: 70 MG/DL (ref 7–18)
BUN/CREAT SERPL: 17 (ref 12–20)
CALCIUM SERPL-MCNC: 8.9 MG/DL (ref 8.5–10.1)
CHLORIDE SERPL-SCNC: 102 MMOL/L (ref 100–111)
CK MB CFR SERPL CALC: 0.6 % (ref 0–4)
CK MB SERPL-MCNC: 4.6 NG/ML (ref 5–25)
CK SERPL-CCNC: 717 U/L (ref 26–192)
CO2 SERPL-SCNC: 24 MMOL/L (ref 21–32)
CREAT SERPL-MCNC: 4.06 MG/DL (ref 0.6–1.3)
DIFFERENTIAL METHOD BLD: ABNORMAL
EOSINOPHIL # BLD: 0.1 K/UL (ref 0–0.4)
EOSINOPHIL NFR BLD: 1 % (ref 0–5)
ERYTHROCYTE [DISTWIDTH] IN BLOOD BY AUTOMATED COUNT: 15.9 % (ref 11.6–14.5)
GLOBULIN SER CALC-MCNC: 4 G/DL (ref 2–4)
GLUCOSE SERPL-MCNC: 103 MG/DL (ref 74–99)
HCT VFR BLD AUTO: 39.4 % (ref 35–45)
HGB BLD-MCNC: 12.3 G/DL (ref 12–16)
LYMPHOCYTES # BLD: 2.6 K/UL (ref 0.9–3.6)
LYMPHOCYTES NFR BLD: 24 % (ref 21–52)
MCH RBC QN AUTO: 24.4 PG (ref 24–34)
MCHC RBC AUTO-ENTMCNC: 31.2 G/DL (ref 31–37)
MCV RBC AUTO: 78 FL (ref 74–97)
MONOCYTES # BLD: 0.4 K/UL (ref 0.05–1.2)
MONOCYTES NFR BLD: 4 % (ref 3–10)
NEUTS SEG # BLD: 7.6 K/UL (ref 1.8–8)
NEUTS SEG NFR BLD: 71 % (ref 40–73)
PLATELET # BLD AUTO: 392 K/UL (ref 135–420)
PMV BLD AUTO: 10.4 FL (ref 9.2–11.8)
POTASSIUM SERPL-SCNC: 5 MMOL/L (ref 3.5–5.5)
PROT SERPL-MCNC: 8.2 G/DL (ref 6.4–8.2)
RBC # BLD AUTO: 5.05 M/UL (ref 4.2–5.3)
SODIUM SERPL-SCNC: 136 MMOL/L (ref 136–145)
TROPONIN I SERPL-MCNC: <0.02 NG/ML (ref 0–0.04)
WBC # BLD AUTO: 10.8 K/UL (ref 4.6–13.2)

## 2019-12-19 PROCEDURE — 82550 ASSAY OF CK (CPK): CPT

## 2019-12-19 PROCEDURE — 85025 COMPLETE CBC W/AUTO DIFF WBC: CPT

## 2019-12-19 PROCEDURE — 99285 EMERGENCY DEPT VISIT HI MDM: CPT

## 2019-12-19 PROCEDURE — 71045 X-RAY EXAM CHEST 1 VIEW: CPT

## 2019-12-19 PROCEDURE — 73564 X-RAY EXAM KNEE 4 OR MORE: CPT

## 2019-12-19 PROCEDURE — 80053 COMPREHEN METABOLIC PANEL: CPT

## 2019-12-19 PROCEDURE — 74011250636 HC RX REV CODE- 250/636: Performed by: EMERGENCY MEDICINE

## 2019-12-19 PROCEDURE — 96360 HYDRATION IV INFUSION INIT: CPT

## 2019-12-19 PROCEDURE — 93005 ELECTROCARDIOGRAM TRACING: CPT

## 2019-12-19 RX ADMIN — SODIUM CHLORIDE 1000 ML: 900 INJECTION, SOLUTION INTRAVENOUS at 22:28

## 2019-12-20 ENCOUNTER — APPOINTMENT (OUTPATIENT)
Dept: NON INVASIVE DIAGNOSTICS | Age: 54
DRG: 422 | End: 2019-12-20
Attending: HOSPITALIST
Payer: MEDICAID

## 2019-12-20 ENCOUNTER — HOSPITAL ENCOUNTER (OUTPATIENT)
Dept: ULTRASOUND IMAGING | Age: 54
Discharge: HOME | DRG: 422 | End: 2019-12-20
Attending: HOSPITALIST
Payer: MEDICAID

## 2019-12-20 ENCOUNTER — HOSPITAL ENCOUNTER (INPATIENT)
Dept: CT IMAGING | Age: 54
Discharge: HOME | DRG: 422 | End: 2019-12-20
Attending: FAMILY MEDICINE
Payer: MEDICAID

## 2019-12-20 PROBLEM — R19.7 VOMITING AND DIARRHEA: Status: ACTIVE | Noted: 2019-12-20

## 2019-12-20 PROBLEM — E86.0 DEHYDRATION: Status: ACTIVE | Noted: 2019-12-20

## 2019-12-20 PROBLEM — N17.9 ARF (ACUTE RENAL FAILURE) (HCC): Status: ACTIVE | Noted: 2019-12-20

## 2019-12-20 PROBLEM — R11.10 VOMITING AND DIARRHEA: Status: ACTIVE | Noted: 2019-12-20

## 2019-12-20 LAB
ANION GAP SERPL CALC-SCNC: 7 MMOL/L (ref 3–18)
ATRIAL RATE: 94 BPM
BUN SERPL-MCNC: 51 MG/DL (ref 7–18)
BUN/CREAT SERPL: 29 (ref 12–20)
CALCIUM SERPL-MCNC: 7.1 MG/DL (ref 8.5–10.1)
CALCULATED P AXIS, ECG09: 67 DEGREES
CALCULATED R AXIS, ECG10: 21 DEGREES
CALCULATED T AXIS, ECG11: 39 DEGREES
CHLORIDE SERPL-SCNC: 116 MMOL/L (ref 100–111)
CO2 SERPL-SCNC: 21 MMOL/L (ref 21–32)
CREAT SERPL-MCNC: 1.75 MG/DL (ref 0.6–1.3)
DIAGNOSIS, 93000: NORMAL
ERYTHROCYTE [DISTWIDTH] IN BLOOD BY AUTOMATED COUNT: 16 % (ref 11.6–14.5)
EST. AVERAGE GLUCOSE BLD GHB EST-MCNC: 146 MG/DL
GLUCOSE BLD STRIP.AUTO-MCNC: 103 MG/DL (ref 70–110)
GLUCOSE BLD STRIP.AUTO-MCNC: 118 MG/DL (ref 70–110)
GLUCOSE BLD STRIP.AUTO-MCNC: 94 MG/DL (ref 70–110)
GLUCOSE SERPL-MCNC: 91 MG/DL (ref 74–99)
HBA1C MFR BLD: 6.7 % (ref 4.2–5.6)
HCT VFR BLD AUTO: 31.3 % (ref 35–45)
HGB BLD-MCNC: 9.7 G/DL (ref 12–16)
MAGNESIUM SERPL-MCNC: 2.4 MG/DL (ref 1.6–2.6)
MCH RBC QN AUTO: 24.1 PG (ref 24–34)
MCHC RBC AUTO-ENTMCNC: 31 G/DL (ref 31–37)
MCV RBC AUTO: 77.9 FL (ref 74–97)
P-R INTERVAL, ECG05: 176 MS
PLATELET # BLD AUTO: 316 K/UL (ref 135–420)
PMV BLD AUTO: 9.9 FL (ref 9.2–11.8)
POTASSIUM SERPL-SCNC: 3.5 MMOL/L (ref 3.5–5.5)
Q-T INTERVAL, ECG07: 374 MS
QRS DURATION, ECG06: 90 MS
QTC CALCULATION (BEZET), ECG08: 467 MS
RBC # BLD AUTO: 4.02 M/UL (ref 4.2–5.3)
SODIUM SERPL-SCNC: 144 MMOL/L (ref 136–145)
TSH SERPL DL<=0.05 MIU/L-ACNC: 0.58 UIU/ML (ref 0.36–3.74)
VENTRICULAR RATE, ECG03: 94 BPM
WBC # BLD AUTO: 8.9 K/UL (ref 4.6–13.2)

## 2019-12-20 PROCEDURE — 36415 COLL VENOUS BLD VENIPUNCTURE: CPT

## 2019-12-20 PROCEDURE — 82962 GLUCOSE BLOOD TEST: CPT

## 2019-12-20 PROCEDURE — 74011250636 HC RX REV CODE- 250/636: Performed by: FAMILY MEDICINE

## 2019-12-20 PROCEDURE — 84443 ASSAY THYROID STIM HORMONE: CPT

## 2019-12-20 PROCEDURE — 83735 ASSAY OF MAGNESIUM: CPT

## 2019-12-20 PROCEDURE — 93306 TTE W/DOPPLER COMPLETE: CPT

## 2019-12-20 PROCEDURE — 65660000000 HC RM CCU STEPDOWN

## 2019-12-20 PROCEDURE — 76770 US EXAM ABDO BACK WALL COMP: CPT

## 2019-12-20 PROCEDURE — 74011250637 HC RX REV CODE- 250/637: Performed by: FAMILY MEDICINE

## 2019-12-20 PROCEDURE — 74011250636 HC RX REV CODE- 250/636: Performed by: EMERGENCY MEDICINE

## 2019-12-20 PROCEDURE — 85027 COMPLETE CBC AUTOMATED: CPT

## 2019-12-20 PROCEDURE — 83036 HEMOGLOBIN GLYCOSYLATED A1C: CPT

## 2019-12-20 PROCEDURE — 80048 BASIC METABOLIC PNL TOTAL CA: CPT

## 2019-12-20 PROCEDURE — 70450 CT HEAD/BRAIN W/O DYE: CPT

## 2019-12-20 RX ORDER — HEPARIN SODIUM 5000 [USP'U]/ML
5000 INJECTION, SOLUTION INTRAVENOUS; SUBCUTANEOUS EVERY 8 HOURS
Status: DISCONTINUED | OUTPATIENT
Start: 2019-12-20 | End: 2019-12-21 | Stop reason: HOSPADM

## 2019-12-20 RX ORDER — ACETAMINOPHEN 325 MG/1
650 TABLET ORAL
Status: DISCONTINUED | OUTPATIENT
Start: 2019-12-20 | End: 2019-12-21 | Stop reason: HOSPADM

## 2019-12-20 RX ORDER — ALPRAZOLAM 0.5 MG/1
1 TABLET ORAL
Status: DISCONTINUED | OUTPATIENT
Start: 2019-12-20 | End: 2019-12-21 | Stop reason: HOSPADM

## 2019-12-20 RX ORDER — QUETIAPINE 300 MG/1
300 TABLET, FILM COATED, EXTENDED RELEASE ORAL
COMMUNITY
End: 2019-12-21

## 2019-12-20 RX ORDER — MINOXIDIL 2.5 MG/1
2.5 TABLET ORAL DAILY
COMMUNITY
End: 2019-12-21

## 2019-12-20 RX ORDER — ZOLPIDEM TARTRATE 10 MG/1
10 TABLET ORAL
COMMUNITY

## 2019-12-20 RX ORDER — QUETIAPINE 300 MG/1
300 TABLET, FILM COATED, EXTENDED RELEASE ORAL
Status: DISCONTINUED | OUTPATIENT
Start: 2019-12-20 | End: 2019-12-20 | Stop reason: CLARIF

## 2019-12-20 RX ORDER — SODIUM CHLORIDE 9 MG/ML
100 INJECTION, SOLUTION INTRAVENOUS CONTINUOUS
Status: DISCONTINUED | OUTPATIENT
Start: 2019-12-20 | End: 2019-12-21 | Stop reason: HOSPADM

## 2019-12-20 RX ORDER — ZOLPIDEM TARTRATE 5 MG/1
10 TABLET ORAL
Status: DISCONTINUED | OUTPATIENT
Start: 2019-12-20 | End: 2019-12-21 | Stop reason: HOSPADM

## 2019-12-20 RX ORDER — QUETIAPINE FUMARATE 100 MG/1
350 TABLET, FILM COATED ORAL
Status: DISCONTINUED | OUTPATIENT
Start: 2019-12-20 | End: 2019-12-21 | Stop reason: HOSPADM

## 2019-12-20 RX ORDER — QUETIAPINE FUMARATE 50 MG/1
50 TABLET, EXTENDED RELEASE ORAL DAILY
COMMUNITY

## 2019-12-20 RX ORDER — MINOXIDIL 2.5 MG/1
2.5 TABLET ORAL DAILY
Status: DISCONTINUED | OUTPATIENT
Start: 2019-12-20 | End: 2019-12-21 | Stop reason: HOSPADM

## 2019-12-20 RX ORDER — MAGNESIUM SULFATE 100 %
4 CRYSTALS MISCELLANEOUS AS NEEDED
Status: DISCONTINUED | OUTPATIENT
Start: 2019-12-20 | End: 2019-12-21 | Stop reason: HOSPADM

## 2019-12-20 RX ORDER — SODIUM CHLORIDE 0.9 % (FLUSH) 0.9 %
5-40 SYRINGE (ML) INJECTION AS NEEDED
Status: DISCONTINUED | OUTPATIENT
Start: 2019-12-20 | End: 2019-12-21 | Stop reason: HOSPADM

## 2019-12-20 RX ORDER — AMLODIPINE BESYLATE 5 MG/1
5 TABLET ORAL DAILY
Status: DISCONTINUED | OUTPATIENT
Start: 2019-12-20 | End: 2019-12-21 | Stop reason: HOSPADM

## 2019-12-20 RX ORDER — QUETIAPINE FUMARATE 50 MG/1
50 TABLET, EXTENDED RELEASE ORAL DAILY
Status: DISCONTINUED | OUTPATIENT
Start: 2019-12-20 | End: 2019-12-20 | Stop reason: CLARIF

## 2019-12-20 RX ORDER — LISINOPRIL 20 MG/1
20 TABLET ORAL DAILY
Status: DISCONTINUED | OUTPATIENT
Start: 2019-12-20 | End: 2019-12-20

## 2019-12-20 RX ORDER — SODIUM CHLORIDE 0.9 % (FLUSH) 0.9 %
5-40 SYRINGE (ML) INJECTION EVERY 8 HOURS
Status: DISCONTINUED | OUTPATIENT
Start: 2019-12-20 | End: 2019-12-21 | Stop reason: HOSPADM

## 2019-12-20 RX ORDER — INSULIN LISPRO 100 [IU]/ML
INJECTION, SOLUTION INTRAVENOUS; SUBCUTANEOUS
Status: DISCONTINUED | OUTPATIENT
Start: 2019-12-20 | End: 2019-12-21 | Stop reason: HOSPADM

## 2019-12-20 RX ADMIN — ZOLPIDEM TARTRATE 10 MG: 5 TABLET ORAL at 22:11

## 2019-12-20 RX ADMIN — HEPARIN SODIUM 5000 UNITS: 5000 INJECTION INTRAVENOUS; SUBCUTANEOUS at 12:59

## 2019-12-20 RX ADMIN — HEPARIN SODIUM 5000 UNITS: 5000 INJECTION INTRAVENOUS; SUBCUTANEOUS at 22:12

## 2019-12-20 RX ADMIN — HEPARIN SODIUM 5000 UNITS: 5000 INJECTION INTRAVENOUS; SUBCUTANEOUS at 05:46

## 2019-12-20 RX ADMIN — ALPRAZOLAM 1 MG: 0.5 TABLET ORAL at 12:59

## 2019-12-20 RX ADMIN — QUETIAPINE FUMARATE 350 MG: 100 TABLET ORAL at 22:12

## 2019-12-20 RX ADMIN — SODIUM CHLORIDE 200 ML/HR: 900 INJECTION, SOLUTION INTRAVENOUS at 02:46

## 2019-12-20 RX ADMIN — SODIUM CHLORIDE 200 ML/HR: 900 INJECTION, SOLUTION INTRAVENOUS at 00:57

## 2019-12-20 RX ADMIN — Medication 10 ML: at 13:00

## 2019-12-20 RX ADMIN — ACETAMINOPHEN 650 MG: 325 TABLET ORAL at 08:16

## 2019-12-20 NOTE — ROUTINE PROCESS
TRANSFER - OUT REPORT:    Verbal report given to Linus Vaughn RN(name) on [address] [place] [address]  being transferred to Tele(unit) for routine progression of care       Report consisted of patients Situation, Background, Assessment and   Recommendations(SBAR). Information from the following report(s) SBAR, ED Summary, Procedure Summary, Intake/Output, MAR, Recent Results and Cardiac Rhythm sinus tach was reviewed with the receiving nurse. Lines:   Peripheral IV 12/19/19 Left Antecubital (Active)   Site Assessment Clean, dry, & intact 12/19/2019  9:30 PM   Phlebitis Assessment 0 12/19/2019  9:30 PM   Infiltration Assessment 0 12/19/2019  9:30 PM   Dressing Status Clean, dry, & intact 12/19/2019  9:30 PM   Dressing Type Tape;Transparent 12/19/2019  9:30 PM   Hub Color/Line Status Pink;Flushed;Patent 12/19/2019  9:30 PM   Action Taken Blood drawn 12/19/2019  9:30 PM        Opportunity for questions and clarification was provided. Pt will have CT before transport to Unit.   Patient transported with:   Monitor  Registered Nurse

## 2019-12-20 NOTE — PROGRESS NOTES
0730 Assumed care of the patient from [address] (offgoing Nurse). Patient is alert and oriented. Pt denies any pain or discomfort at this moment. bed in low position, call bell within reach. 1900 Patient had an uneventful shift and remained stable. Purposeful hourly rounding completed throughout the shift, NAD noted at this time, and patient is resting quietly in bed. No concerns or requests voiced    1940 Bedside and Verbal shift change report given to Uma Hurley RN(oncoming nurse) by Jodi Way RN (offgoing nurse). Report included the following information SBAR, Kardex, Intake/Output, MAR and Cardiac Rhythm .

## 2019-12-20 NOTE — PROGRESS NOTES
Reason for Admission:   Sana Schmitt is a 48 y.o. female with past medical history of diabetes, hypertension, psychiatric disorder not otherwise specified the patient states his bipolar disorder, restless leg syndrome, and hyperlipidemia presents via EMS to the ED c/o lightheadedness with standing and an episode of syncope x just PTA in the ED. Pt notes associated symptoms of blurry vision, and recent diarrhea prior to starting a 3 day [employer] fast. Pt states that she has been religiously fasting (not eating) Monday, Tuesday, and Wednesday (last 3 days). Pt has still been drinking water and taking her medications. Her blood sugar was in the 130s en route to the ED. Pt takes Metformin only for DM. Pt's PCP has recently lowered her blood pressure medications, due to low blood pressures in the office. Pt denies any history of strokes or MI. Pt has a FHx of HTN and DM. Pt does not regularly take cough or cold medicines. Pt was treated for PNA this summer, but has not had any reoccurrence. Pt also notes bilateral knee pain with acute on chronic exacerbation of baseline arthritis s/p fall. RRAT Score:          9 low         Plan for utilizing home health: declines                       Current Advanced Directive/Advance Care Plan: please place consult to palliative care and or pastoral care to address acp                         Transition of Care Plan:     Met with patient at bedside. Patient reports she lives with her son. Reports she is IADL's. Reports she has medicaid for insurance has Dr Rickie Castro for pcp. Reports her son will drive her home. Patient asked for sleeping medication. States she has been awake all night. Cm diverted this to Hardy Carey RN. Gordon Gonzalez Patient denies she uses any DME or has the need. States she does have a cane but does not really need it. Cm will continue to follow  Care Management Interventions  PCP Verified by CM:  Yes  Transition of Care Consult (CM Consult): Discharge Planning  Current Support Network: Relative's Home  Confirm Follow Up Transport: Family  The Plan for Transition of Care is Related to the Following Treatment Goals : home when medically cleared  The Patient and/or Patient Representative was Provided with a Choice of Provider and Agrees with the Discharge Plan?: Yes  Freedom of Choice List was Provided with Basic Dialogue that Supports the Patient's Individualized Plan of Care/Goals, Treatment Preferences and Shares the Quality Data Associated with the Providers?: Yes  Discharge Location  Discharge Placement: Home with family assistance

## 2019-12-20 NOTE — CONSULTS
Problem List    1. TIKA   2. Dehydration   3. Fasting  4. Morbid obesity  5. Ho knee surgery         Medical Decision Making / José Miguel Hunt is a 48 y.o. female with medical problems as list above.  Presenting with TIKA    Plan    · Resolving with hydration  · Cont for 24 hours   · Cousneled to prevent reoccurence   · Avoid Nephrotoxins  · Avoid NSAIDs  · Avoid contrast dye studies unless absolutely necessary   · Low potassium renal diet   · Dose all medications for creatinine clearance  · Daily renal panel      History of the present illness:     Mercedes Butler is a 48 y.o. female   Fasting  For healthreasons and other life reasons  Thinks she was drinking enough liquids  She was counseled not to dehydrate self      ROS:  12 Systems reviewed and are negative other than per HPI    Past Medical History :  Past Medical History:   Diagnosis Date    Anxiety     Arthritis     Cancer ([facility] [address]     endometrial     Chronic pain     legs knees headaches    Coagulation defects     anemia    Depression     HX OTHER MEDICAL     muscle cramping    Hypertension     Other ill-defined conditions(799.89)     muscle spasms in legs    Unspecified sleep apnea     in the past had gastric bypass       Past Surgical History :  Past Surgical History:   Procedure Laterality Date    HX CHOLECYSTECTOMY      HX GASTRIC BYPASS  2005    HX HYSTERECTOMY      HX KNEE REPLACEMENT      HX ORTHOPAEDIC      LCTR       Social History :  Social History     Socioeconomic History    Marital status: SINGLE     Spouse name: Not on file    Number of children: Not on file    Years of education: Not on file    Highest education level: Not on file   Tobacco Use    Smoking status: Never Smoker    Smokeless tobacco: Never Used   Substance and Sexual Activity    Alcohol use: No     Alcohol/week: 0.0 standard drinks    Drug use: No       Family History :  Family History   Problem Relation Age of Onset    Malignant Hyperthermia Neg Hx     Pseudocholinesterase Deficiency Neg Hx     Delayed Awakening Neg Hx     Post-op Nausea/Vomiting Neg Hx     Emergence Delirium Neg Hx     Post-op Cognitive Dysfunction Neg Hx     Other Neg Hx        Allergy :  Allergies   Allergen Reactions    Percocet [Oxycodone-Acetaminophen] Itching       Medications :   Current Medications  :     Current Facility-Administered Medications   Medication Dose Route Frequency Provider Last Rate Last Dose    0.9% sodium chloride infusion  100 mL/hr IntraVENous CONTINUOUS Ofelia Felipe  mL/hr at 12/20/19 0809 100 mL/hr at 12/20/19 0809    sodium chloride (NS) flush 5-40 mL  5-40 mL IntraVENous Q8H Baldomero Iyer MD   10 mL at 12/20/19 1300    sodium chloride (NS) flush 5-40 mL  5-40 mL IntraVENous PRN Baldomero Iyer MD        acetaminophen (TYLENOL) tablet 650 mg  650 mg Oral Q4H PRN Baldomero Iyer MD   650 mg at 12/20/19 0816    heparin (porcine) injection 5,000 Units  5,000 Units SubCUTAneous Q8H Baldomero Iyer MD   5,000 Units at 12/20/19 1259    ALPRAZolam (XANAX) tablet 1 mg  1 mg Oral TID PRN Baldomero Iyer MD   1 mg at 12/20/19 1259    [Held by provider] amLODIPine (NORVASC) tablet 5 mg  5 mg Oral DAILY Baldomero yIer MD        [Held by provider] minoxidil (LONITEN) tablet 2.5 mg  2.5 mg Oral DAILY Baldomero Iyer MD        zolpidem (AMBIEN) tablet 10 mg  10 mg Oral QHS PRN Baldomero Iyer MD        QUEtiapine (SEROquel) tablet 350 mg  350 mg Oral QHS Baldomero Iyer MD        insulin lispro (HUMALOG) injection   SubCUTAneous AC&HS Ofelia Felipe MD   Stopped at 12/20/19 1130    glucose chewable tablet 16 g  4 Tab Oral PRN Ofelia Felipe MD        glucagon (GLUCAGEN) injection 1 mg  1 mg IntraMUSCular PRN Ofelia Felipe MD                 Physical examination :    Visit Vitals  /59   Pulse 90   Temp 98 °F (36.7 °C)   Resp 18   Ht 5' 4\" (1.626 m)   Wt 104.3 kg (230 lb)   SpO2 100%   Breastfeeding No   BMI 39.48 kg/m²         · General : No apparent distress. · HEENT : Normocephalic and atraumatic  · Lungs : Clear to auscultation bilaterally  · Cardiovascular : Regular rate and rhythm, no rubs or gallops  · Abdomen : Soft , nontender, nondistended with positive bowel sounds.   · Extremities : No lower extremity edema  · Neurology : Nonfocal.   · Skin : Warm and dry    Laboratory and imaging data:     Pertinent laboratory testing and imaging data reviewed    Checklist    Code status :  Full Code      Diet :    DIET DIABETIC CONSISTENT CARB Regular    Treatment Team :  Treatment Team: Attending Provider: Link Love MD; Consulting Provider: Link Love MD; Consulting Provider: Crhistiano Euceda MD; Care Manager: Rebekah Zhang RN; Consulting Provider: Yenni Mcduffie MD   Cell Phone : [phone]

## 2019-12-20 NOTE — ED TRIAGE NOTES
Ptarrives c/o lightheadedness after fasting M,T,W. Pt states that she has been eating today but is still lightheaded. She has hx DM and HTN. Pt took HTN meds throughout fast but not DM meds. She states that she had a fall today with unknown LOC. EMS states that she was ambulatory on scene w/o difficulty but posed to have difficulty walking when she arrived.

## 2019-12-20 NOTE — H&P
History & Physical    Patient: Sebastien Johnson MRN: [id number]  CSN: [id number]    YOB: 1965  Age: 48 y.o. Sex: female      DOA: 12/19/2019  Primary Care Provider:  Addie Saunders NP      Assessment/Plan   30-year-old with past medical history of diabetes, hypertension, psychiatric disorder not otherwise specified per patient states she has bipolar disorder    Syncope  CT brain unremarkable  Likely secondary to hypovolemic state  Also likely related to acute renal failure  Cardiac enzymes unremarkable    Acute renal failure  Last known creatinine is 0.97 in August of this year, today her creatinine is 4.06  Rehydrate patient, given 1 L in ED we will continue to rehydrate  Repeat BMP in a.m.  ED physician consulted nephrologist, Dr. Kingsley Donohue    Diabetes mellitus  Sliding scale insulin, ADA diet, fingerstick blood glucose q. before meals and q. at bedtime  Hold metformin    Hypertension -controlled  Low blood pressure numbers documented in chart, but likely due to wrong size cuff   Once appropriate size cuff placed on patient, her blood pressures became reasonable    Psychiatric disorder not otherwise specified  Resume home medication regimen of Seroquel and Xanax    Restless leg syndrome  We will continue to monitor    Hyperlipidemia  We will check lipids    DVT/GI prophylaxis ordered    Patient Active Problem List   Diagnosis Code    [facility] M33.20    S/P total knee arthroplasty Z96.659    Surgery, elective Z41.9       Estimated length of stay : 2-3 days    CC: Syncope       HPI:     Sebastien Johnson is a 48 y.o. female with past medical history of diabetes, hypertension, psychiatric disorder not otherwise specified the patient states his bipolar disorder, restless leg syndrome, and hyperlipidemia presents via EMS to the ED c/o lightheadedness with standing and an episode of syncope x just PTA in the ED.  Pt notes associated symptoms of blurry vision, and recent diarrhea prior to starting a 3 day [employer] fast. Pt states that she has been religiously fasting (not eating) Monday, Tuesday, and Wednesday (last 3 days). Pt has still been drinking water and taking her medications. Her blood sugar was in the 130s en route to the ED. Pt takes Metformin only for DM. Pt's PCP has recently lowered her blood pressure medications, due to low blood pressures in the office. Pt denies any history of strokes or MI. Pt has a FHx of HTN and DM. Pt does not regularly take cough or cold medicines. Pt was treated for PNA this summer, but has not had any reoccurrence. Pt also notes bilateral knee pain with acute on chronic exacerbation of baseline arthritis s/p fall.     Past Medical History:   Diagnosis Date    Anxiety     Arthritis     Cancer ([facility] [address]     endometrial     Chronic pain     legs knees headaches    Coagulation defects     anemia    Depression     HX OTHER MEDICAL     muscle cramping    Hypertension     Other ill-defined conditions(799.89)     muscle spasms in legs    Unspecified sleep apnea     in the past had gastric bypass       Past Surgical History:   Procedure Laterality Date    HX CHOLECYSTECTOMY      HX GASTRIC BYPASS  2005    HX HYSTERECTOMY      HX KNEE REPLACEMENT      HX ORTHOPAEDIC      LCTR       Family History   Problem Relation Age of Onset    Malignant Hyperthermia Neg Hx     Pseudocholinesterase Deficiency Neg Hx     Delayed Awakening Neg Hx     Post-op Nausea/Vomiting Neg Hx     Emergence Delirium Neg Hx     Post-op Cognitive Dysfunction Neg Hx     Other Neg Hx        Social History     Socioeconomic History    Marital status: SINGLE     Spouse name: Not on file    Number of children: Not on file    Years of education: Not on file    Highest education level: Not on file   Tobacco Use    Smoking status: Never Smoker    Smokeless tobacco: Never Used   Substance and Sexual Activity    Alcohol use: No     Alcohol/week: 0.0 standard drinks    Drug use: No       Prior to Admission medications    Medication Sig Start Date End Date Taking? Authorizing Provider   ALPRAZolam Trenton Vuong) 1 mg tablet Take 1 mg by mouth three (3) times daily as needed for Anxiety. Molina Alberto MD   amLODIPine (NORVASC) 10 mg tablet Take 10 mg by mouth daily. 18   Molina Alberto MD   dilTIAZem CD (CARDIZEM CD) 240 mg ER capsule Take 240 mg by mouth daily. 17   Molina Alberto MD   ibuprofen (MOTRIN) 600 mg tablet Take 1 Tab by mouth every six (6) hours as needed for Pain. 10/7/16   Zackary Salinas MD   traMADol Suzzane Chema) 50 mg tablet Take 1 Tab by mouth every eight (8) hours as needed for Pain (for severe pain, caution can cause drowsiness; avoid with vicodin). Max Daily Amount: 150 mg. 9/22/15   Meredith Oppenheim I, MD   lisinopril (PRINIVIL, ZESTRIL) 20 mg tablet Take 20 mg by mouth daily. Indications: HYPERTENSION    Molina Alberto MD       Allergies   Allergen Reactions    Percocet [Oxycodone-Acetaminophen] Itching       Review of Systems  Gen: No fever, chills, malaise, weight loss/gain. Heent: No headache, rhinorrhea, epistaxis, ear pain, hearing loss, sinus pain, neck pain/stiffness, sore throat. Heart: No chest pain, palpitations, STRONG, pnd, or orthopnea. Resp: No cough, hemoptysis, wheezing and shortness of breath. GI: No nausea, vomiting, diarrhea, constipation, melena or hematochezia. : No urinary obstruction, dysuria or hematuria. Derm: No rash, new skin lesion or pruritis. Musc/skeletal: no bone or joint complains. Vasc: No edema, cyanosis or claudication. Endo: No heat/cold intolerance, no polyuria,polydipsia or polyphagia. Neuro: No unilateral weakness, numbness, tingling. No seizures. Heme: No easy bruising or bleeding.           Physical Exam:     Physical Exam:  Visit Vitals  /60   Pulse 100   Temp 98.2 °F (36.8 °C)   Resp 16   SpO2 100%      O2 Device: Room air    Temp (24hrs), Av.2 °F (36.8 °C), Min:98.2 °F (36.8 °C), Max:98.2 °F (36.8 °C)    No intake/output data recorded. No intake/output data recorded. General:  Awake, cooperative, no distress. Head:  Normocephalic, without obvious abnormality, atraumatic. Eyes:  Conjunctivae/corneas clear, sclera anicteric, PERRL, EOMs intact. Nose: Nares normal. No drainage or sinus tenderness. Throat: Lips, mucosa, and tongue normal.    Neck: Supple, symmetrical, trachea midline, no adenopathy. Lungs:   Clear to auscultation bilaterally. Heart:  Regular rate and rhythm, S1, S2 normal, no murmur, click, rub or gallop. Abdomen: Soft, non-tender. Bowel sounds normal. No masses,  No organomegaly. Extremities: Extremities normal, atraumatic, no cyanosis or edema. Capillary refill normal.   Pulses: 2+ and symmetric all extremities. Skin: Skin color appropriate for ethnicity, turgor normal. No rashes or lesions   Neurologic: CNII-XII intact. No focal motor or sensory deficit. Labs Reviewed:  Recent Results (from the past 24 hour(s))   CARDIAC PANEL,(CK, CKMB & TROPONIN)    Collection Time: 12/19/19  9:30 PM   Result Value Ref Range     (H) 26 - 192 U/L    CK - MB 4.6 (H) <3.6 ng/ml    CK-MB Index 0.6 0.0 - 4.0 %    Troponin-I, QT <0.02 0.0 - 0.045 NG/ML   CBC WITH AUTOMATED DIFF    Collection Time: 12/19/19  9:30 PM   Result Value Ref Range    WBC 10.8 4.6 - 13.2 K/uL    RBC 5.05 4.20 - 5.30 M/uL    HGB 12.3 12.0 - 16.0 g/dL    HCT 39.4 35.0 - 45.0 %    MCV 78.0 74.0 - 97.0 FL    MCH 24.4 24.0 - 34.0 PG    MCHC 31.2 31.0 - 37.0 g/dL    RDW 15.9 (H) 11.6 - 14.5 %    PLATELET 352 727 - 802 K/uL    MPV 10.4 9.2 - 11.8 FL    NEUTROPHILS 71 40 - 73 %    LYMPHOCYTES 24 21 - 52 %    MONOCYTES 4 3 - 10 %    EOSINOPHILS 1 0 - 5 %    BASOPHILS 0 0 - 2 %    ABS. NEUTROPHILS 7.6 1.8 - 8.0 K/UL    ABS. LYMPHOCYTES 2.6 0.9 - 3.6 K/UL    ABS. MONOCYTES 0.4 0.05 - 1.2 K/UL    ABS. EOSINOPHILS 0.1 0.0 - 0.4 K/UL    ABS.  BASOPHILS 0.0 0.0 - 0.1 K/UL    DF AUTOMATED     METABOLIC PANEL, COMPREHENSIVE    Collection Time: 12/19/19  9:30 PM   Result Value Ref Range    Sodium 136 136 - 145 mmol/L    Potassium 5.0 3.5 - 5.5 mmol/L    Chloride 102 100 - 111 mmol/L    CO2 24 21 - 32 mmol/L    Anion gap 10 3.0 - 18 mmol/L    Glucose 103 (H) 74 - 99 mg/dL    BUN 70 (H) 7.0 - 18 MG/DL    Creatinine 4.06 (H) 0.6 - 1.3 MG/DL    BUN/Creatinine ratio 17 12 - 20      GFR est AA 14 (L) >60 ml/min/1.73m2    GFR est non-AA 12 (L) >60 ml/min/1.73m2    Calcium 8.9 8.5 - 10.1 MG/DL    Bilirubin, total 0.3 0.2 - 1.0 MG/DL    ALT (SGPT) 22 13 - 56 U/L    AST (SGOT) 24 10 - 38 U/L    Alk. phosphatase 206 (H) 45 - 117 U/L    Protein, total 8.2 6.4 - 8.2 g/dL    Albumin 4.2 3.4 - 5.0 g/dL    Globulin 4.0 2.0 - 4.0 g/dL    A-G Ratio 1.1 0.8 - 1.7     EKG, 12 LEAD, INITIAL    Collection Time: 12/19/19 10:34 PM   Result Value Ref Range    Ventricular Rate 94 BPM    Atrial Rate 94 BPM    P-R Interval 176 ms    QRS Duration 90 ms    Q-T Interval 374 ms    QTC Calculation (Bezet) 467 ms    Calculated P Axis 67 degrees    Calculated R Axis 21 degrees    Calculated T Axis 39 degrees    Diagnosis       Normal sinus rhythm  Minimal voltage criteria for LVH, may be normal variant  Cannot rule out Anterior infarct (cited on or before 20-AUG-2019)  Abnormal ECG  When compared with ECG of 20-AUG-2019 17:32,  No significant change was found         Procedures/imaging: see electronic medical records for all procedures/Xrays and details which were not copied into this note but were reviewed prior to creation of Plan.      CC: Aurea Caldwell NP

## 2019-12-20 NOTE — ED PROVIDER NOTES
EMERGENCY DEPARTMENT HISTORY AND PHYSICAL EXAM    Date: 12/19/2019  Patient Name: Brayan Bennett    History of Presenting Illness     Chief Complaint   Patient presents with   [facility] Fall    Dizziness         History Provided By: Patient    Additional History (Context):     8:50 PM    Brayan Bennett is a 48 y.o. female with pertinent PMHx of anxiety, HTN, CA, anemia, chronic pain, and NIDDM presenting via EMS to the ED c/o lightheadedness with standing and an episode of syncope x just PTA in the ED. Pt notes associated symptoms of blurry vision, clear urine, and recent diarrhea. Pt states that she has been religiously fasting (not eating) Monday, Tuesday, and Wednesday (last 3 days). Pt has still been drinking water and taking her medications. Her blood sugar was in the 130s en route to the ED. Pt takes Metformin only for DM. Pt's PCP has recently lowered her blood pressure medications, due to low blood pressures in the office. Pt denies any history of strokes or MI. Pt has a FHx of HTN and DM. Pt does not regularly take cough or cold medicines. Pt was treated for PNA this summer, but has not had any reoccurrence. Pt also notes bilateral knee pain with acute on chronic exacerbation of baseline arthritis s/p fall. PCP: Nixon Miranda NP  Pt does not smoke tobacco, drink EtOH excessively, or do illicit drugs. There are no other complaints, changes, or physical findings at this time. Current Outpatient Medications   Medication Sig Dispense Refill    ALPRAZolam (XANAX) 1 mg tablet Take 1 mg by mouth three (3) times daily as needed for Anxiety.  amLODIPine (NORVASC) 10 mg tablet Take 10 mg by mouth daily. 3    dilTIAZem CD (CARDIZEM CD) 240 mg ER capsule Take 240 mg by mouth daily.  ibuprofen (MOTRIN) 600 mg tablet Take 1 Tab by mouth every six (6) hours as needed for Pain.  20 Tab 0    traMADol (ULTRAM) 50 mg tablet Take 1 Tab by mouth every eight (8) hours as needed for Pain (for severe pain, caution can cause drowsiness; avoid with vicodin). Max Daily Amount: 150 mg. 10 Tab 0    lisinopril (PRINIVIL, ZESTRIL) 20 mg tablet Take 20 mg by mouth daily. Indications: HYPERTENSION         Past History     Past Medical History:  Past Medical History:   Diagnosis Date    Anxiety     Arthritis     Cancer ([facility]     endometrial     Chronic pain     legs knees headaches    Coagulation defects     anemia    Depression     HX OTHER MEDICAL     muscle cramping    Hypertension     Other ill-defined conditions(799.89)     muscle spasms in legs    Unspecified sleep apnea     in the past had gastric bypass       Past Surgical History:  Past Surgical History:   Procedure Laterality Date    HX CHOLECYSTECTOMY      HX GASTRIC BYPASS  2005    HX HYSTERECTOMY      HX KNEE REPLACEMENT      HX ORTHOPAEDIC      LCTR       Family History:  Family History   Problem Relation Age of Onset    Malignant Hyperthermia Neg Hx     Pseudocholinesterase Deficiency Neg Hx     Delayed Awakening Neg Hx     Post-op Nausea/Vomiting Neg Hx     Emergence Delirium Neg Hx     Post-op Cognitive Dysfunction Neg Hx     Other Neg Hx        Social History:  Social History     Tobacco Use    Smoking status: Never Smoker    Smokeless tobacco: Never Used   Substance Use Topics    Alcohol use: No     Alcohol/week: 0.0 standard drinks    Drug use: No       Allergies: Allergies   Allergen Reactions    Percocet [Oxycodone-Acetaminophen] Itching         Review of Systems   Review of Systems   Constitutional: Negative for chills and fever. Eyes: Positive for visual disturbance. Gastrointestinal: Positive for diarrhea. Negative for nausea and vomiting. Genitourinary:        Positive for clear urine   Musculoskeletal: Positive for arthralgias (bilateral knees). Neurological: Positive for syncope and light-headedness. All other systems reviewed and are negative.       Physical Exam     Vitals:    12/19/19 2145 12/19/19 2200 12/19/19 2245 12/19/19 2300   BP: 100/60  106/58 106/60   Pulse: 93 95 95 100   Resp: 14 16 26 16   Temp:       SpO2: 100% 98% 99% 100%     Physical Exam  Vitals signs and nursing note reviewed. Constitutional:       General: She is not in acute distress. Appearance: She is well-developed. She is not diaphoretic. HENT:      Head: Normocephalic and atraumatic. Right Ear: External ear normal.      Left Ear: External ear normal.      Mouth/Throat:      Comments: Mild pallor to the tongue  Eyes:      Pupils: Pupils are equal, round, and reactive to light. Comments: Mild pallor to the eyes   Neck:      Musculoskeletal: Normal range of motion and neck supple. Thyroid: No thyromegaly. Vascular: No JVD. Trachea: No tracheal deviation. Cardiovascular:      Rate and Rhythm: Normal rate and regular rhythm. Heart sounds: Normal heart sounds. Pulmonary:      Effort: Pulmonary effort is normal. No respiratory distress. Breath sounds: Normal breath sounds. No stridor. Abdominal:      General: Bowel sounds are normal. There is no distension. Palpations: Abdomen is soft. Tenderness: There is no tenderness. There is no guarding or rebound. Musculoskeletal:         General: Tenderness present. Comments: Diffuse TTP to bilateral knees, without focal tenderness   Lymphadenopathy:      Cervical: No cervical adenopathy. Skin:     General: Skin is warm and dry. Findings: No erythema or rash. Comments: Not cyanotic   Neurological:      Mental Status: She is alert and oriented to person, place, and time. Cranial Nerves: No cranial nerve deficit. Coordination: Coordination normal.      Deep Tendon Reflexes: Reflexes are normal and symmetric. Reflexes normal.   Psychiatric:         Behavior: Behavior normal.         Thought Content:  Thought content normal.         Judgment: Judgment normal.         Diagnostic Study Results     Labs - Recent Results (from the past 12 hour(s))   CARDIAC PANEL,(CK, CKMB & TROPONIN)    Collection Time: 12/19/19  9:30 PM   Result Value Ref Range     (H) 26 - 192 U/L    CK - MB 4.6 (H) <3.6 ng/ml    CK-MB Index 0.6 0.0 - 4.0 %    Troponin-I, QT <0.02 0.0 - 0.045 NG/ML   CBC WITH AUTOMATED DIFF    Collection Time: 12/19/19  9:30 PM   Result Value Ref Range    WBC 10.8 4.6 - 13.2 K/uL    RBC 5.05 4.20 - 5.30 M/uL    HGB 12.3 12.0 - 16.0 g/dL    HCT 39.4 35.0 - 45.0 %    MCV 78.0 74.0 - 97.0 FL    MCH 24.4 24.0 - 34.0 PG    MCHC 31.2 31.0 - 37.0 g/dL    RDW 15.9 (H) 11.6 - 14.5 %    PLATELET 586 134 - 281 K/uL    MPV 10.4 9.2 - 11.8 FL    NEUTROPHILS 71 40 - 73 %    LYMPHOCYTES 24 21 - 52 %    MONOCYTES 4 3 - 10 %    EOSINOPHILS 1 0 - 5 %    BASOPHILS 0 0 - 2 %    ABS. NEUTROPHILS 7.6 1.8 - 8.0 K/UL    ABS. LYMPHOCYTES 2.6 0.9 - 3.6 K/UL    ABS. MONOCYTES 0.4 0.05 - 1.2 K/UL    ABS. EOSINOPHILS 0.1 0.0 - 0.4 K/UL    ABS. BASOPHILS 0.0 0.0 - 0.1 K/UL    DF AUTOMATED     METABOLIC PANEL, COMPREHENSIVE    Collection Time: 12/19/19  9:30 PM   Result Value Ref Range    Sodium 136 136 - 145 mmol/L    Potassium 5.0 3.5 - 5.5 mmol/L    Chloride 102 100 - 111 mmol/L    CO2 24 21 - 32 mmol/L    Anion gap 10 3.0 - 18 mmol/L    Glucose 103 (H) 74 - 99 mg/dL    BUN 70 (H) 7.0 - 18 MG/DL    Creatinine 4.06 (H) 0.6 - 1.3 MG/DL    BUN/Creatinine ratio 17 12 - 20      GFR est AA 14 (L) >60 ml/min/1.73m2    GFR est non-AA 12 (L) >60 ml/min/1.73m2    Calcium 8.9 8.5 - 10.1 MG/DL    Bilirubin, total 0.3 0.2 - 1.0 MG/DL    ALT (SGPT) 22 13 - 56 U/L    AST (SGOT) 24 10 - 38 U/L    Alk.  phosphatase 206 (H) 45 - 117 U/L    Protein, total 8.2 6.4 - 8.2 g/dL    Albumin 4.2 3.4 - 5.0 g/dL    Globulin 4.0 2.0 - 4.0 g/dL    A-G Ratio 1.1 0.8 - 1.7     EKG, 12 LEAD, INITIAL    Collection Time: 12/19/19 10:34 PM   Result Value Ref Range    Ventricular Rate 94 BPM    Atrial Rate 94 BPM    P-R Interval 176 ms    QRS Duration 90 ms    Q-T Interval 374 ms    QTC Calculation (Bezet) 467 ms    Calculated P Axis 67 degrees    Calculated R Axis 21 degrees    Calculated T Axis 39 degrees    Diagnosis       Normal sinus rhythm  Minimal voltage criteria for LVH, may be normal variant  Cannot rule out Anterior infarct (cited on or before 20-AUG-2019)  Abnormal ECG  When compared with ECG of 20-AUG-2019 17:32,  No significant change was found         Radiologic Studies -   XR KNEE RT MIN 4 V   Final Result   IMPRESSION: No acute fractures or subluxation of right knee. XR CHEST SNGL V   Final Result   IMPRESSION:  No acute process      XR KNEE LT MIN 4 V   Final Result   IMPRESSION: No acute fractures or subluxation of left knee. Total left knee   arthroplasty with long tibial and femoral stem components with lucency between   the bone metal interface is approximately 3 mm concerning for loosening. This is   new from December 2018. CXR Results  (Last 48 hours)               12/19/19 2230  XR CHEST SNGL V Final result    Impression:  IMPRESSION:  No acute process       Narrative:  EXAM:  AP Portable Chest X-ray 1 view        INDICATION: Fall syncope       COMPARISON: July 7, 2019       _______________       FINDINGS:  Heart and mediastinal contours are within normal limits for portable   radiograph. Lungs are clear of active disease. There are no pleural effusions. No acute osseous findings. ________________                     Medical Decision Making   I am the first provider for this patient. I reviewed the vital signs, available nursing notes, past medical history, past surgical history, family history and social history. Vital Signs-Reviewed the patient's vital signs. Pulse Oximetry Analysis - 98% on RA     EKG interpretation: (Preliminary)  NSR at 94 bpm. Borderline LVH. Subtle artifact of tremor. EKG read by Leatha Rose.  Karri Walters MD at 2234     Records Reviewed: Nursing Notes, Old Medical Records, Previous electrocardiograms and Previous Laboratory Studies    Provider Notes (Medical Decision Making): Dizziness with hypotension is likely not DM or blood sugar related, though this must be evaluated. Must consider hypovolemia due to diarrhea and changes in urine. Will follow up on cardiac monitor, perform XR, and look for ACS. PE unlikely. CHF, anemia, and stroke are also all unlikely. Will provide fluids. PROCEDURES:  Procedures    MEDICATIONS GIVEN IN THE ED:  Medications   sodium chloride 0.9 % bolus infusion 1,000 mL (1,000 mL IntraVENous New Bag 12/19/19 2228)        ED COURSE:   8:50 PM   Initial assessment performed. CONSULT NOTE:  11:47 PM  Yung Sage MD spoke with Anika Zimmer MD,  Specialty: Nephrology  Discussed pt's hx, disposition, and available diagnostic and imaging results. Reviewed care plans. Consultant agrees with plans as outlined and will consult for the pt. He has no further recommendations. Written by Cristiana Arroyo, ED Scribe, as dictated by Diamond Sage MD.    CONSULT NOTE:   11:58 PM  Yung Sage MD spoke with Dr. Randall Wilson,   Specialty: Hospitalist  Discussed pt's hx, disposition, and available diagnostic and imaging results. Reviewed care plans. Consultant will admit the pt to [address] [place] [address]. Written by Miranda Gudino, ED Scribe, as dictated by Diamond Sage MD.    Diagnosis and Disposition     Critical Care Time: 12:00 AM  I have spent 45 minutes of critical care time involved in lab review, consultations with specialist, family decision-making, and documentation. During this entire length of time I was immediately available to the patient. Critical Care: The reason for providing this level of medical care for this critically ill patient was due a critical illness that impaired one or more vital organ systems such that there was a high probability of imminent or life threatening deterioration in the patients condition.  This care involved high complexity decision making to assess, manipulate, and support vital system functions, to treat this degree vital organ system failure and to prevent further life threatening deterioration of the patients condition. Core Measures:  For Hospitalized Patients:    1. Hospitalization Decision Time:  The decision to hospitalize the patient was made by Chapo Severino. Delta Carlton MD at 12:00 AM on 12/19/2019    2. Aspirin: Aspirin was not given because the patient did not present with a stroke at the time of their Emergency Department evaluation    11:29 PM  Patient is being admitted to the hospital by Jcarlos Amezcua MD. The results of their tests and reasons for their admission have been discussed with them and/or available family. They convey agreement and understanding for the need to be admitted and for their admission diagnosis. CONDITIONS ON ADMISSION:  Sepsis is not present at the time of admission. Deep Vein Thrombosis is not present at the time of admission. Thrombosis is not present at the time of admission. Urinary Tract Infection is not present at the time of admission. Pneumonia is not present at the time of admission. MRSA is not present at the time of admission. Wound infection is not present at the time of admission. Pressure Ulcer is not present at the time of admission. CLINICAL IMPRESSION:  1. Acute renal failure, unspecified acute renal failure type ([address]    2. Dehydration    3. Vomiting and diarrhea       PLAN:  1. ADMIT TO TELE  _______________________________    Attestations: This note is prepared by James Alvarado, acting as Scribe for Chapo Severino. Delta Carlton MD.    Chapo Severino. Delta Carlton MD:  The scribe's documentation has been prepared under my direction and personally reviewed by me in its entirety.  I confirm that the note above accurately reflects all work, treatment, procedures, and medical decision making performed by me.  _______________________________

## 2019-12-20 NOTE — PROGRESS NOTES
Hospitalist Progress Note    Patient: Brayan Bennett MRN: [id number]  CSN: [id number]    YOB: 1965  Age: 48 y.o. Sex: female    DOA: 12/19/2019 LOS:  LOS: 0 days          Chief Complaint:    ARF      Assessment/Plan     ARF improved-Cr 4 range to 1.75-continue IVF-get Comp renal US to check for marlys ither abnormality  Syncope due to dehydration-echo, ambulate, check orthostatics prior  Volume depletion-due to fasting \"for Mu-ism\" X 3 days  Bipolar disorder  NIDDM type 2      counselled on starving self and continuing to take meds for HTN and diabetes with risk for renal failure again and worsening disease    Repeat BMP in am  Follow BG levels  Continue IVF  Hold ACE and metformin, and other BP meds for now  echocardiogram  Can go home soon likely    Disposition :home tomorrow  Patient Active Problem List   Diagnosis Code    Pol[facility]ositis ([facility] M33.20    S/P total knee arthroplasty Z96.659    Surgery, elective Z41.9    Dehydration E86.0    ARF (acute renal failure) ([facility] N17.9    Vomiting and diarrhea R11.10, R19.7       Subjective:    I feel better  I will not fast like that again    I was trying to lose weight    Denies abd pain, chest pain, SOB  Eating her BF this am      Review of systems:    Constitutional: denies fevers, chills  Respiratory: denies SOB  Cardiovascular: denies chest pain  Gastrointestinal: denies nausea, vomiting, diarrhea      Vital signs/Intake and Output:  Visit Vitals  /70   Pulse 91   Temp 98.2 °F (36.8 °C)   Resp 18   SpO2 97%   Breastfeeding No     Current Shift:  No intake/output data recorded.   Last three shifts:  12/18 1901 - 12/20 0700  In: 1000 [I.V.:1000]  Out: -     Exam:    General: well developed AAF alert, NAD, OX3  CVS:Regular rate and rhythm, no M/R/G, S1/S2 heard, no thrill  Lungs:Clear to auscultation bilaterally, no wheezes, rhonchi, or rales  Abdomen: Soft, Nontender, No distention, Normal Bowel sounds, No hepatomegaly  Extremities: No C/C/E, pulses palpable 2+  Neuro:grossly normal , follows commands  Psych:appropriate                Labs: Results:       Chemistry Recent Labs     12/20/19 0430 12/19/19 2130   GLU 91 103*    136   K 3.5 5.0   * 102   CO2 21 24   BUN 51* 70*   CREA 1.75* 4.06*   CA 7.1* 8.9   AGAP 7 10   BUCR 29* 17   AP  --  206*   TP  --  8.2   ALB  --  4.2   GLOB  --  4.0   AGRAT  --  1.1      CBC w/Diff Recent Labs     12/20/19 0430 12/19/19 2130   WBC 8.9 10.8   RBC 4.02* 5.05   HGB 9.7* 12.3   HCT 31.3* 39.4    392   GRANS  --  71   LYMPH  --  24   EOS  --  1      Cardiac Enzymes Recent Labs     12/19/19 2130   *   CKND1 0.6      Coagulation No results for input(s): PTP, INR, APTT, INREXT in the last 72 hours. Lipid Panel Lab Results   Component Value Date/Time    Cholesterol, total 208 (H) 11/09/2010 08:32 AM    HDL Cholesterol 94 (H) 11/09/2010 08:32 AM    LDL, calculated 106.8 (H) 11/09/2010 08:32 AM    VLDL, calculated 7.2 11/09/2010 08:32 AM    Triglyceride 36 11/09/2010 08:32 AM    CHOL/HDL Ratio 2.2 11/09/2010 08:32 AM      BNP No results for input(s): BNPP in the last 72 hours.    Liver Enzymes Recent Labs     12/19/19 2130   TP 8.2   ALB 4.2   *   SGOT 24      Thyroid Studies Lab Results   Component Value Date/Time    T4, Total 9.9 11/09/2010 08:32 AM    T3 Uptake 31 11/09/2010 08:32 AM    TSH 1.65 05/05/2012 04:51 PM        Procedures/imaging: see electronic medical records for all procedures/Xrays and details which were not copied into this note but were reviewed prior to creation of Wes Larson MD

## 2019-12-20 NOTE — ED NOTES
Pt hourly rounding competed. Safety   Pt (X) resting on stretcher with side rails up and call bell in reach. Hospitalist at bedside.    () in chair    () in parents arms. Toileting   Pt offered ()Bedpan     ()Assistance to Restroom     ()Urinal  Ongoing Updates  Updated on plan of care and status of test results.   Pain Management  Inquired as to comfort and offered comfort measures:    () warm blankets   () dimmed lights

## 2019-12-21 VITALS
OXYGEN SATURATION: 100 % | RESPIRATION RATE: 16 BRPM | TEMPERATURE: 98.5 F | WEIGHT: 227.4 LBS | BODY MASS INDEX: 38.82 KG/M2 | DIASTOLIC BLOOD PRESSURE: 86 MMHG | SYSTOLIC BLOOD PRESSURE: 122 MMHG | HEART RATE: 89 BPM | HEIGHT: 64 IN

## 2019-12-21 LAB
ANION GAP SERPL CALC-SCNC: 4 MMOL/L (ref 3–18)
APPEARANCE UR: CLEAR
AV VELOCITY RATIO: 0.36
AV VTI RATIO: 0.7
BILIRUB UR QL: NEGATIVE
BUN SERPL-MCNC: 27 MG/DL (ref 7–18)
BUN/CREAT SERPL: 27 (ref 12–20)
CALCIUM SERPL-MCNC: 8.3 MG/DL (ref 8.5–10.1)
CHLORIDE SERPL-SCNC: 114 MMOL/L (ref 100–111)
CO2 SERPL-SCNC: 27 MMOL/L (ref 21–32)
COLOR UR: YELLOW
CREAT SERPL-MCNC: 1.01 MG/DL (ref 0.6–1.3)
ECHO AO ASC DIAM: 2.84 CM
ECHO AO SINUS VALSALVA DIAM: 2.61 CM
ECHO AV AREA PEAK VELOCITY: 1 CM2
ECHO AV AREA VTI: 2.1 CM2
ECHO AV AREA/BSA PEAK VELOCITY: 0.5 CM2/M2
ECHO AV AREA/BSA VTI: 1 CM2/M2
ECHO AV MEAN GRADIENT: 10.4 MMHG
ECHO AV MEAN VELOCITY: 1.54 M/S
ECHO AV PEAK GRADIENT: 16.8 MMHG
ECHO AV PEAK VELOCITY: 205.01 CM/S
ECHO AV VTI: 33.83 CM
ECHO IVC PROX: 1.8 CM
ECHO LA AREA 2C: 18.61 CM2
ECHO LA AREA 4C: 13.7 CM2
ECHO LA MAJOR AXIS: 4.09 CM
ECHO LA VOL 2C: 49.71 ML (ref 22–52)
ECHO LA VOL 4C: 31.56 ML (ref 22–52)
ECHO LA VOL BP: 44.99 ML (ref 22–52)
ECHO LA VOLUME INDEX A2C: 23.95 ML/M2 (ref 16–28)
ECHO LA VOLUME INDEX A4C: 15.2 ML/M2 (ref 16–28)
ECHO LV E' LATERAL VELOCITY: 13 CM/S
ECHO LV E' SEPTAL VELOCITY: 13 CM/S
ECHO LV EDV A2C: 65.4 ML
ECHO LV EDV A4C: 49.2 ML
ECHO LV EDV BP: 57.4 ML (ref 56–104)
ECHO LV EDV INDEX A4C: 23.7 ML/M2
ECHO LV EDV INDEX BP: 27.7 ML/M2
ECHO LV EDV NDEX A2C: 31.5 ML/M2
ECHO LV EDV TEICHHOLZ: 35.18 ML
ECHO LV EJECTION FRACTION A2C: 77 %
ECHO LV EJECTION FRACTION A4C: 61 %
ECHO LV EJECTION FRACTION BIPLANE: 68.8 % (ref 55–100)
ECHO LV ESV A2C: 15.3 ML
ECHO LV ESV A4C: 19.3 ML
ECHO LV ESV BP: 17.9 ML (ref 19–49)
ECHO LV ESV INDEX A2C: 7.4 ML/M2
ECHO LV ESV INDEX A4C: 9.3 ML/M2
ECHO LV ESV INDEX BP: 8.6 ML/M2
ECHO LV ESV TEICHHOLZ: 6.99 ML
ECHO LV INTERNAL DIMENSION DIASTOLIC: 4.15 CM (ref 3.9–5.3)
ECHO LV INTERNAL DIMENSION SYSTOLIC: 2.14 CM
ECHO LV IVSD: 1.5 CM (ref 0.6–0.9)
ECHO LV MASS 2D: 228.5 G (ref 67–162)
ECHO LV MASS INDEX 2D: 110.1 G/M2 (ref 43–95)
ECHO LV POSTERIOR WALL DIASTOLIC: 1.08 CM (ref 0.6–0.9)
ECHO LV POSTERIOR WALL SYSTOLIC: 0 CM
ECHO LVOT DIAM: 1.93 CM
ECHO LVOT PEAK GRADIENT: 2.1 MMHG
ECHO LVOT PEAK VELOCITY: 73.01 CM/S
ECHO LVOT VTI: 24.48 CM
ECHO MV A VELOCITY: 116.42 CM/S
ECHO MV AREA PHT: 4.6 CM2
ECHO MV E DECELERATION TIME (DT): 166 MS
ECHO MV E VELOCITY: 93.1 CM/S
ECHO MV E/A RATIO: 0.8
ECHO MV E/E' LATERAL: 7.16
ECHO MV E/E' RATIO (AVERAGED): 7.16
ECHO MV E/E' SEPTAL: 7.16
ECHO MV PRESSURE HALF TIME (PHT): 48.1 MS
ECHO PV MAX VELOCITY: 152.26 CM/S
ECHO PV PEAK GRADIENT: 9.3 MMHG
ECHO RA AREA 4C: 16.45 CM2
ECHO RA VOLUME: 44.6 ML
ECHO RV INTERNAL DIMENSION: 3.86 CM
ECHO RV TAPSE: 3.03 CM (ref 1.5–2)
ECHO RVOT PEAK VELOCITY: 85.53 CM/S
ERYTHROCYTE [DISTWIDTH] IN BLOOD BY AUTOMATED COUNT: 16.2 % (ref 11.6–14.5)
GLUCOSE BLD STRIP.AUTO-MCNC: 87 MG/DL (ref 70–110)
GLUCOSE SERPL-MCNC: 87 MG/DL (ref 74–99)
GLUCOSE UR STRIP.AUTO-MCNC: NEGATIVE MG/DL
HCT VFR BLD AUTO: 30.9 % (ref 35–45)
HGB BLD-MCNC: 9.3 G/DL (ref 12–16)
HGB UR QL STRIP: NEGATIVE
KETONES UR QL STRIP.AUTO: NEGATIVE MG/DL
LEUKOCYTE ESTERASE UR QL STRIP.AUTO: NEGATIVE
LVFS 2D: 48.35 %
LVOT MG: 4.61 MMHG
LVOT MV: 0.99 CM/S
LVSV (MOD BI): 18.17 ML
LVSV (MOD SINGLE 4C): 13.79 ML
LVSV (MOD SINGLE): 23.12 ML
LVSV (TEICH): 28.2 ML
MCH RBC QN AUTO: 24.1 PG (ref 24–34)
MCHC RBC AUTO-ENTMCNC: 30.1 G/DL (ref 31–37)
MCV RBC AUTO: 80.1 FL (ref 74–97)
MV DEC SLOPE: 5.61
NITRITE UR QL STRIP.AUTO: NEGATIVE
PH UR STRIP: 6 [PH] (ref 5–8)
PLATELET # BLD AUTO: 290 K/UL (ref 135–420)
PMV BLD AUTO: 10 FL (ref 9.2–11.8)
POTASSIUM SERPL-SCNC: 4.7 MMOL/L (ref 3.5–5.5)
PROT UR STRIP-MCNC: NEGATIVE MG/DL
RBC # BLD AUTO: 3.86 M/UL (ref 4.2–5.3)
SODIUM SERPL-SCNC: 145 MMOL/L (ref 136–145)
SP GR UR REFRACTOMETRY: 1.01 (ref 1–1.03)
UROBILINOGEN UR QL STRIP.AUTO: 0.2 EU/DL (ref 0.2–1)
WBC # BLD AUTO: 7 K/UL (ref 4.6–13.2)

## 2019-12-21 PROCEDURE — 74011250636 HC RX REV CODE- 250/636: Performed by: FAMILY MEDICINE

## 2019-12-21 PROCEDURE — 81003 URINALYSIS AUTO W/O SCOPE: CPT

## 2019-12-21 PROCEDURE — 80048 BASIC METABOLIC PNL TOTAL CA: CPT

## 2019-12-21 PROCEDURE — 85027 COMPLETE CBC AUTOMATED: CPT

## 2019-12-21 PROCEDURE — 87086 URINE CULTURE/COLONY COUNT: CPT

## 2019-12-21 PROCEDURE — 36415 COLL VENOUS BLD VENIPUNCTURE: CPT

## 2019-12-21 PROCEDURE — 74011250637 HC RX REV CODE- 250/637: Performed by: FAMILY MEDICINE

## 2019-12-21 PROCEDURE — 74011250636 HC RX REV CODE- 250/636: Performed by: HOSPITALIST

## 2019-12-21 PROCEDURE — 82962 GLUCOSE BLOOD TEST: CPT

## 2019-12-21 RX ORDER — ACETAMINOPHEN 325 MG/1
650 TABLET ORAL
Qty: 30 TAB | Refills: 0 | Status: SHIPPED
Start: 2019-12-21 | End: 2020-07-06

## 2019-12-21 RX ADMIN — HEPARIN SODIUM 5000 UNITS: 5000 INJECTION INTRAVENOUS; SUBCUTANEOUS at 04:01

## 2019-12-21 RX ADMIN — SODIUM CHLORIDE 100 ML/HR: 900 INJECTION, SOLUTION INTRAVENOUS at 05:54

## 2019-12-21 RX ADMIN — ACETAMINOPHEN 650 MG: 325 TABLET ORAL at 04:21

## 2019-12-21 NOTE — DISCHARGE SUMMARY
Discharge Summary    Patient: Missy Coates MRN: [id number]  CSN: [id number]    YOB: 1965  Age: 48 y.o. Sex: female    DOA: 12/19/2019 LOS:  LOS: 1 day   Discharge Date:      Primary Care Provider:  Marry Aggarwal NP    Admission Diagnoses: ARF (acute renal failure) ([facility] [N17.9]  Dehydration [E86.0]  Vomiting and diarrhea [R11.10, R19.7]    Discharge Diagnoses:    Problem List as of 12/21/2019 Date Reviewed: 6/2/2014          Codes Class Noted - Resolved    Dehydration ICD-10-CM: E86.0  ICD-9-CM: 276.51  12/20/2019 - Present        ARF (acute renal failure) ([facility] ICD-10-CM: N17.9  ICD-9-CM: 584.9  12/20/2019 - Present        Vomiting and diarrhea ICD-10-CM: R11.10, R19.7  ICD-9-CM: 787.03, 787.91  12/20/2019 - Present        S/P total knee arthroplasty ICD-10-CM: C64.449  ICD-9-CM: V43.65  6/2/2014 - Present        Surgery, elective ICD-10-CM: Z41.9  ICD-9-CM: V50.9  6/2/2014 - Present        Polymyositis ([facility] ICD-10-CM: M33.20  ICD-9-CM: 710.4  6/6/2012 - Present              Discharge Medications:     Current Discharge Medication List      START taking these medications    Details   acetaminophen (TYLENOL) 325 mg tablet Take 2 Tabs by mouth every four (4) hours as needed for Pain or Fever. Qty: 30 Tab, Refills: 0         CONTINUE these medications which have NOT CHANGED    Details   QUEtiapine SR (SEROQUEL XR) 50 mg sr tablet Take 50 mg by mouth daily. Total dose 350 mg      zolpidem (AMBIEN) 10 mg tablet Take 10 mg by mouth nightly as needed for Sleep. ALPRAZolam (XANAX) 1 mg tablet Take 1 mg by mouth three (3) times daily as needed for Anxiety. amLODIPine (NORVASC) 10 mg tablet Take 5 mg by mouth daily.   Refills: 3         STOP taking these medications       minoxidil (LONITEN) 2.5 mg tablet Comments:   Reason for Stopping:         dilTIAZem CD (CARDIZEM CD) 240 mg ER capsule Comments:   Reason for Stopping:         ibuprofen (MOTRIN) 600 mg tablet Comments: Reason for Stopping:         traMADol (ULTRAM) 50 mg tablet Comments:   Reason for Stopping:         lisinopril (PRINIVIL, ZESTRIL) 20 mg tablet Comments:   Reason for Stopping:               Discharge Condition: Good    Procedures :      Consults: Nephrology  Alis Tamayo MD   Physician   Nephrology   Consults   Signed   Date of Service:  12/20/19 1120               Expand All Collapse All      []Hide copied text    []Hover for details  Problem List     1. TIKA   2. Dehydration   3. Fasting  4. Morbid obesity  5. Ho knee surgery            Medical Decision Making / Assessment      Sydell Burn Yojana Ramos is a 48 y.o. female with medical problems as list above. Presenting with TIKA     Plan     ? Resolving with hydration  ? Cont for 24 hours   ? Cousneled to prevent reoccurence   ? Avoid Nephrotoxins  ? Avoid NSAIDs  ? Avoid contrast dye studies unless absolutely necessary   ? Low potassium renal diet   ? Dose all medications for creatinine clearance  ? Daily renal panel            PHYSICAL EXAM    Visit Vitals  /86 (BP 1 Location: Left arm, BP Patient Position: At rest)   Pulse 89   Temp 98.5 °F (36.9 °C)   Resp 16   Ht 5' 4\" (1.626 m)   Wt 103.1 kg (227 lb 6.4 oz)   SpO2 100%   Breastfeeding No   BMI 39.03 kg/m²     General: Awake, cooperative, no acute distress    HEENT: NC, Atraumatic. PERRLA, EOMI. Anicteric sclerae. Lungs:  CTA Bilaterally. No Wheezing/Rhonchi/Rales. Heart:  Regular  rhythm,  No murmur, No Rubs, No Gallops  Abdomen: Soft, Non distended, Non tender. +Bowel sounds,   Extremities: No c/c/e  Psych:   Not anxious or agitated. Neurologic:  No acute neurological deficits.                                      Admission HPI :    HPI:      Deja Vázquez is a 48 y.o. female with past medical history of diabetes, hypertension, psychiatric disorder not otherwise specified the patient states his bipolar disorder, restless leg syndrome, and hyperlipidemia presents via [facility] the ED c/o lightheadedness with standing and an episode of syncope x just PTA in the ED. Pt notes associated symptoms of blurry vision, and recent diarrhea prior to starting a 3 day Jain fast. Pt states that she has been religiously fasting (not eating) Monday, Tuesday, and Wednesday (last 3 days). Pt has still been drinking water and taking her medications. Her blood sugar was in the 130s en route to the ED. Pt takes Metformin only for DM. Pt's PCP has recently lowered her blood pressure medications, due to low blood pressures in the office. Pt denies any history of strokes or MI. Pt has a FHx of HTN and DM. Pt does not regularly take cough or cold medicines. Pt was treated for PNA this summer, but has not had any reoccurrence.  Pt also notes bilateral knee pain with acute on chronic exacerbation of baseline arthritis s/p fall.          Past Medical History:   Diagnosis Date    Anxiety      Arthritis      Cancer (HCC)       endometrial     Chronic pain       legs knees headaches    Coagulation defects       anemia    Depression      HX OTHER MEDICAL       muscle cramping    Hypertension      Other ill-defined conditions(449.89)       muscle spasms in legs    Unspecified sleep apnea       in the past had gastric bypass               Past Surgical History:   Procedure Laterality Date    HX CHOLECYSTECTOMY        HX GASTRIC BYPASS   2005    HX HYSTERECTOMY        HX KNEE REPLACEMENT        HX ORTHOPAEDIC         LCTR               Family History   Problem Relation Age of Onset    Malignant Hyperthermia Neg Hx      Pseudocholinesterase Deficiency Neg Hx      Delayed Awakening Neg Hx      Post-op Nausea/Vomiting Neg Hx      Emergence Delirium Neg Hx      Post-op Cognitive Dysfunction Neg Hx      Other Neg Hx            Hospital Course :  ARF improved-Cr is improved and at baseline    Syncope due to dehydration- Echo needs to be fu by outpt    Volume depletion -- improved    Bipolar disorder  - on meds    NIDDM type 2  -- fu with PCP in 2-3 days        counselled on starving self and continuing to take meds for HTN and diabetes with risk for renal failure again and worsening disease   Activity: Activity as tolerated    Diet: Diabetic Diet    Follow-up:   pcp in 2-3 days    Disposition:  Home    Minutes spent on discharge:  50      Labs: Results:       Chemistry Recent Labs     12/21/19 0305 12/20/19 0430 12/19/19 2130   GLU 87 91 103*    144 136   K 4.7 3.5 5.0   * 116* 102   CO2 27 21 24   BUN 27* 51* 70*   CREA 1.01 1.75* 4.06*   CA 8.3* 7.1* 8.9   AGAP 4 7 10   BUCR 27* 29* 17   AP  --   --  206*   TP  --   --  8.2   ALB  --   --  4.2   GLOB  --   --  4.0   AGRAT  --   --  1.1      CBC w/Diff Recent Labs     12/21/19 0305 12/20/19 0430 12/19/19 2130   WBC 7.0 8.9 10.8   RBC 3.86* 4.02* 5.05   HGB 9.3* 9.7* 12.3   HCT 30.9* 31.3* 39.4    316 392   GRANS  --   --  71   LYMPH  --   --  24   EOS  --   --  1      Cardiac Enzymes Recent Labs     12/19/19 2130   *   CKND1 0.6      Coagulation No results for input(s): PTP, INR, APTT, INREXT in the last 72 hours. Lipid Panel Lab Results   Component Value Date/Time    Cholesterol, total 208 (H) 11/09/2010 08:32 AM    HDL Cholesterol 94 (H) 11/09/2010 08:32 AM    LDL, calculated 106.8 (H) 11/09/2010 08:32 AM    VLDL, calculated 7.2 11/09/2010 08:32 AM    Triglyceride 36 11/09/2010 08:32 AM    CHOL/HDL Ratio 2.2 11/09/2010 08:32 AM      BNP No results for input(s): BNPP in the last 72 hours.    Liver Enzymes Recent Labs     12/19/19 2130   TP 8.2   ALB 4.2   *   SGOT 24      Thyroid Studies Lab Results   Component Value Date/Time    T4, Total 9.9 11/09/2010 08:32 AM    T3 Uptake 31 11/09/2010 08:32 AM    TSH 0.58 12/20/2019 04:30 AM            Significant Diagnostic Studies: Xr Chest Sngl V    Result Date: 12/19/2019  EXAM:  AP Portable Chest X-ray 1 view INDICATION: Fall syncope COMPARISON: July 7, 2019 _______________ FINDINGS:  Heart and mediastinal contours are within normal limits for portable radiograph. Lungs are clear of active disease. There are no pleural effusions. No acute osseous findings. ________________      IMPRESSION:  No acute process    Xr Knee Lt Min 4 V    Result Date: 12/19/2019  EXAM: Left knee x-rays 4 views. AP, Lateral and Oblique INDICATION: Fall syncope COMPARISON: December 4, 2018 ___________ FINDINGS: There is osteopenia. There is a total knee arthroplasty with long tibial and femoral stem components. There is erosion of the medial femoral condyle similar to prior exam. There is also lucency between the bone metal interface along the medial tibial plateau approximately 3 mm. Is also loosening of the femoral component with lucency between the bone metal interface approximately 3 mm. No acute fracture or subluxation seen. There is no joint effusion. ____________     IMPRESSION: No acute fractures or subluxation of left knee. Total left knee arthroplasty with long tibial and femoral stem components with lucency between the bone metal interface is approximately 3 mm concerning for loosening. This is new from December 2018. Xr Knee Rt Min 4 V    Result Date: 12/19/2019  EXAM: Right knee x-rays 4 views. AP, Lateral and Oblique INDICATION: Fall syncope COMPARISON: October 7, 2016 ___________ FINDINGS: There are no acute fractures or subluxation. There is moderate osteoarthritis with spurring and joint space narrowing. There is no joint effusion. Soft tissues are unremarkable. There is normal bone mineralization. ____________     IMPRESSION: No acute fractures or subluxation of right knee. Ct Head Wo Cont    Result Date: 12/20/2019  EXAM: CT head INDICATION: Syncope/fainting.  COMPARISON: 5/30/2012 TECHNIQUE: Axial CT imaging of the head was performed without intravenous contrast. One or more dose reduction techniques were used on this CT: automated exposure control, adjustment of the mAs and/or kVp according to patient size, and iterative reconstruction techniques. The specific techniques used on this CT exam have been documented in the patient's electronic medical record. Digital Imaging and Communications in Medicine (DICOM) format image data are available to nonaffiliated external healthcare facilities or entities on a secured, media free, reciprocally searchable basis with patient authorization for at least a 12-month period after this study. _______________ FINDINGS: BRAIN AND POSTERIOR FOSSA: There is no intracranial hemorrhage, mass effect, or midline shift. There are no areas of abnormal parenchymal attenuation. EXTRA-AXIAL SPACES AND MENINGES: There are no abnormal extra-axial fluid collections. CALVARIUM: Intact. SINUSES: Clear. OTHER: None. _______________     IMPRESSION: 1. No acute intracranial process. Us Retroperitoneum Comp    Result Date: 12/20/2019  Retroperitoneal Ultrasound History: Acute renal failure. Dehydration. Nausea with vomiting Comparison: No prior study available for comparison. Technique: Multiple gray scale sonographic images of the kidneys and bladder were obtained. Additional color Doppler and the Doppler waveform images were obtained . Findings: The right kidney measures 9.8 cm in length and is normal in echotexture. No focal lesions are seen. There is no evidence of hydronephrosis. The left kidney measures 9.7 cm in length and is normal in echotexture. No focal lesions are seen. There is no evidence of hydronephrosis. The bladder is unremarkable. The estimated volume of the bladder pre-void is 130 mL. The estimated postvoid residual is 0 mL. Bilateral ureteral jets were visualized. IMPRESSION: Normal appearance of bilateral kidneys without evidence of obstructive nephropathy. No results found for this or any previous visit.            CC: King Millard NP

## 2019-12-21 NOTE — PROGRESS NOTES
2352-Resting quietly in bed. Stable. Call light and personal items within reach. White board updated. 0049-Assessment complete. Call light and personal items within reach. Stable. No complaints. 0411-No change from initial assessment. Stable. Ambulated to bathroom and back to bed.    0421-Medicated for pain at request.  Stable. 0600-Resting in bed. No complaints. Shift Summary:  Uneventful shift. Rested quietly. Stable at shift change.

## 2019-12-21 NOTE — PROGRESS NOTES
Problem: Falls - Risk of  Goal: *Absence of Falls  Description  Document Benjamín Daily Fall Risk and appropriate interventions in the flowsheet.   Outcome: Progressing Towards Goal  Note: Fall Risk Interventions:       Mentation Interventions: Adequate sleep, hydration, pain control, Door open when patient unattended, Eyeglasses and hearing aids, Increase mobility, Room close to nurse's station, Toileting rounds, Update white board    Medication Interventions: Evaluate medications/consider consulting pharmacy         History of Falls Interventions: Evaluate medications/consider consulting pharmacy, Room close to nurse's station         Problem: Patient Education: Go to Patient Education Activity  Goal: Patient/Family Education  Outcome: Progressing Towards Goal

## 2019-12-21 NOTE — ROUTINE PROCESS
Bedside and Verbal shift change report given to Natali Palomares RN (oncoming nurse) by Ede Longoria RN (offgoing nurse). Report included the following information SBAR and Kardex.

## 2019-12-21 NOTE — ROUTINE PROCESS
Bedside and Verbal shift change report given to Mercedes Dill RN (oncoming nurse) by Elina Conley RN (offgoing nurse). Report included the following information SBAR and Kardex.

## 2019-12-21 NOTE — PROGRESS NOTES
Bedside and Verbal shift change report given to CAR Dunaway RN (oncoming nurse) by ARIES Bowser RN (offgoing nurse). Report included the following information SBAR, Kardex, Intake/Output, MAR and Recent Results.

## 2019-12-22 LAB
BACTERIA SPEC CULT: NORMAL
SERVICE CMNT-IMP: NORMAL

## 2020-07-06 ENCOUNTER — APPOINTMENT (OUTPATIENT)
Dept: GENERAL RADIOLOGY | Age: 55
End: 2020-07-06
Attending: PHYSICIAN ASSISTANT
Payer: MEDICARE

## 2020-07-06 ENCOUNTER — HOSPITAL ENCOUNTER (EMERGENCY)
Age: 55
Discharge: HOME | End: 2020-07-06
Attending: EMERGENCY MEDICINE
Payer: MEDICARE

## 2020-07-06 ENCOUNTER — APPOINTMENT (OUTPATIENT)
Dept: CT IMAGING | Age: 55
End: 2020-07-06
Attending: PHYSICIAN ASSISTANT
Payer: MEDICARE

## 2020-07-06 VITALS
WEIGHT: 237 LBS | HEIGHT: 64 IN | OXYGEN SATURATION: 99 % | SYSTOLIC BLOOD PRESSURE: 142 MMHG | TEMPERATURE: 99 F | DIASTOLIC BLOOD PRESSURE: 85 MMHG | RESPIRATION RATE: 22 BRPM | BODY MASS INDEX: 40.46 KG/M2 | HEART RATE: 98 BPM

## 2020-07-06 DIAGNOSIS — S43.401A SPRAIN OF RIGHT SHOULDER, UNSPECIFIED SHOULDER SPRAIN TYPE, INITIAL ENCOUNTER: ICD-10-CM

## 2020-07-06 DIAGNOSIS — Z20.822 PERSON UNDER INVESTIGATION FOR COVID-19: ICD-10-CM

## 2020-07-06 DIAGNOSIS — S83.92XA SPRAIN OF LEFT KNEE, UNSPECIFIED LIGAMENT, INITIAL ENCOUNTER: ICD-10-CM

## 2020-07-06 DIAGNOSIS — J06.9 UPPER RESPIRATORY TRACT INFECTION, UNSPECIFIED TYPE: Primary | ICD-10-CM

## 2020-07-06 LAB
ALBUMIN SERPL-MCNC: 3.5 G/DL (ref 3.4–5)
ALBUMIN/GLOB SERPL: 0.9 {RATIO} (ref 0.8–1.7)
ALP SERPL-CCNC: 206 U/L (ref 45–117)
ALT SERPL-CCNC: 20 U/L (ref 13–56)
ANION GAP SERPL CALC-SCNC: 5 MMOL/L (ref 3–18)
AST SERPL-CCNC: 17 U/L (ref 10–38)
ATRIAL RATE: 103 BPM
BASOPHILS # BLD: 0.1 K/UL (ref 0–0.1)
BASOPHILS NFR BLD: 1 % (ref 0–2)
BILIRUB SERPL-MCNC: 0.4 MG/DL (ref 0.2–1)
BUN SERPL-MCNC: 22 MG/DL (ref 7–18)
BUN/CREAT SERPL: 19 (ref 12–20)
CALCIUM SERPL-MCNC: 8.6 MG/DL (ref 8.5–10.1)
CALCULATED P AXIS, ECG09: 72 DEGREES
CALCULATED R AXIS, ECG10: 17 DEGREES
CALCULATED T AXIS, ECG11: 62 DEGREES
CHLORIDE SERPL-SCNC: 110 MMOL/L (ref 100–111)
CK MB CFR SERPL CALC: 0.8 % (ref 0–4)
CK MB SERPL-MCNC: 1.5 NG/ML (ref 5–25)
CK SERPL-CCNC: 198 U/L (ref 26–192)
CO2 SERPL-SCNC: 27 MMOL/L (ref 21–32)
CREAT SERPL-MCNC: 1.14 MG/DL (ref 0.6–1.3)
D DIMER PPP FEU-MCNC: 2.21 UG/ML(FEU)
DIAGNOSIS, 93000: NORMAL
DIFFERENTIAL METHOD BLD: ABNORMAL
EOSINOPHIL # BLD: 0.2 K/UL (ref 0–0.4)
EOSINOPHIL NFR BLD: 2 % (ref 0–5)
ERYTHROCYTE [DISTWIDTH] IN BLOOD BY AUTOMATED COUNT: 15.8 % (ref 11.6–14.5)
GLOBULIN SER CALC-MCNC: 4 G/DL (ref 2–4)
GLUCOSE SERPL-MCNC: 95 MG/DL (ref 74–99)
HCT VFR BLD AUTO: 36 % (ref 35–45)
HGB BLD-MCNC: 10.9 G/DL (ref 12–16)
LYMPHOCYTES # BLD: 2.3 K/UL (ref 0.9–3.6)
LYMPHOCYTES NFR BLD: 27 % (ref 21–52)
MCH RBC QN AUTO: 24.1 PG (ref 24–34)
MCHC RBC AUTO-ENTMCNC: 30.3 G/DL (ref 31–37)
MCV RBC AUTO: 79.5 FL (ref 74–97)
MONOCYTES # BLD: 0.6 K/UL (ref 0.05–1.2)
MONOCYTES NFR BLD: 6 % (ref 3–10)
NEUTS SEG # BLD: 5.5 K/UL (ref 1.8–8)
NEUTS SEG NFR BLD: 64 % (ref 40–73)
P-R INTERVAL, ECG05: 148 MS
PLATELET # BLD AUTO: 379 K/UL (ref 135–420)
PMV BLD AUTO: 9.4 FL (ref 9.2–11.8)
POTASSIUM SERPL-SCNC: 4.2 MMOL/L (ref 3.5–5.5)
PROT SERPL-MCNC: 7.5 G/DL (ref 6.4–8.2)
Q-T INTERVAL, ECG07: 356 MS
QRS DURATION, ECG06: 86 MS
QTC CALCULATION (BEZET), ECG08: 466 MS
RBC # BLD AUTO: 4.53 M/UL (ref 4.2–5.3)
SODIUM SERPL-SCNC: 142 MMOL/L (ref 136–145)
TROPONIN I SERPL-MCNC: <0.02 NG/ML (ref 0–0.04)
VENTRICULAR RATE, ECG03: 103 BPM
WBC # BLD AUTO: 8.6 K/UL (ref 4.6–13.2)

## 2020-07-06 PROCEDURE — 99285 EMERGENCY DEPT VISIT HI MDM: CPT

## 2020-07-06 PROCEDURE — 73030 X-RAY EXAM OF SHOULDER: CPT

## 2020-07-06 PROCEDURE — 80053 COMPREHEN METABOLIC PANEL: CPT

## 2020-07-06 PROCEDURE — 87635 SARS-COV-2 COVID-19 AMP PRB: CPT

## 2020-07-06 PROCEDURE — 71275 CT ANGIOGRAPHY CHEST: CPT

## 2020-07-06 PROCEDURE — 93005 ELECTROCARDIOGRAM TRACING: CPT

## 2020-07-06 PROCEDURE — 73560 X-RAY EXAM OF KNEE 1 OR 2: CPT

## 2020-07-06 PROCEDURE — 74011636320 HC RX REV CODE- 636/320: Performed by: EMERGENCY MEDICINE

## 2020-07-06 PROCEDURE — 85379 FIBRIN DEGRADATION QUANT: CPT

## 2020-07-06 PROCEDURE — 85025 COMPLETE CBC W/AUTO DIFF WBC: CPT

## 2020-07-06 PROCEDURE — 82550 ASSAY OF CK (CPK): CPT

## 2020-07-06 PROCEDURE — 71045 X-RAY EXAM CHEST 1 VIEW: CPT

## 2020-07-06 RX ADMIN — IOPAMIDOL 100 ML: 755 INJECTION, SOLUTION INTRAVENOUS at 17:30

## 2020-07-06 NOTE — DISCHARGE INSTRUCTIONS
Patient Education        Coronavirus (DTPHV-01): Care Instructions  Overview  The coronavirus disease (COVID-19) is caused by a virus. Symptoms may include a fever, a cough, and shortness of breath. It mainly spreads person-to-person through droplets from coughing and sneezing. The virus also can spread when people are in close contact with someone who is infected. Most people have mild symptoms and can take care of themselves at home. If their symptoms get worse, they may need care in a hospital. There is no medicine to fight the virus. It's important to not spread the virus to others. If you have COVID-19, wear a face cover anytime you are around other people. You need to isolate yourself while you are sick. Your doctor or local public health official will tell you when you no longer need to be isolated. Leave your home only if you need to get medical care. Follow-up care is a key part of your treatment and safety. Be sure to make and go to all appointments, and call your doctor if you are having problems. It's also a good idea to know your test results and keep a list of the medicines you take. How can you care for yourself at home? · Get extra rest. It can help you feel better. · Drink plenty of fluids. This helps replace fluids lost from fever. Fluids also help ease a scratchy throat. Water, soup, fruit juice, and hot tea with lemon are good choices. · Take acetaminophen (such as Tylenol) to reduce a fever. It may also help with muscle aches. Read and follow all instructions on the label. · Sponge your body with lukewarm water to help with fever. Don't use cold water or ice. · Use petroleum jelly on sore skin. This can help if the skin around your nose and lips becomes sore from rubbing a lot with tissues. Tips for isolation  · Wear a cloth face cover when you are around other people. It can help stop the spread of the virus when you cough or sneeze. · Limit contact with people in your home.  If possible, stay in a separate bedroom and use a separate bathroom. · If you have to leave home, avoid crowds and try to stay at least 6 feet away from other people. · Avoid contact with pets and other animals. · Cover your mouth and nose with a tissue when you cough or sneeze. Then throw it in the trash right away. · Wash your hands often, especially after you cough or sneeze. Use soap and water, and scrub for at least 20 seconds. If soap and water aren't available, use an alcohol-based hand . · Don't share personal household items. These include bedding, towels, cups and glasses, and eating utensils. · [address] in the warmest water allowed for the fabric type, and dry it completely. It's okay to wash other people's laundry with yours. · Clean and disinfect your home every day. Use household  and disinfectant wipes or sprays. Take special care to clean things that you grab with your hands. These include doorknobs, remote controls, phones, and handles on your refrigerator and microwave. And don't forget countertops, tabletops, bathrooms, and computer keyboards. When should you call for help? [id number] anytime you think you may need emergency care. For example, call if you have life-threatening symptoms, such as:  · You have severe trouble breathing. (You can't talk at all.)  · You have constant chest pain or pressure. · You are severely dizzy or lightheaded. · You are confused or can't think clearly. · Your face and lips have a blue color. · You pass out (lose consciousness) or are very hard to wake up. Call your doctor now or seek immediate medical care if:  · You have moderate trouble breathing. (You can't speak a full sentence.)  · You are coughing up blood (more than about 1 teaspoon). · You have signs of low blood pressure. These include feeling lightheaded; being too weak to stand; and having cold, pale, clammy skin.   Watch closely for changes in your health, and be sure to contact your doctor if:  · Your symptoms get worse. · You are not getting better as expected. Call before you go to the doctor's office. Follow their instructions. And wear a cloth face cover. Current as of: May 8, 2020               Content Version: 12.5  © 2006-2020 [employer]. Care instructions adapted under license by [email or web address] (which disclaims liability or warranty for this information). If you have questions about a medical condition or this instruction, always ask your healthcare professional. [place] [address] any warranty or liability for your use of this information.

## 2020-07-06 NOTE — ED PROVIDER NOTES
EMERGENCY DEPARTMENT HISTORY AND PHYSICAL EXAM    Date: 7/6/2020  Patient Name: Nirmal York    History of Presenting Illness     Chief Complaint   Patient presents with    Cough    Wheezing         History Provided By: Patient    4:11 PM  Nirmal York is a 48 y.o. female with PMHX of hypertension, restless legs, chronic knee pain who presents to the emergency department C/O nonproductive cough with intermittent wheezing and intermittent left-sided chest pain x1 month. Currently denies chest pain or shortness of breath. Prior to onset of symptoms, patient tested negative for COVID-19, denies any known exposure but sister suggested she be checked out. Patient also complains of left knee and right shoulder pain status post trip and fall when her knee \"gave out\" 1 week ago. She sometimes ambulates with a cane. past surgical history of gastric bypass, left knee replacement, cholecystectomy, hysterectomy. Pt denies fever, nausea, vomiting, diarrhea, shortness of breath, orthopnea, calf pain or swelling, history of DVT/PE or MI, and any other sxs or complaints. PCP: King Millard NP    Current Outpatient Medications   Medication Sig Dispense Refill    QUEtiapine SR (SEROQUEL XR) 50 mg sr tablet Take 50 mg by mouth daily. Total dose 350 mg      zolpidem (AMBIEN) 10 mg tablet Take 10 mg by mouth nightly as needed for Sleep.  ALPRAZolam (XANAX) 1 mg tablet Take 1 mg by mouth three (3) times daily as needed for Anxiety.  amLODIPine (NORVASC) 10 mg tablet Take 5 mg by mouth daily.   3       Past History     Past Medical History:  Past Medical History:   Diagnosis Date    Anxiety     Arthritis     Cancer ([facility] [address]     endometrial     Chronic pain     legs knees headaches    Coagulation defects     anemia    Depression     HX OTHER MEDICAL     muscle cramping    Hypertension     Other ill-defined conditions(799.89)     muscle spasms in legs    Unspecified sleep apnea     in the past had gastric bypass       Past Surgical History:  Past Surgical History:   Procedure Laterality Date    HX CHOLECYSTECTOMY      HX GASTRIC BYPASS  2005    HX HYSTERECTOMY      HX KNEE REPLACEMENT      HX ORTHOPAEDIC      [facility]       Family History:  Family History   Problem Relation Age of Onset    Malignant Hyperthermia Neg Hx     Pseudocholinesterase Deficiency Neg Hx     Delayed Awakening Neg Hx     Post-op Nausea/Vomiting Neg Hx     Emergence Delirium Neg Hx     Post-op Cognitive Dysfunction Neg Hx     Other Neg Hx        Social History:  Social History     Tobacco Use    Smoking status: Never Smoker    Smokeless tobacco: Never Used   Substance Use Topics    Alcohol use: No     Alcohol/week: 0.0 standard drinks    Drug use: No       Allergies: Allergies   Allergen Reactions    Percocet [Oxycodone-Acetaminophen] Itching         Review of Systems   Review of Systems   Constitutional: Negative for fever. Respiratory: Positive for cough and wheezing. Negative for shortness of breath. Cardiovascular: Positive for chest pain. Gastrointestinal: Negative for abdominal pain, nausea and vomiting. All other systems reviewed and are negative. Physical Exam     Vitals:    07/06/20 1530 07/06/20 1615 07/06/20 1645 07/06/20 1715   BP: (!) 156/94 114/71 113/70 123/75   Pulse: (!) 106 96 96 84   Resp: 20 14 19 17   Temp: 99 °F (37.2 °C)      SpO2: 96% 97% 100% 97%   Weight: 107.5 kg (237 lb)      Height: 5' 4\" (1.626 m)        Physical Exam    Vital signs and nursing notes reviewed. CONSTITUTIONAL: Alert. Well-appearing; well-nourished; in no apparent distress. HEAD: Normocephalic; atraumatic. EYES: PERRL; EOM's intact. No nystagmus. Conjunctiva clear. ENT: TM's normal. External ear normal. Normal nose; no rhinorrhea. Normal pharynx. Tonsils not enlarged without exudate. Moist mucus membranes. NECK: Supple; FROM without difficulty, non-tender; no cervical lymphadenopathy.              CV: Normal S1, S2; no murmurs, rubs, or gallops. No chest wall tenderness. RESPIRATORY: Normal chest excursion with respiration; breath sounds clear and equal bilaterally; no wheezes, rhonchi, or rales. GI: Normal bowel sounds; non-distended; non-tender; no guarding or rigidity; no palpable organomegaly. No CVA tenderness. BACK:  No evidence of trauma or deformity. Non-tender to palpation. FROM without difficulty. EXT: Normal ROM in all four extremities; non-tender to palpation. No edema or calf tenderness  SKIN: Normal for age and race; warm; dry; good turgor; no apparent lesions or exudate. NEURO: A & O x3. Cranial nerves 2-12 intact. Motor 5/5 bilaterally. Sensation intact. PSYCH:  Mood and affect appropriate. Diagnostic Study Results     Labs -     Recent Results (from the past 12 hour(s))   EKG, 12 LEAD, INITIAL    Collection Time: 07/06/20  3:41 PM   Result Value Ref Range    Ventricular Rate 103 BPM    Atrial Rate 103 BPM    P-R Interval 148 ms    QRS Duration 86 ms    Q-T Interval 356 ms    QTC Calculation (Bezet) 466 ms    Calculated P Axis 72 degrees    Calculated R Axis 17 degrees    Calculated T Axis 62 degrees    Diagnosis       Sinus tachycardia  Possible Anterior infarct (cited on or before 20-AUG-2019)  Abnormal ECG  When compared with ECG of 19-DEC-2019 22:34,  No significant change was found     CBC WITH AUTOMATED DIFF    Collection Time: 07/06/20  3:55 PM   Result Value Ref Range    WBC 8.6 4.6 - 13.2 K/uL    RBC 4.53 4.20 - 5.30 M/uL    HGB 10.9 (L) 12.0 - 16.0 g/dL    HCT 36.0 35.0 - 45.0 %    MCV 79.5 74.0 - 97.0 FL    MCH 24.1 24.0 - 34.0 PG    MCHC 30.3 (L) 31.0 - 37.0 g/dL    RDW 15.8 (H) 11.6 - 14.5 %    PLATELET 622 996 - 738 K/uL    MPV 9.4 9.2 - 11.8 FL    NEUTROPHILS 64 40 - 73 %    LYMPHOCYTES 27 21 - 52 %    MONOCYTES 6 3 - 10 %    EOSINOPHILS 2 0 - 5 %    BASOPHILS 1 0 - 2 %    ABS. NEUTROPHILS 5.5 1.8 - 8.0 K/UL    ABS. LYMPHOCYTES 2.3 0.9 - 3.6 K/UL    ABS.  MONOCYTES 0.6 0.05 - 1.2 K/UL    ABS. EOSINOPHILS 0.2 0.0 - 0.4 K/UL    ABS. BASOPHILS 0.1 0.0 - 0.1 K/UL    DF AUTOMATED     METABOLIC PANEL, COMPREHENSIVE    Collection Time: 07/06/20  3:55 PM   Result Value Ref Range    Sodium 142 136 - 145 mmol/L    Potassium 4.2 3.5 - 5.5 mmol/L    Chloride 110 100 - 111 mmol/L    CO2 27 21 - 32 mmol/L    Anion gap 5 3.0 - 18 mmol/L    Glucose 95 74 - 99 mg/dL    BUN 22 (H) 7.0 - 18 MG/DL    Creatinine 1.14 0.6 - 1.3 MG/DL    BUN/Creatinine ratio 19 12 - 20      GFR est AA >60 >60 ml/min/1.73m2    GFR est non-AA 50 (L) >60 ml/min/1.73m2    Calcium 8.6 8.5 - 10.1 MG/DL    Bilirubin, total 0.4 0.2 - 1.0 MG/DL    ALT (SGPT) 20 13 - 56 U/L    AST (SGOT) 17 10 - 38 U/L    Alk. phosphatase 206 (H) 45 - 117 U/L    Protein, total 7.5 6.4 - 8.2 g/dL    Albumin 3.5 3.4 - 5.0 g/dL    Globulin 4.0 2.0 - 4.0 g/dL    A-G Ratio 0.9 0.8 - 1.7     CARDIAC PANEL,(CK, CKMB & TROPONIN)    Collection Time: 07/06/20  3:55 PM   Result Value Ref Range    CK - MB 1.5 <3.6 ng/ml    CK-MB Index 0.8 0.0 - 4.0 %     (H) 26 - 192 U/L    Troponin-I, QT <0.02 0.0 - 0.045 NG/ML   D DIMER    Collection Time: 07/06/20  3:55 PM   Result Value Ref Range    D DIMER 2.21 (H) <0.46 ug/ml(FEU)   SARS-COV-2    Collection Time: 07/06/20  4:00 PM   Result Value Ref Range    SARS-CoV-2 PENDING     Specimen source Nasopharyngeal         Radiologic Studies -   CTA CHEST W OR W WO CONT   Final Result   IMPRESSION:      No evidence of a pulmonary embolism or aortic dissection. XR CHEST PORT   Final Result   IMPRESSION:      No acute cardiopulmonary abnormality. XR SHOULDER RT AP/LAT MIN 2 V   Final Result   IMPRESSION:      No evidence of acute fracture or dislocation. XR KNEE LT MAX 2 VWS   Final Result   IMPRESSION: Total left knee arthroplasty, without complication.         CT Results  (Last 48 hours)               07/06/20 1746  CTA CHEST W OR W WO CONT Final result    Impression:  IMPRESSION:       No evidence of a pulmonary embolism or aortic dissection. Narrative:  EXAM: CTA chest       INDICATION: Chest pain       COMPARISON: None       TECHNIQUE: Axial CT imaging from the thoracic inlet through the diaphragm with   intravenous contrast. Coronal and sagittal MIP reformats were generated. One or   more dose reduction techniques were used on this CT: automated exposure control,   adjustment of the mAs and/or kVp according to patient size, and iterative   reconstruction techniques. The specific techniques used on this CT exam have   been documented in the patient's electronic medical record. Digital Imaging and   Communications in Medicine (DICOM) format image data are available to   nonaffiliated external healthcare facilities or entities on a secure, media   free, reciprocally searchable basis with patient authorization for at least a   12-month period after this study. _______________       FINDINGS:       EXAM QUALITY: Overall exam quality is satisfactory. Pulmonary arterial   enhancement is adequate with adequate breath hold and no significant artifact. PULMONARY ARTERIES: No evidence of pulmonary embolism. LYMPH Nodes: No enlarged lymph nodes seen. PLEURA: No pleural effusion seen. HEART: Normal in size without pericardial effusion. VASCULATURE/MEDIASTINUM: There is no aortic dissection. LUNGS: No suspicious nodule or mass. No abnormal opacities. AIRWAY: Normal.       UPPER ABDOMEN: There is hepatic steatosis. Cholecystectomy. Otherwise visualized   solid organs are unremarkable. Post gastric bypass changes present. OTHER: No acute or aggressive osseous abnormalities identified. _______________               CXR Results  (Last 48 hours)               07/06/20 5338  XR CHEST PORT Final result    Impression:  IMPRESSION:       No acute cardiopulmonary abnormality.        Narrative:  EXAM: XR CHEST PORT       CLINICAL INDICATION/HISTORY: cough, CP   -Additional: None       COMPARISON: 12/19/2019       TECHNIQUE: Portable frontal view of the chest       _______________       FINDINGS:       SUPPORT DEVICES: None. HEART AND MEDIASTINUM: Cardiomediastinal silhouette within normal limits. LUNGS AND PLEURAL SPACES: No dense consolidation, large effusion or   pneumothorax.       _______________                 Medications given in the ED-  Medications   iopamidoL (ISOVUE-370) 76 % injection 100 mL (100 mL IntraVENous Given 7/6/20 2540)         Medical Decision Making   I am the first provider for this patient. I reviewed the vital signs, available nursing notes, past medical history, past surgical history, family history and social history. Vital Signs-Reviewed the patient's vital signs. Pulse Oximetry Analysis - 96% on RA     EKG interpretation: (Preliminary)  Sinus tachycardia, rate 103, no STEMI, no significant change from 12/19/2019    Records Reviewed: Nursing Notes, Old Medical Records, Previous electrocardiograms, Previous Radiology Studies and Previous Laboratory Studies      Procedures:  Procedures    ED Course:  4:11 PM   Initial assessment performed. The patients presenting problems have been discussed, and they are in agreement with the care plan formulated and outlined with them. I have encouraged them to ask questions as they arise throughout their visit. 5:39 PM  Patient's presentation, labs/imaging and plan of care was reviewed with Paulette Ferraro PA-C as part of sign out. They will follow up on CTA chest as part of the plan discussed with the patient. Randa's assistance in completion of this plan is greatly appreciated but it should be noted that I will be the provider of record for this patient. 1037 PM  -year-old female with history of hypertension chronic knee pain restless leg and obesity complaining of intermittent left-sided chest pain and wheezing for a month.   Patient mildly tachycardic but not hypoxic without fever on my assessment. Laboratory findings remarkable for elevated d-dimer but otherwise negative. CTA shows no acute abnormality. Reviewed tests with patient she is ambulatory without difficulty she has stable vital signs and is suitable for discharge outpatient management. We will follow-up with her primary care doctor in follow-up COVID testing pending at this time. She has a son that lives with he, she is requesting work note so he can make quarantine as well given that her Covid testing is still pending. Diagnosis and Disposition     The COVID-19 pandemic has been declared a public health emergency and has led to the need to minimize testing due to significant resource scarcity. I have explained to the patient the importance of home isolation and strict return precautions. Patient has been counseled about the need for self quarantine especially if feeling ill &/or if fever is present. DISCHARGE NOTE:    Dina Dc's  results have been reviewed with her. She has been counseled regarding her diagnosis, treatment, and plan. She verbally conveys understanding and agreement of the signs, symptoms, diagnosis, treatment and prognosis and additionally agrees to follow up as discussed. She also agrees with the care-plan and conveys that all of her questions have been answered. I have also provided discharge instructions for her that include: educational information regarding their diagnosis and treatment, and list of reasons why they would want to return to the ED prior to their follow-up appointment, should her condition change. She has been provided with education for proper emergency department utilization. CLINICAL IMPRESSION:    1. Upper respiratory tract infection, unspecified type    2. Person under investigation for COVID-19    3. Sprain of right shoulder, unspecified shoulder sprain type, initial encounter    4.  Sprain of left knee, unspecified ligament, initial encounter        PLAN:  1. D/C Home  2. Current Discharge Medication List        3. Follow-up Information     Follow up With Specialties Details Why Contact Info    William Shirley NP Nurse Practitioner Schedule an appointment as soon as possible for a visit  [address] [place] [address] [phone]      [facility] EMERGENCY DEPT Emergency Medicine  As needed, If symptoms worsen [address] [id number]  [phone]        _______________________________      Please note that this dictation was completed with [employer], the computer voice recognition software. Quite often unanticipated grammatical, syntax, homophones, and other interpretive errors are inadvertently transcribed by the computer software. Please disregard these errors. Please excuse any errors that have escaped final proofreading.

## 2020-07-06 NOTE — LETTER
NOTIFICATION RETURN TO WORK / SCHOOL 
 
7/6/2020 6:36 PM 
 
Ms. Mariza Hicks [address] [facility] [id number] To Whom It May Concern: 
 
Mariza Hicks is currently under the care of [facility] EMERGENCY DEPT. Her son Emilia Cárdenas may be excused from work until 7/10/2020 under quarantine instruction If there are questions or concerns please have the patient contact our office.  
 
 
 
Sincerely, 
 
 
GARRY Shah

## 2020-07-07 ENCOUNTER — PATIENT OUTREACH (OUTPATIENT)
Dept: CASE MANAGEMENT | Age: 55
End: 2020-07-07

## 2020-07-07 NOTE — PROGRESS NOTES
Patient contacted regarding [id number] suspect. Discussed COVID-19 related testing which was pending at this time. Test results were pending. Patient informed of results, if available? n/a     Care Coordinator contacted the patient by telephone to perform post discharge assessment. Verified name and  with patient as identifiers. Provided introduction to self, and explanation of the Care Coordinator role, and reason for call due to risk factors for infection and/or exposure to COVID-19. Symptoms reviewed with patient who verbalized the following symptoms: cough and no worsening symptoms. Due to no new or worsening symptoms encounter was not routed to provider for escalation. Discussed follow-up appointments. If no appointment was previously scheduled, appointment scheduling offered: [facility] follow up appointment(s): No future appointments. Non-[facility] follow up appointment(s): patient seen by pcp today      Patient has following risk factors of: htn. Care Coordinator reviewed discharge instructions, medical action plan and red flags such as increased shortness of breath, increasing fever and signs of decompensation with patient who verbalized understanding. Discussed exposure protocols and quarantine with CDC Guidelines What to do if you are sick with coronavirus disease .  Patient was given an opportunity for questions and concerns. The patient agrees to contact the [facility] exposure line [phone], [facility] [address]  ([phone]) and PCP office for questions related to their healthcare. Care Coordinator provided contact information for future needs. Reviewed and educated patient on any new and changed medications related to discharge diagnosis.     Patient/family/caregiver given information for [facility] and agrees to enroll no  Patient's preferred e-mail:  n/a  Patient's preferred phone number:n/a  Based on Loop alert triggers, patient will be contacted by nurse care manager for worsening symptoms. Plan for follow-up call in 1-2 days based on severity of symptoms and risk factors.

## 2020-07-09 LAB
SARS-COV-2, COV2NT: NOT DETECTED
SOURCE, COVRS: NORMAL

## 2020-07-14 ENCOUNTER — PATIENT OUTREACH (OUTPATIENT)
Dept: CASE MANAGEMENT | Age: 55
End: 2020-07-14

## 2020-07-14 NOTE — PROGRESS NOTES
Patient contacted regarding recent discharge and COVID-19 risk. Discussed COVID-19 related testing which was available at this time. Test results were negative. Patient informed of results, if available? yes    Care Coordinator contacted the patient by telephone to perform post discharge assessment. Verified name and  with patient as identifiers. Patient has following risk factors of: htn. Care Coordinator reviewed discharge instructions, medical action plan and red flags related to discharge diagnosis. Reviewed and educated them on any new and changed medications related to discharge diagnosis. Advised obtaining a 90-day supply of all daily and as-needed medications. Education provided regarding infection prevention, and signs and symptoms of COVID-19 and when to seek medical attention with patient who verbalized understanding. Discussed exposure protocols and quarantine from [address] you at higher risk for severe illness  and given an opportunity for questions and concerns. The patient agrees to contact r PCP office for questions related to their healthcare. Care Coordinator provided contact information for future reference. From CDC: Are you at higher risk for severe illness?  Wash your hands often.  Avoid close contact (6 feet, which is about two arm lengths) with people who are sick.  Put distance between yourself and other people if COVID-19 is spreading in your community.  Clean and disinfect frequently touched surfaces.  Avoid all cruise travel and non-essential air travel.  Call your healthcare professional if you have concerns about COVID-19 and your underlying condition or if you are sick.     For more information on steps you can take to protect yourself, see CDC's How to Protect Yourself      Patient/family/caregiver given information for [facility] and agrees to enroll no  Patient's preferred e-mail:  n/a  Patient's preferred phone number: n/a  Based on Loop alert triggers, patient will be contacted by nurse care manager for worsening symptoms. Plan for follow-up call in 7-14 days based on severity of symptoms and risk factors.

## 2020-07-21 ENCOUNTER — PATIENT OUTREACH (OUTPATIENT)
Dept: CASE MANAGEMENT | Age: 55
End: 2020-07-21

## 2020-08-17 ENCOUNTER — HOSPITAL ENCOUNTER (OUTPATIENT)
Dept: MRI IMAGING | Age: 55
Discharge: HOME | End: 2020-08-17
Attending: NURSE PRACTITIONER
Payer: MEDICARE

## 2020-08-17 DIAGNOSIS — M51.36 OTHER INTERVERTEBRAL DISC DEGENERATION, LUMBAR REGION: ICD-10-CM

## 2020-08-17 DIAGNOSIS — M54.9 DORSALGIA: ICD-10-CM

## 2020-08-17 PROCEDURE — 72148 MRI LUMBAR SPINE W/O DYE: CPT

## 2022-01-14 ENCOUNTER — HOSPITAL ENCOUNTER (EMERGENCY)
Age: 57
Discharge: HOME | End: 2022-01-14
Attending: EMERGENCY MEDICINE
Payer: MEDICARE

## 2022-01-14 VITALS
OXYGEN SATURATION: 98 % | BODY MASS INDEX: 39.61 KG/M2 | TEMPERATURE: 98 F | SYSTOLIC BLOOD PRESSURE: 188 MMHG | HEIGHT: 64 IN | RESPIRATION RATE: 15 BRPM | WEIGHT: 232 LBS | HEART RATE: 77 BPM | DIASTOLIC BLOOD PRESSURE: 109 MMHG

## 2022-01-14 DIAGNOSIS — R11.2 NAUSEA AND VOMITING, INTRACTABILITY OF VOMITING NOT SPECIFIED, UNSPECIFIED VOMITING TYPE: Primary | ICD-10-CM

## 2022-01-14 DIAGNOSIS — R03.0 ELEVATED BLOOD PRESSURE READING: ICD-10-CM

## 2022-01-14 LAB
ALBUMIN SERPL-MCNC: 3.8 G/DL (ref 3.4–5)
ALBUMIN/GLOB SERPL: 1.1 {RATIO} (ref 0.8–1.7)
ALP SERPL-CCNC: 162 U/L (ref 45–117)
ALT SERPL-CCNC: 28 U/L (ref 13–56)
ANION GAP SERPL CALC-SCNC: 6 MMOL/L (ref 3–18)
APPEARANCE UR: CLEAR
AST SERPL-CCNC: 23 U/L (ref 10–38)
BASOPHILS # BLD: 0.1 K/UL (ref 0–0.1)
BASOPHILS NFR BLD: 1 % (ref 0–2)
BILIRUB SERPL-MCNC: 0.4 MG/DL (ref 0.2–1)
BILIRUB UR QL: NEGATIVE
BUN SERPL-MCNC: 19 MG/DL (ref 7–18)
BUN/CREAT SERPL: 17 (ref 12–20)
CALCIUM SERPL-MCNC: 9.2 MG/DL (ref 8.5–10.1)
CHLORIDE SERPL-SCNC: 110 MMOL/L (ref 100–111)
CO2 SERPL-SCNC: 27 MMOL/L (ref 21–32)
COLOR UR: YELLOW
CREAT SERPL-MCNC: 1.11 MG/DL (ref 0.6–1.3)
DIFFERENTIAL METHOD BLD: ABNORMAL
EOSINOPHIL # BLD: 0.1 K/UL (ref 0–0.4)
EOSINOPHIL NFR BLD: 1 % (ref 0–5)
ERYTHROCYTE [DISTWIDTH] IN BLOOD BY AUTOMATED COUNT: 14.3 % (ref 11.6–14.5)
GLOBULIN SER CALC-MCNC: 3.6 G/DL (ref 2–4)
GLUCOSE SERPL-MCNC: 120 MG/DL (ref 74–99)
GLUCOSE UR STRIP.AUTO-MCNC: NEGATIVE MG/DL
HCT VFR BLD AUTO: 38.4 % (ref 35–45)
HGB BLD-MCNC: 11.5 G/DL (ref 12–16)
HGB UR QL STRIP: NEGATIVE
IMM GRANULOCYTES # BLD AUTO: 0 K/UL (ref 0–0.04)
IMM GRANULOCYTES NFR BLD AUTO: 0 % (ref 0–0.5)
KETONES UR QL STRIP.AUTO: NEGATIVE MG/DL
LEUKOCYTE ESTERASE UR QL STRIP.AUTO: NEGATIVE
LIPASE SERPL-CCNC: 295 U/L (ref 73–393)
LYMPHOCYTES # BLD: 1.9 K/UL (ref 0.9–3.6)
LYMPHOCYTES NFR BLD: 24 % (ref 21–52)
MCH RBC QN AUTO: 24.8 PG (ref 24–34)
MCHC RBC AUTO-ENTMCNC: 29.9 G/DL (ref 31–37)
MCV RBC AUTO: 82.8 FL (ref 78–100)
MONOCYTES # BLD: 0.3 K/UL (ref 0.05–1.2)
MONOCYTES NFR BLD: 4 % (ref 3–10)
NEUTS SEG # BLD: 5.4 K/UL (ref 1.8–8)
NEUTS SEG NFR BLD: 69 % (ref 40–73)
NITRITE UR QL STRIP.AUTO: NEGATIVE
NRBC # BLD: 0 K/UL (ref 0–0.01)
NRBC BLD-RTO: 0 PER 100 WBC
PH UR STRIP: 7 [PH] (ref 5–8)
PLATELET # BLD AUTO: 322 K/UL (ref 135–420)
PMV BLD AUTO: 10 FL (ref 9.2–11.8)
POTASSIUM SERPL-SCNC: 3.9 MMOL/L (ref 3.5–5.5)
PROT SERPL-MCNC: 7.4 G/DL (ref 6.4–8.2)
PROT UR STRIP-MCNC: NEGATIVE MG/DL
RBC # BLD AUTO: 4.64 M/UL (ref 4.2–5.3)
SODIUM SERPL-SCNC: 143 MMOL/L (ref 136–145)
SP GR UR REFRACTOMETRY: 1.01 (ref 1–1.03)
TROPONIN-HIGH SENSITIVITY: 8 NG/L (ref 0–54)
UROBILINOGEN UR QL STRIP.AUTO: 1 EU/DL (ref 0.2–1)
WBC # BLD AUTO: 7.9 K/UL (ref 4.6–13.2)

## 2022-01-14 PROCEDURE — 96361 HYDRATE IV INFUSION ADD-ON: CPT

## 2022-01-14 PROCEDURE — 85025 COMPLETE CBC W/AUTO DIFF WBC: CPT

## 2022-01-14 PROCEDURE — 93005 ELECTROCARDIOGRAM TRACING: CPT

## 2022-01-14 PROCEDURE — 80053 COMPREHEN METABOLIC PANEL: CPT

## 2022-01-14 PROCEDURE — 81003 URINALYSIS AUTO W/O SCOPE: CPT

## 2022-01-14 PROCEDURE — 99284 EMERGENCY DEPT VISIT MOD MDM: CPT

## 2022-01-14 PROCEDURE — 74011250636 HC RX REV CODE- 250/636: Performed by: PHYSICIAN ASSISTANT

## 2022-01-14 PROCEDURE — 83690 ASSAY OF LIPASE: CPT

## 2022-01-14 PROCEDURE — 84484 ASSAY OF TROPONIN QUANT: CPT

## 2022-01-14 PROCEDURE — 96374 THER/PROPH/DIAG INJ IV PUSH: CPT

## 2022-01-14 RX ORDER — ONDANSETRON 4 MG/1
4 TABLET, FILM COATED ORAL
Qty: 15 TABLET | Refills: 0 | Status: SHIPPED | OUTPATIENT
Start: 2022-01-14

## 2022-01-14 RX ORDER — ONDANSETRON 2 MG/ML
4 INJECTION INTRAMUSCULAR; INTRAVENOUS
Status: COMPLETED | OUTPATIENT
Start: 2022-01-14 | End: 2022-01-14

## 2022-01-14 RX ORDER — ONDANSETRON 4 MG/1
4 TABLET, FILM COATED ORAL
Qty: 15 TABLET | Refills: 0 | Status: SHIPPED | OUTPATIENT
Start: 2022-01-14 | End: 2022-01-14

## 2022-01-14 RX ADMIN — ONDANSETRON 4 MG: 2 INJECTION INTRAMUSCULAR; INTRAVENOUS at 19:05

## 2022-01-14 RX ADMIN — SODIUM CHLORIDE 500 ML: 9 INJECTION, SOLUTION INTRAVENOUS at 19:05

## 2022-01-14 NOTE — ED PROVIDER NOTES
EMERGENCY DEPARTMENT HISTORY AND PHYSICAL EXAM    Date: 1/14/2022  Patient Name: Matthew Cha    History of Presenting Illness     Chief Complaint   Patient presents with    Vomiting         History Provided By: Patient    Chief Complaint: vomiting       Additional History (Context):   6:32 PM  Matthew Cha is a 49 y.o. female with PMHx anxiety, depression, diabetes, hypertension presents to the emergency department C/O vomiting started earlier this afternoon after eating some seafood from a restaurant. No diarrhea but she has been having increased gas. She does have epigastric abdominal pain. She is slight headache. Denies chest pain or shortness of breath no leg swelling. Patient does have elevated blood pressure states she did take her blood pressure medication earlier today is normal.  She had it checked yesterday and it was normal.  . PCP: Ruby Tomas NP    Current Outpatient Medications   Medication Sig Dispense Refill    ondansetron hcl (Zofran) 4 mg tablet Take 1 Tablet by mouth every eight (8) hours as needed for Nausea. 15 Tablet 0    QUEtiapine SR (SEROQUEL XR) 50 mg sr tablet Take 50 mg by mouth daily. Total dose 350 mg      zolpidem (AMBIEN) 10 mg tablet Take 10 mg by mouth nightly as needed for Sleep.  ALPRAZolam (XANAX) 1 mg tablet Take 1 mg by mouth three (3) times daily as needed for Anxiety.  amLODIPine (NORVASC) 10 mg tablet Take 5 mg by mouth daily.   3       Past History     Past Medical History:  Past Medical History:   Diagnosis Date    Anxiety     Arthritis     Cancer ([facility] [address]     endometrial     Chronic pain     legs knees headaches    Coagulation defects     anemia    Depression     HX OTHER MEDICAL     muscle cramping    Hypertension     Other ill-defined conditions(259.89)     muscle spasms in legs    Unspecified sleep apnea     in the past had gastric bypass       Past Surgical History:  Past Surgical History:   Procedure Laterality Date  HX CHOLECYSTECTOMY      HX GASTRIC BYPASS  2005    HX HYSTERECTOMY      HX KNEE REPLACEMENT      HX ORTHOPAEDIC      LCTR       Family History:  Family History   Problem Relation Age of Onset    Malignant Hyperthermia Neg Hx     Pseudocholinesterase Deficiency Neg Hx     Delayed Awakening Neg Hx     Post-op Nausea/Vomiting Neg Hx     Emergence Delirium Neg Hx     Post-op Cognitive Dysfunction Neg Hx     Other Neg Hx        Social History:  Social History     Tobacco Use    Smoking status: Never Smoker    Smokeless tobacco: Never Used   Substance Use Topics    Alcohol use: No     Alcohol/week: 0.0 standard drinks    Drug use: No       Allergies: Allergies   Allergen Reactions    Percocet [Oxycodone-Acetaminophen] Itching       Review of Systems   Review of Systems   Constitutional: Negative for chills and fever. Respiratory: Negative for cough and shortness of breath. Cardiovascular: Negative for chest pain. Gastrointestinal: Positive for abdominal pain, nausea and vomiting. Negative for diarrhea. Musculoskeletal: Negative for back pain and neck pain. Neurological: Positive for headaches. Negative for dizziness, weakness, light-headedness and numbness. All other systems reviewed and are negative. Physical Exam     Vitals:    01/14/22 1641 01/14/22 2006   BP: (!) 210/129 (!) 188/109   Pulse: 96 77   Resp: 16 15   Temp: 98 °F (36.7 °C)    SpO2: 97% 98%   Weight: 105.2 kg (232 lb)    Height: 5' 4\" (1.626 m)      Physical Exam  Vitals and nursing note reviewed. Constitutional:       Appearance: She is well-developed. Comments: Alert lying on stretcher in hunt bed nontoxic no active vomiting no acute distress   HENT:      Head: Normocephalic and atraumatic. Cardiovascular:      Rate and Rhythm: Normal rate and regular rhythm. Heart sounds: Normal heart sounds. No murmur heard. Pulmonary:      Effort: Pulmonary effort is normal. No respiratory distress.       Breath sounds: Normal breath sounds. No wheezing or rales. Abdominal:      General: Bowel sounds are normal.      Palpations: Abdomen is soft. Tenderness: There is abdominal tenderness. Musculoskeletal:      Cervical back: Normal range of motion and neck supple. Neurological:      Mental Status: She is alert and oriented to person, place, and time. Psychiatric:         Judgment: Judgment normal.         Diagnostic Study Results     Labs:     Recent Results (from the past 12 hour(s))   URINALYSIS W/ RFLX MICROSCOPIC    Collection Time: 01/14/22  6:10 PM   Result Value Ref Range    Color YELLOW      Appearance CLEAR      Specific gravity 1.015 1.005 - 1.030      pH (UA) 7.0 5.0 - 8.0      Protein Negative NEG mg/dL    Glucose Negative NEG mg/dL    Ketone Negative NEG mg/dL    Bilirubin Negative NEG      Blood Negative NEG      Urobilinogen 1.0 0.2 - 1.0 EU/dL    Nitrites Negative NEG      Leukocyte Esterase Negative NEG     CBC WITH AUTOMATED DIFF    Collection Time: 01/14/22  6:49 PM   Result Value Ref Range    WBC 7.9 4.6 - 13.2 K/uL    RBC 4.64 4.20 - 5.30 M/uL    HGB 11.5 (L) 12.0 - 16.0 g/dL    HCT 38.4 35.0 - 45.0 %    MCV 82.8 78.0 - 100.0 FL    MCH 24.8 24.0 - 34.0 PG    MCHC 29.9 (L) 31.0 - 37.0 g/dL    RDW 14.3 11.6 - 14.5 %    PLATELET 514 812 - 907 K/uL    MPV 10.0 9.2 - 11.8 FL    NRBC 0.0 0  WBC    ABSOLUTE NRBC 0.00 0.00 - 0.01 K/uL    NEUTROPHILS 69 40 - 73 %    LYMPHOCYTES 24 21 - 52 %    MONOCYTES 4 3 - 10 %    EOSINOPHILS 1 0 - 5 %    BASOPHILS 1 0 - 2 %    IMMATURE GRANULOCYTES 0 0.0 - 0.5 %    ABS. NEUTROPHILS 5.4 1.8 - 8.0 K/UL    ABS. LYMPHOCYTES 1.9 0.9 - 3.6 K/UL    ABS. MONOCYTES 0.3 0.05 - 1.2 K/UL    ABS. EOSINOPHILS 0.1 0.0 - 0.4 K/UL    ABS. BASOPHILS 0.1 0.0 - 0.1 K/UL    ABS. IMM.  GRANS. 0.0 0.00 - 0.04 K/UL    DF AUTOMATED     METABOLIC PANEL, COMPREHENSIVE    Collection Time: 01/14/22  6:49 PM   Result Value Ref Range    Sodium 143 136 - 145 mmol/L    Potassium 3.9 3.5 - 5.5 mmol/L    Chloride 110 100 - 111 mmol/L    CO2 27 21 - 32 mmol/L    Anion gap 6 3.0 - 18 mmol/L    Glucose 120 (H) 74 - 99 mg/dL    BUN 19 (H) 7.0 - 18 MG/DL    Creatinine 1.11 0.6 - 1.3 MG/DL    BUN/Creatinine ratio 17 12 - 20      GFR est AA >60 >60 ml/min/1.73m2    GFR est non-AA 51 (L) >60 ml/min/1.73m2    Calcium 9.2 8.5 - 10.1 MG/DL    Bilirubin, total 0.4 0.2 - 1.0 MG/DL    ALT (SGPT) 28 13 - 56 U/L    AST (SGOT) 23 10 - 38 U/L    Alk. phosphatase 162 (H) 45 - 117 U/L    Protein, total 7.4 6.4 - 8.2 g/dL    Albumin 3.8 3.4 - 5.0 g/dL    Globulin 3.6 2.0 - 4.0 g/dL    A-G Ratio 1.1 0.8 - 1.7     LIPASE    Collection Time: 01/14/22  6:49 PM   Result Value Ref Range    Lipase 295 73 - 393 U/L   TROPONIN-HIGH SENSITIVITY    Collection Time: 01/14/22  6:49 PM   Result Value Ref Range    Troponin-High Sensitivity 8 0 - 54 ng/L   EKG, 12 LEAD, INITIAL    Collection Time: 01/14/22  7:08 PM   Result Value Ref Range    Ventricular Rate 84 BPM    Atrial Rate 84 BPM    P-R Interval 162 ms    QRS Duration 86 ms    Q-T Interval 366 ms    QTC Calculation (Bezet) 432 ms    Calculated P Axis 66 degrees    Calculated R Axis 0 degrees    Calculated T Axis 32 degrees    Diagnosis       Normal sinus rhythm  Possible Left atrial enlargement  Left ventricular hypertrophy ( R in aVL , [place]Pleitez )  Nonspecific ST abnormality  Abnormal ECG  When compared with ECG of 06-JUL-2020 15:41,  No significant change was found         Radiologic Studies:   No orders to display     CT Results  (Last 48 hours)    None        CXR Results  (Last 48 hours)    None          Medical Decision Making   I am the first provider for this patient. I reviewed the vital signs, available nursing notes, past medical history, past surgical history, family history and social history. Vital Signs: Reviewed the patient's vital signs.     Pulse Oximetry Analysis: 97% on RA         EKG interpretation: (Preliminary)  6:32 PM   Sinus rhythm rate 84 bpm LVH nonspecific ST abnormality  EKG read by Hitesh Ayon MD at 1908     Records Reviewed: Nursing Notes and Old Medical Records    Procedures:  Procedures    ED Course:   6:32 PM Initial assessment performed. The patients presenting problems have been discussed, and they are in agreement with the care plan formulated and outlined with them. I have encouraged them to ask questions as they arise throughout their visit. Discussion:  Pt presents with nausea and vomiting that started earlier today after eating seafood at a restaurant. Patient does have a history of elevated blood pressure but does take blood pressure medication today but presents with elevated blood pressure reading. Labs within normal limits. No evidence of endorgan damage. Suspect gastroenteritis. Strict return precautions given, pt offering no questions or complaints. Diagnosis and Disposition     DISCHARGE NOTE:  Dina Dc's  results have been reviewed with her. She has been counseled regarding her diagnosis, treatment, and plan. She verbally conveys understanding and agreement of the signs, symptoms, diagnosis, treatment and prognosis and additionally agrees to follow up as discussed. She also agrees with the care-plan and conveys that all of her questions have been answered. I have also provided discharge instructions for her that include: educational information regarding their diagnosis and treatment, and list of reasons why they would want to return to the ED prior to their follow-up appointment, should her condition change. She has been provided with education for proper emergency department utilization. CLINICAL IMPRESSION:    1. Nausea and vomiting, intractability of vomiting not specified, unspecified vomiting type    2. Elevated blood pressure reading        PLAN:  1. D/C Home  2.    Discharge Medication List as of 1/14/2022  8:58 PM      START taking these medications    Details   ondansetron hcl (Zofran) 4 mg tablet Take 1 Tablet by mouth every eight (8) hours as needed for Nausea., Normal, Disp-15 Tablet, R-0         CONTINUE these medications which have NOT CHANGED    Details   QUEtiapine SR (SEROQUEL XR) 50 mg sr tablet Take 50 mg by mouth daily. Total dose 350 mg, Historical Med      zolpidem (AMBIEN) 10 mg tablet Take 10 mg by mouth nightly as needed for Sleep., Historical Med      ALPRAZolam (XANAX) 1 mg tablet Take 1 mg by mouth three (3) times daily as needed for Anxiety. , Historical Med      amLODIPine (NORVASC) 10 mg tablet Take 5 mg by mouth daily. , Historical Med, R-3           3. Follow-up Information     Follow up With Specialties Details Why Contact Info    Reji Alarcon NP Nurse Practitioner Schedule an appointment as soon as possible for a visit   [address] [place] [address] EMERGENCY DEPT Emergency Medicine  If symptoms worsen [address] [phone]                 Please note that this dictation was completed with [employer] the [employer] voice recognition software. Quite often unanticipated grammatical, syntax, homophones, and other interpretive errors are inadvertently transcribed by the computer software. Please disregard these errors. Please excuse any errors that have escaped final proofreading.

## 2022-01-15 LAB
ATRIAL RATE: 84 BPM
CALCULATED P AXIS, ECG09: 66 DEGREES
CALCULATED R AXIS, ECG10: 0 DEGREES
CALCULATED T AXIS, ECG11: 32 DEGREES
DIAGNOSIS, 93000: NORMAL
P-R INTERVAL, ECG05: 162 MS
Q-T INTERVAL, ECG07: 366 MS
QRS DURATION, ECG06: 86 MS
QTC CALCULATION (BEZET), ECG08: 432 MS
VENTRICULAR RATE, ECG03: 84 BPM

## 2022-02-18 ENCOUNTER — HOSPITAL ENCOUNTER (OUTPATIENT)
Dept: LAB | Age: 57
Discharge: HOME | End: 2022-02-18

## 2022-02-18 LAB — XX-LABCORP SPECIMEN COL,LCBCF: NORMAL

## 2022-02-18 PROCEDURE — 99001 SPECIMEN HANDLING PT-LAB: CPT

## 2022-03-18 PROBLEM — E86.0 DEHYDRATION: Status: ACTIVE | Noted: 2019-12-20

## 2022-03-19 PROBLEM — N17.9 ARF (ACUTE RENAL FAILURE) (HCC): Status: ACTIVE | Noted: 2019-12-20

## 2022-03-20 PROBLEM — R11.10 VOMITING AND DIARRHEA: Status: ACTIVE | Noted: 2019-12-20

## 2022-03-20 PROBLEM — R19.7 VOMITING AND DIARRHEA: Status: ACTIVE | Noted: 2019-12-20

## 2022-04-07 ENCOUNTER — HOSPITAL ENCOUNTER (EMERGENCY)
Age: 57
Discharge: HOME | End: 2022-04-08
Attending: EMERGENCY MEDICINE
Payer: MEDICARE

## 2022-04-07 DIAGNOSIS — R25.2 MUSCLE CRAMPING: ICD-10-CM

## 2022-04-07 DIAGNOSIS — N17.9 AKI (ACUTE KIDNEY INJURY) (HCC): Primary | ICD-10-CM

## 2022-04-07 DIAGNOSIS — E86.0 DEHYDRATION: ICD-10-CM

## 2022-04-07 LAB
ANION GAP SERPL CALC-SCNC: 5 MMOL/L (ref 3–18)
BASOPHILS # BLD: 0.1 K/UL (ref 0–0.1)
BASOPHILS NFR BLD: 1 % (ref 0–2)
BUN SERPL-MCNC: 33 MG/DL (ref 7–18)
BUN/CREAT SERPL: 21 (ref 12–20)
CALCIUM SERPL-MCNC: 8.8 MG/DL (ref 8.5–10.1)
CHLORIDE SERPL-SCNC: 108 MMOL/L (ref 100–111)
CK SERPL-CCNC: 367 U/L (ref 26–192)
CO2 SERPL-SCNC: 26 MMOL/L (ref 21–32)
CREAT SERPL-MCNC: 1.57 MG/DL (ref 0.6–1.3)
DIFFERENTIAL METHOD BLD: ABNORMAL
EOSINOPHIL # BLD: 0.2 K/UL (ref 0–0.4)
EOSINOPHIL NFR BLD: 2 % (ref 0–5)
ERYTHROCYTE [DISTWIDTH] IN BLOOD BY AUTOMATED COUNT: 15.3 % (ref 11.6–14.5)
GLUCOSE SERPL-MCNC: 108 MG/DL (ref 74–99)
HCT VFR BLD AUTO: 35.7 % (ref 35–45)
HGB BLD-MCNC: 11.1 G/DL (ref 12–16)
IMM GRANULOCYTES # BLD AUTO: 0 K/UL (ref 0–0.04)
IMM GRANULOCYTES NFR BLD AUTO: 0 % (ref 0–0.5)
LYMPHOCYTES # BLD: 2.9 K/UL (ref 0.9–3.6)
LYMPHOCYTES NFR BLD: 31 % (ref 21–52)
MAGNESIUM SERPL-MCNC: 3 MG/DL (ref 1.6–2.6)
MCH RBC QN AUTO: 26 PG (ref 24–34)
MCHC RBC AUTO-ENTMCNC: 31.1 G/DL (ref 31–37)
MCV RBC AUTO: 83.6 FL (ref 78–100)
MONOCYTES # BLD: 0.5 K/UL (ref 0.05–1.2)
MONOCYTES NFR BLD: 5 % (ref 3–10)
NEUTS SEG # BLD: 5.6 K/UL (ref 1.8–8)
NEUTS SEG NFR BLD: 61 % (ref 40–73)
NRBC # BLD: 0 K/UL (ref 0–0.01)
NRBC BLD-RTO: 0 PER 100 WBC
PLATELET # BLD AUTO: 353 K/UL (ref 135–420)
PMV BLD AUTO: 10.2 FL (ref 9.2–11.8)
POTASSIUM SERPL-SCNC: 4.3 MMOL/L (ref 3.5–5.5)
RBC # BLD AUTO: 4.27 M/UL (ref 4.2–5.3)
SODIUM SERPL-SCNC: 139 MMOL/L (ref 136–145)
WBC # BLD AUTO: 9.2 K/UL (ref 4.6–13.2)

## 2022-04-07 PROCEDURE — 99284 EMERGENCY DEPT VISIT MOD MDM: CPT

## 2022-04-07 PROCEDURE — 96360 HYDRATION IV INFUSION INIT: CPT

## 2022-04-07 PROCEDURE — 82550 ASSAY OF CK (CPK): CPT

## 2022-04-07 PROCEDURE — 85025 COMPLETE CBC W/AUTO DIFF WBC: CPT

## 2022-04-07 PROCEDURE — 74011250636 HC RX REV CODE- 250/636: Performed by: PHYSICIAN ASSISTANT

## 2022-04-07 PROCEDURE — 83735 ASSAY OF MAGNESIUM: CPT

## 2022-04-07 PROCEDURE — 80048 BASIC METABOLIC PNL TOTAL CA: CPT

## 2022-04-07 RX ADMIN — SODIUM CHLORIDE 500 ML: 900 INJECTION, SOLUTION INTRAVENOUS at 23:09

## 2022-04-08 VITALS
RESPIRATION RATE: 18 BRPM | HEIGHT: 64 IN | BODY MASS INDEX: 39.61 KG/M2 | HEART RATE: 79 BPM | TEMPERATURE: 97.6 F | SYSTOLIC BLOOD PRESSURE: 146 MMHG | OXYGEN SATURATION: 100 % | DIASTOLIC BLOOD PRESSURE: 82 MMHG | WEIGHT: 232 LBS

## 2022-04-08 NOTE — ED TRIAGE NOTES
Ambulatory patient c/o bilateral leg pain/muscle cramping/spasms that started around 5pm this evening. Patient reports in the past when this has happened, her potassium has been low.

## 2022-04-08 NOTE — ED PROVIDER NOTES
EMERGENCY DEPARTMENT HISTORY AND PHYSICAL EXAM    Date: 4/7/2022  Patient Name: Noah Clifton    History of Presenting Illness     Chief Complaint   Patient presents with    Leg Pain         History Provided By: Patient    11:08 PM  Noah Clifton is a 49 y.o. female with PMHX of hypertension, lumbar degenerative disc disease, anxiety, frequent muscle spasms who presents to the emergency department C/O bilateral lower extremity muscle cramping that began this evening. Patient reports occasional muscle spasms in her legs as well as chronic low back pain for which she takes meloxicam and tizanidine. She took that tonight without relief. She states she has had similar muscle spasms in her legs in the past when her potassium was low or was dehydrated. Pt denies injury, trauma, extremity numbness or weakness, saddle anesthesia, bowel or bladder incontinence, and any other sxs or complaints. PCP: Magda Garnett NP    Current Outpatient Medications   Medication Sig Dispense Refill    ondansetron hcl (Zofran) 4 mg tablet Take 1 Tablet by mouth every eight (8) hours as needed for Nausea. 15 Tablet 0    QUEtiapine SR (SEROQUEL XR) 50 mg sr tablet Take 50 mg by mouth daily. Total dose 350 mg      zolpidem (AMBIEN) 10 mg tablet Take 10 mg by mouth nightly as needed for Sleep.  ALPRAZolam (XANAX) 1 mg tablet Take 1 mg by mouth three (3) times daily as needed for Anxiety.  amLODIPine (NORVASC) 10 mg tablet Take 5 mg by mouth daily.   3       Past History     Past Medical History:  Past Medical History:   Diagnosis Date    Anxiety     Arthritis     Cancer ([facility] [address]     endometrial     Chronic pain     legs knees headaches    Coagulation defects     anemia    Depression     HX OTHER MEDICAL     muscle cramping    Hypertension     Other ill-defined conditions(799.89)     muscle spasms in legs    Unspecified sleep apnea     in the past had gastric bypass       Past Surgical History:  Past Surgical History:   Procedure Laterality Date    HX CHOLECYSTECTOMY      HX GASTRIC BYPASS  2005    HX HYSTERECTOMY      HX KNEE REPLACEMENT      HX ORTHOPAEDIC      LCTR       Family History:  Family History   Problem Relation Age of Onset    Malignant Hyperthermia Neg Hx     Pseudocholinesterase Deficiency Neg Hx     Delayed Awakening Neg Hx     Post-op Nausea/Vomiting Neg Hx     Emergence Delirium Neg Hx     Post-op Cognitive Dysfunction Neg Hx     Other Neg Hx        Social History:  Social History     Tobacco Use    Smoking status: Never Smoker    Smokeless tobacco: Never Used   Substance Use Topics    Alcohol use: No     Alcohol/week: 0.0 standard drinks    Drug use: No       Allergies: Allergies   Allergen Reactions    Percocet [Oxycodone-Acetaminophen] Itching         Review of Systems   Review of Systems   Constitutional: Negative for fever. Musculoskeletal: Positive for back pain and myalgias. Skin: Negative. Neurological: Negative for weakness and numbness. All other systems reviewed and are negative. Physical Exam     Vitals:    04/07/22 2237   BP: (!) 146/82   Pulse: 79   Resp: 18   Temp: 97.6 °F (36.4 °C)   SpO2: 100%   Weight: 105.2 kg (232 lb)   Height: 5' 4\" (1.626 m)     Physical Exam  Vital signs and nursing notes reviewed. CONSTITUTIONAL: Alert. Well-appearing; well-nourished; in no apparent distress. HEAD: Normocephalic; atraumatic. CV: Normal S1, S2; no murmurs, rubs, or gallops. No chest wall tenderness. RESPIRATORY: Normal chest excursion with respiration; breath sounds clear and equal bilaterally; no wheezes, rhonchi, or rales. BACK:  No evidence of trauma or deformity. Non-tender to palpation. FROM without difficulty. EXT: Normal ROM in all four extremities; non-tender to palpation. 2+DP pulses, no foot drop. SKIN: Normal for age and race; warm; dry; good turgor; no apparent lesions or exudate. NEURO: A & O x3. Motor 5/5 bilaterally. Sensation intact. PSYCH:  Mood and affect appropriate. Diagnostic Study Results     Labs -     Recent Results (from the past 12 hour(s))   CBC WITH AUTOMATED DIFF    Collection Time: 04/07/22 10:57 PM   Result Value Ref Range    WBC 9.2 4.6 - 13.2 K/uL    RBC 4.27 4.20 - 5.30 M/uL    HGB 11.1 (L) 12.0 - 16.0 g/dL    HCT 35.7 35.0 - 45.0 %    MCV 83.6 78.0 - 100.0 FL    MCH 26.0 24.0 - 34.0 PG    MCHC 31.1 31.0 - 37.0 g/dL    RDW 15.3 (H) 11.6 - 14.5 %    PLATELET 566 976 - 826 K/uL    MPV 10.2 9.2 - 11.8 FL    NRBC 0.0 0  WBC    ABSOLUTE NRBC 0.00 0.00 - 0.01 K/uL    NEUTROPHILS 61 40 - 73 %    LYMPHOCYTES 31 21 - 52 %    MONOCYTES 5 3 - 10 %    EOSINOPHILS 2 0 - 5 %    BASOPHILS 1 0 - 2 %    IMMATURE GRANULOCYTES 0 0.0 - 0.5 %    ABS. NEUTROPHILS 5.6 1.8 - 8.0 K/UL    ABS. LYMPHOCYTES 2.9 0.9 - 3.6 K/UL    ABS. MONOCYTES 0.5 0.05 - 1.2 K/UL    ABS. EOSINOPHILS 0.2 0.0 - 0.4 K/UL    ABS. BASOPHILS 0.1 0.0 - 0.1 K/UL    ABS. IMM.  GRANS. 0.0 0.00 - 0.04 K/UL    DF AUTOMATED     METABOLIC PANEL, BASIC    Collection Time: 04/07/22 10:57 PM   Result Value Ref Range    Sodium 139 136 - 145 mmol/L    Potassium 4.3 3.5 - 5.5 mmol/L    Chloride 108 100 - 111 mmol/L    CO2 26 21 - 32 mmol/L    Anion gap 5 3.0 - 18 mmol/L    Glucose 108 (H) 74 - 99 mg/dL    BUN 33 (H) 7.0 - 18 MG/DL    Creatinine 1.57 (H) 0.6 - 1.3 MG/DL    BUN/Creatinine ratio 21 (H) 12 - 20      GFR est AA 42 (L) >60 ml/min/1.73m2    GFR est non-AA 34 (L) >60 ml/min/1.73m2    Calcium 8.8 8.5 - 10.1 MG/DL   MAGNESIUM    Collection Time: 04/07/22 10:57 PM   Result Value Ref Range    Magnesium 3.0 (H) 1.6 - 2.6 mg/dL   CK    Collection Time: 04/07/22 10:57 PM   Result Value Ref Range     (H) 26 - 192 U/L       Radiologic Studies -   No orders to display     CT Results  (Last 48 hours)    None        CXR Results  (Last 48 hours)    None          Medications given in the ED-  Medications   sodium chloride 0.9 % bolus infusion 500 mL (500 mL IntraVENous New Bag 4/7/22 7717)         Medical Decision Making   I am the first provider for this patient. I reviewed the vital signs, available nursing notes, past medical history, past surgical history, family history and social history. Vital Signs-Reviewed the patient's vital signs. Records Reviewed: Nursing Notes and Old Medical Records      Procedures:  Procedures    ED Course:  11:08 PM   Initial assessment performed. The patients presenting problems have been discussed, and they are in agreement with the care plan formulated and outlined with them. I have encouraged them to ask questions as they arise throughout their visit. Provider Notes (Medical Decision Making): Emily Chappell is a 49 y.o. female presents with intermittent muscle cramping in various parts of both legs tonight. She has had similar symptoms with dehydration and hypokalemia in the past.  On arrival she appears in no distress, exam is normal, no obvious muscle spasm or pain now. She takes meloxicam and tizanidine as needed. Her vitals are stable, labs reveal a mild TIKA consistent with probable dehydration which has happened to her in the past.  Patient received IV fluids, appears comfortable and already has an appointment in 1 week to follow-up with PCP. Recommend recheck of her renal function at that time as well return ED if any worsening or changes symptoms. Diagnosis and Disposition       DISCHARGE NOTE:    Dina Dc's  results have been reviewed with her. She has been counseled regarding her diagnosis, treatment, and plan. She verbally conveys understanding and agreement of the signs, symptoms, diagnosis, treatment and prognosis and additionally agrees to follow up as discussed. She also agrees with the care-plan and conveys that all of her questions have been answered.   I have also provided discharge instructions for her that include: educational information regarding their diagnosis and treatment, and list of reasons why they would want to return to the ED prior to their follow-up appointment, should her condition change. She has been provided with education for proper emergency department utilization. CLINICAL IMPRESSION:    1. TIKA (acute kidney injury) ([facility] [address]    2. Dehydration    3. Muscle cramping        PLAN:  1. D/C Home  2. Current Discharge Medication List        3. Follow-up Information     Follow up With Specialties Details Why Contact Info    Calderon Hernandez NP Nurse Practitioner In 1 week to recheck your kidney function [address] [place] [address] [phone]      [facility] EMERGENCY DEPT Emergency Medicine  As needed, If symptoms worsen [address] [place] [id number]  [phone]        _______________________________      Please note that this dictation was completed with [email or web address], the computer voice recognition software. Quite often unanticipated grammatical, syntax, homophones, and other interpretive errors are inadvertently transcribed by the computer software. Please disregard these errors. Please excuse any errors that have escaped final proofreading.

## 2022-05-30 NOTE — PROGRESS NOTES
Body mass index is 30.9 kg/m².   complicated by HTN   ensure euthyroid as above   lost a lot of weight after gastric surgery  Monitor       Patient resolved from Transition of Care episode on 7/21/2020  Discussed COVID-19 related testing which was available at this time. Test results were negative. Patient informed of results, if available? yes     Patient/family has been provided the following resources and education related to COVID-19:                         Signs, symptoms and red flags related to COVID-19            CDC exposure and quarantine guidelines            [facility] exposure contact - [phone]            Contact for their local Department of Health                 Patient currently reports that the following symptoms have improved:  no symptoms. No further outreach scheduled with this CTN/ACM/LPN/HC/ MA. Episode of Care resolved. Patient has this CTN/ACM/LPN/HC/MA contact information if future needs arise.

## 2023-01-02 ENCOUNTER — HOSPITAL ENCOUNTER (EMERGENCY)
Age: 58
Discharge: HOME | End: 2023-01-02
Attending: EMERGENCY MEDICINE
Payer: MEDICARE

## 2023-01-02 ENCOUNTER — APPOINTMENT (OUTPATIENT)
Dept: GENERAL RADIOLOGY | Age: 58
End: 2023-01-02
Attending: PHYSICIAN ASSISTANT
Payer: MEDICARE

## 2023-01-02 VITALS
BODY MASS INDEX: 37.56 KG/M2 | RESPIRATION RATE: 16 BRPM | OXYGEN SATURATION: 97 % | SYSTOLIC BLOOD PRESSURE: 160 MMHG | DIASTOLIC BLOOD PRESSURE: 97 MMHG | HEIGHT: 64 IN | TEMPERATURE: 98.7 F | WEIGHT: 220 LBS | HEART RATE: 81 BPM

## 2023-01-02 DIAGNOSIS — U07.1 COVID-19: Primary | ICD-10-CM

## 2023-01-02 DIAGNOSIS — J18.9 PNEUMONIA OF LEFT LOWER LOBE DUE TO INFECTIOUS ORGANISM: ICD-10-CM

## 2023-01-02 LAB
ALBUMIN SERPL-MCNC: 3.5 G/DL (ref 3.4–5)
ALBUMIN/GLOB SERPL: 0.9 {RATIO} (ref 0.8–1.7)
ALP SERPL-CCNC: 134 U/L (ref 45–117)
ALT SERPL-CCNC: 31 U/L (ref 13–56)
ANION GAP SERPL CALC-SCNC: 4 MMOL/L (ref 3–18)
APPEARANCE UR: CLEAR
AST SERPL-CCNC: 36 U/L (ref 10–38)
BACTERIA URNS QL MICRO: ABNORMAL /HPF
BASOPHILS # BLD: 0 K/UL (ref 0–0.1)
BASOPHILS NFR BLD: 0 % (ref 0–2)
BILIRUB SERPL-MCNC: 0.6 MG/DL (ref 0.2–1)
BILIRUB UR QL: NEGATIVE
BNP SERPL-MCNC: 17 PG/ML (ref 0–900)
BUN SERPL-MCNC: 23 MG/DL (ref 7–18)
BUN/CREAT SERPL: 18 (ref 12–20)
CALCIUM SERPL-MCNC: 8.6 MG/DL (ref 8.5–10.1)
CHLORIDE SERPL-SCNC: 99 MMOL/L (ref 100–111)
CO2 SERPL-SCNC: 34 MMOL/L (ref 21–32)
COLOR UR: YELLOW
CREAT SERPL-MCNC: 1.31 MG/DL (ref 0.6–1.3)
DIFFERENTIAL METHOD BLD: ABNORMAL
EOSINOPHIL # BLD: 0 K/UL (ref 0–0.4)
EOSINOPHIL NFR BLD: 0 % (ref 0–5)
EPITH CASTS URNS QL MICRO: ABNORMAL /LPF (ref 0–5)
ERYTHROCYTE [DISTWIDTH] IN BLOOD BY AUTOMATED COUNT: 13.7 % (ref 11.6–14.5)
FLUAV RNA SPEC QL NAA+PROBE: NOT DETECTED
FLUBV RNA SPEC QL NAA+PROBE: NOT DETECTED
GLOBULIN SER CALC-MCNC: 3.9 G/DL (ref 2–4)
GLUCOSE SERPL-MCNC: 145 MG/DL (ref 74–99)
GLUCOSE UR STRIP.AUTO-MCNC: NEGATIVE MG/DL
HCT VFR BLD AUTO: 39.1 % (ref 35–45)
HGB BLD-MCNC: 12.3 G/DL (ref 12–16)
HGB UR QL STRIP: NEGATIVE
IMM GRANULOCYTES # BLD AUTO: 0 K/UL (ref 0–0.04)
IMM GRANULOCYTES NFR BLD AUTO: 0 % (ref 0–0.5)
KETONES UR QL STRIP.AUTO: ABNORMAL MG/DL
LACTATE SERPL-SCNC: 1.4 MMOL/L (ref 0.4–2)
LEUKOCYTE ESTERASE UR QL STRIP.AUTO: ABNORMAL
LYMPHOCYTES # BLD: 1.2 K/UL (ref 0.9–3.6)
LYMPHOCYTES NFR BLD: 38 % (ref 21–52)
MCH RBC QN AUTO: 25.5 PG (ref 24–34)
MCHC RBC AUTO-ENTMCNC: 31.5 G/DL (ref 31–37)
MCV RBC AUTO: 81 FL (ref 78–100)
MONOCYTES # BLD: 0.3 K/UL (ref 0.05–1.2)
MONOCYTES NFR BLD: 10 % (ref 3–10)
NEUTS SEG # BLD: 1.6 K/UL (ref 1.8–8)
NEUTS SEG NFR BLD: 52 % (ref 40–73)
NITRITE UR QL STRIP.AUTO: NEGATIVE
NRBC # BLD: 0 K/UL (ref 0–0.01)
NRBC BLD-RTO: 0 PER 100 WBC
PH UR STRIP: 6 [PH] (ref 5–8)
PLATELET # BLD AUTO: 215 K/UL (ref 135–420)
PMV BLD AUTO: 10.5 FL (ref 9.2–11.8)
POTASSIUM SERPL-SCNC: 3.3 MMOL/L (ref 3.5–5.5)
PROT SERPL-MCNC: 7.4 G/DL (ref 6.4–8.2)
PROT UR STRIP-MCNC: 30 MG/DL
RBC # BLD AUTO: 4.83 M/UL (ref 4.2–5.3)
RBC #/AREA URNS HPF: NEGATIVE /HPF (ref 0–5)
SARS-COV-2, COV2: DETECTED
SODIUM SERPL-SCNC: 137 MMOL/L (ref 136–145)
SP GR UR REFRACTOMETRY: 1.02 (ref 1–1.03)
TROPONIN-HIGH SENSITIVITY: 9 NG/L (ref 0–54)
UROBILINOGEN UR QL STRIP.AUTO: 1 EU/DL (ref 0.2–1)
WBC # BLD AUTO: 3.2 K/UL (ref 4.6–13.2)
WBC URNS QL MICRO: ABNORMAL /HPF (ref 0–5)

## 2023-01-02 PROCEDURE — 99285 EMERGENCY DEPT VISIT HI MDM: CPT

## 2023-01-02 PROCEDURE — 80053 COMPREHEN METABOLIC PANEL: CPT

## 2023-01-02 PROCEDURE — 85025 COMPLETE CBC W/AUTO DIFF WBC: CPT

## 2023-01-02 PROCEDURE — 81001 URINALYSIS AUTO W/SCOPE: CPT

## 2023-01-02 PROCEDURE — 93005 ELECTROCARDIOGRAM TRACING: CPT

## 2023-01-02 PROCEDURE — 83880 ASSAY OF NATRIURETIC PEPTIDE: CPT

## 2023-01-02 PROCEDURE — 74011250636 HC RX REV CODE- 250/636: Performed by: PHYSICIAN ASSISTANT

## 2023-01-02 PROCEDURE — 84484 ASSAY OF TROPONIN QUANT: CPT

## 2023-01-02 PROCEDURE — 96360 HYDRATION IV INFUSION INIT: CPT

## 2023-01-02 PROCEDURE — 83605 ASSAY OF LACTIC ACID: CPT

## 2023-01-02 PROCEDURE — 87636 SARSCOV2 & INF A&B AMP PRB: CPT

## 2023-01-02 PROCEDURE — 71045 X-RAY EXAM CHEST 1 VIEW: CPT

## 2023-01-02 RX ORDER — DOXYCYCLINE HYCLATE 100 MG
100 TABLET ORAL 2 TIMES DAILY
Qty: 14 TABLET | Refills: 0 | Status: SHIPPED | OUTPATIENT
Start: 2023-01-02 | End: 2023-01-09

## 2023-01-02 RX ADMIN — SODIUM CHLORIDE 1000 ML: 9 INJECTION, SOLUTION INTRAVENOUS at 13:27

## 2023-01-02 NOTE — DISCHARGE INSTRUCTIONS
Hydrated  Healthy diet  Multivitamin  Contagious precautions  Given you have been ill for more than 3 to 4 days, medication such as paxlovid is not indicated  Antibiotics have been prescribed as concern for left lobe pneumonia on exam.  Take medication as prescribed. Take with food. Take all the medication prescribed.   Your home medications  Return to ER if you develop any new or worsening symptoms, difficulty breathing or any new concerns

## 2023-01-02 NOTE — Clinical Note
[facility] [place]  [facility]  [address]  [facility] [id number] [phone]    Work/School Note    Date: 1/2/2023    To Whom It May concern:    aMrianna Alaniz was seen and treated today in the emergency room by the following provider(s):  Attending Provider: Sharri Romero MD  Physician Assistant: Haven Webb PA-C. Marianna Alaniz is excused from work/school on 01/02/23 and 01/03/23. She is medically clear to return to work/school on 1/4/2023.        Sincerely,          Tyler Roche PA-C

## 2023-01-02 NOTE — ED PROVIDER NOTES
EMERGENCY DEPARTMENT HISTORY & PHYSICAL EXAM    THE [facility] EMERGENCY DEPT  1/2/2023, 11:33 AM    Clinical Impression:  1. COVID-19    2. Pneumonia of left lower lobe due to infectious organism        Assessment/Differential Diagnosis:     Ddx viral illness, bacterial illness, sepsis, flu, COVID, cardiac event, pneumonia, dehydration, electrolyte abnormality all considered. ED Course:   Initial assessment performed. The patients presenting problems have been discussed, and they are in agreement with the care plan formulated and outlined with them. I have encouraged them to ask questions as they arise throughout their visit. Pt here with day 9 of generalized weakness, URI symptoms, cough, nausea, diarrhea, fatigue. Initial symptom ST, and myalgia. Now with generalized fatigue, continued myalgia, cough prod yellowish phlegm. Minimal \"chest pressure\" intermittent with rest over last few days. Exam with pt appearing ill, fatigued, nontoxic. Lungs with crackles heard LL base. Good air mvmt bilat. Will check labs, EKG, CXR, give IVF    EKG NSR 91  CXR neg  Labs with leukopenia, covid positive, stable increase in creatinine  Pt feeling better after IVF, vitals stable, outside window of paxlovid. Given rales on exam, will cover with doxycycline. Symptomatic care, contagious precautions and return precautions discussed. Medical Chart Review:  I have reviewed triage nursing documentation. Review of old medical records with the following pertinent information:       Disposition:  Home  in good condition. Chief Complaint   Patient presents with    Fatigue    Nausea     HPI:    The history is provided by patient. No  used. Paulina Kendrick is a 56 y.o. female presenting to the Emergency Department with complaints of not feeling well\". Patient states she started with symptoms on Twan day which included sore throat and myalgia.   She states since that time she is just not felt well. She has had myalgia, fatigue, sore throat, mild rhinorrhea, initially dry cough that now is productive of a light yellow phlegm. She has noticed some shortness of breath. She is also noticed some chest \"tightness\", intermittently over the past few days to week, mostly felt at rest.  She feels this may be associated with her cough but is unsure. No pain with deep inspiration. She has been nauseous with no abdominal pain or vomiting. Decreased appetite. She states she has had 2-3 loose watery stools a day over the last 2-week with no black or bloody stools. She has noticed no extremity swelling, rash, calf tenderness. She has not been vaccinated for COVID or flu  Patient does have history of hypertension      I have reviewed all PMHX, FMHX and Social Hx as entered into the medical record in the chart below using the Epic Template. Review of Systems:  Constitutional: neg for fever, chills. ENT:  positive for sore throat, positive for rhinorrhea, negative for ear pain  Respiratory:  positive for cough, + occasional shortness of breath  Cardiovascular:  + (intermittent/at rest) for chest pain  GI:  neg for abdominal pain. No n/v/d.  :  No dysuria, hematuria. No Flank pain. MSK: negative for arthralgias, positive for myalgias  Integumentary: no rashes, or skin trauma  Neurological: + for headaches  All other systems reviewed negative with exception of positives in ROS and HPI.     Past Medical History:  Past Medical History:   Diagnosis Date    Anxiety     Arthritis     Cancer ([facility] [address]     endometrial     Chronic pain     legs knees headaches    Coagulation defects     anemia    Depression     HX OTHER MEDICAL     muscle cramping    Hypertension     Other ill-defined conditions(799.89)     muscle spasms in legs    Unspecified sleep apnea     in the past had gastric bypass       Past Surgical History:  Past Surgical History:   Procedure Laterality Date    HX CHOLECYSTECTOMY      HX GASTRIC BYPASS  2005    HX HYSTERECTOMY      HX KNEE REPLACEMENT      HX ORTHOPAEDIC      [facility]       Family History:  Family History   Problem Relation Age of Onset    Malignant Hyperthermia Neg Hx     Pseudocholinesterase Deficiency Neg Hx     Delayed Awakening Neg Hx     Post-op Nausea/Vomiting Neg Hx     Emergence Delirium Neg Hx     Post-op Cognitive Dysfunction Neg Hx     Other Neg Hx        Social History:  Social History     Tobacco Use    Smoking status: Never    Smokeless tobacco: Never   Substance Use Topics    Alcohol use: No     Alcohol/week: 0.0 standard drinks    Drug use: No       Allergies: Allergies   Allergen Reactions    Percocet [Oxycodone-Acetaminophen] Itching       Vital Signs:  Vitals:    01/02/23 1018 01/02/23 1326 01/02/23 1333 01/02/23 1402   BP: (!) 139/102 (!) 147/97  (!) 160/97   Pulse: (!) 105 87  81   Resp: 14 14  16   Temp: 97.3 °F (36.3 °C) 98.7 °F (37.1 °C)     SpO2: 97% 97% 97% 97%   Weight: 99.8 kg (220 lb)      Height: 5' 4\" (1.626 m)        Physical Exam:  Vital Signs Reviewed. Nursing Notes Reviewed. Constitutional:  Well developed, well nourished patient. Appearance and behavior are age and situation appropriate. Ambulating normally. Nontoxic appearing. Appears fatigued, ill, but nontoxic. A&OX3. Head: Normocephalic, Atraumatic . No facial swelling . No rash. No pain over sinuses with percussion. Eyes: Visual acuity grossly normal by my exam. Conjunctiva clear,Sclera anicteric. PERRLA. Eyelids normal   ENT:hearing grossly intact, Canals normal, TMs normal. Nose patent, normal septum, clear congestion present. Throat clear. No lesions of oral mucosa. Neck:  supple, FROM , no nuchal rigidity. No adenopathy. No swelling   Lungs: No respiratory distress. Lungs left base rales heard. No wheeze, no rhonchi. Good airmvmt bilat. No pain with deep inspirations. +occasional cough on exam.  CV:  tachycardic, without murmur.    Neuro:  A&O, no obvious neuro deficit. Skin:  Warm, dry, no rash. Diagnostics:    Labs -     Recent Results (from the past 12 hour(s))   EKG, 12 LEAD, INITIAL    Collection Time: 01/02/23 11:59 AM   Result Value Ref Range    Ventricular Rate 91 BPM    Atrial Rate 91 BPM    P-R Interval 166 ms    QRS Duration 96 ms    Q-T Interval 378 ms    QTC Calculation (Bezet) 464 ms    Calculated P Axis 58 degrees    Calculated R Axis 11 degrees    Calculated T Axis 38 degrees    Diagnosis       Normal sinus rhythm  Moderate voltage criteria for LVH, may be normal variant ( R in aVL , [place]   product )  Borderline ECG  When compared with ECG of 14-JAN-2022 19:08,  No significant change was found     CBC WITH AUTOMATED DIFF    Collection Time: 01/02/23 12:00 PM   Result Value Ref Range    WBC 3.2 (L) 4.6 - 13.2 K/uL    RBC 4.83 4.20 - 5.30 M/uL    HGB 12.3 12.0 - 16.0 g/dL    HCT 39.1 35.0 - 45.0 %    MCV 81.0 78.0 - 100.0 FL    MCH 25.5 24.0 - 34.0 PG    MCHC 31.5 31.0 - 37.0 g/dL    RDW 13.7 11.6 - 14.5 %    PLATELET 009 476 - 421 K/uL    MPV 10.5 9.2 - 11.8 FL    NRBC 0.0 0  WBC    ABSOLUTE NRBC 0.00 0.00 - 0.01 K/uL    NEUTROPHILS 52 40 - 73 %    LYMPHOCYTES 38 21 - 52 %    MONOCYTES 10 3 - 10 %    EOSINOPHILS 0 0 - 5 %    BASOPHILS 0 0 - 2 %    IMMATURE GRANULOCYTES 0 0.0 - 0.5 %    ABS. NEUTROPHILS 1.6 (L) 1.8 - 8.0 K/UL    ABS. LYMPHOCYTES 1.2 0.9 - 3.6 K/UL    ABS. MONOCYTES 0.3 0.05 - 1.2 K/UL    ABS. EOSINOPHILS 0.0 0.0 - 0.4 K/UL    ABS. BASOPHILS 0.0 0.0 - 0.1 K/UL    ABS. IMM.  GRANS. 0.0 0.00 - 0.04 K/UL    DF AUTOMATED     METABOLIC PANEL, COMPREHENSIVE    Collection Time: 01/02/23 12:00 PM   Result Value Ref Range    Sodium 137 136 - 145 mmol/L    Potassium 3.3 (L) 3.5 - 5.5 mmol/L    Chloride 99 (L) 100 - 111 mmol/L    CO2 34 (H) 21 - 32 mmol/L    Anion gap 4 3.0 - 18 mmol/L    Glucose 145 (H) 74 - 99 mg/dL    BUN 23 (H) 7.0 - 18 MG/DL    Creatinine 1.31 (H) 0.6 - 1.3 MG/DL    BUN/Creatinine ratio 18 12 - 20      eGFR 48 (L) >60 ml/min/1.73m2    Calcium 8.6 8.5 - 10.1 MG/DL    Bilirubin, total 0.6 0.2 - 1.0 MG/DL    ALT (SGPT) 31 13 - 56 U/L    AST (SGOT) 36 10 - 38 U/L    Alk. phosphatase 134 (H) 45 - 117 U/L    Protein, total 7.4 6.4 - 8.2 g/dL    Albumin 3.5 3.4 - 5.0 g/dL    Globulin 3.9 2.0 - 4.0 g/dL    A-G Ratio 0.9 0.8 - 1.7     TROPONIN-HIGH SENSITIVITY    Collection Time: 01/02/23 12:00 PM   Result Value Ref Range    Troponin-High Sensitivity 9 0 - 54 ng/L   NT-PRO BNP    Collection Time: 01/02/23 12:00 PM   Result Value Ref Range    NT pro-BNP 17 0 - 900 PG/ML   LACTIC ACID    Collection Time: 01/02/23 12:00 PM   Result Value Ref Range    Lactic acid 1.4 0.4 - 2.0 MMOL/L   COVID-19 WITH INFLUENZA A/B    Collection Time: 01/02/23 12:20 PM   Result Value Ref Range    SARS-CoV-2 by PCR Detected (AA) NOTD      Influenza A by PCR Not detected NOTD      Influenza B by PCR Not detected NOTD     URINALYSIS W/ RFLX MICROSCOPIC    Collection Time: 01/02/23  1:20 PM   Result Value Ref Range    Color YELLOW      Appearance CLEAR      Specific gravity 1.023 1.005 - 1.030      pH (UA) 6.0 5.0 - 8.0      Protein 30 (A) NEG mg/dL    Glucose Negative NEG mg/dL    Ketone TRACE (A) NEG mg/dL    Bilirubin Negative NEG      Blood Negative NEG      Urobilinogen 1.0 0.2 - 1.0 EU/dL    Nitrites Negative NEG      Leukocyte Esterase TRACE (A) NEG     URINE MICROSCOPIC ONLY    Collection Time: 01/02/23  1:20 PM   Result Value Ref Range    WBC 0 to 3 0 - 5 /hpf    RBC Negative 0 - 5 /hpf    Epithelial cells 1+ 0 - 5 /lpf    Bacteria 2+ (A) NEG /hpf       Radiologic Studies -   XR CHEST PORT   Final Result      No active cardiopulmonary disease. CT Results  (Last 48 hours)      None          CXR Results  (Last 48 hours)                 01/02/23 1150  XR CHEST PORT Final result    Impression:      No active cardiopulmonary disease.        Narrative:  EXAM: CHEST RADIOGRAPH       CLINICAL INDICATION/HISTORY: cough, weakness     > Additional: None COMPARISON: 7/6/2020       TECHNIQUE: Portable frontal view of the chest       _______________       FINDINGS:       SUPPORT DEVICES: None. HEART AND MEDIASTINUM: Heart size is normal.       LUNGS AND PLEURAL SPACES: No focal consolidation, effusion, or pneumothorax. BONES AND SOFT TISSUES: Unremarkable.       _______________                   Medications given in the ED-  Medications   sodium chloride 0.9 % bolus infusion 1,000 mL (1,000 mL IntraVENous New Bag 1/2/23 1327)       Please note that this dictation was completed with [employer], the computer voice recognition software. Quite often unanticipated grammatical, syntax, homophones, and other interpretive errors are inadvertently transcribed by the computer software. Please disregard these errors. Please excuse any errors that have escaped final proofreading.

## 2023-01-03 LAB
ATRIAL RATE: 91 BPM
CALCULATED P AXIS, ECG09: 58 DEGREES
CALCULATED R AXIS, ECG10: 11 DEGREES
CALCULATED T AXIS, ECG11: 38 DEGREES
DIAGNOSIS, 93000: NORMAL
P-R INTERVAL, ECG05: 166 MS
Q-T INTERVAL, ECG07: 378 MS
QRS DURATION, ECG06: 96 MS
QTC CALCULATION (BEZET), ECG08: 464 MS
VENTRICULAR RATE, ECG03: 91 BPM

## 2023-02-06 NOTE — PROGRESS NOTES
3039- Received from ED, assessment completed per flow sheet.  Alert, oriented, NAD.    0537- Orthostatic vital signs completed:      Laying: HR- 103         NIBP- 126/77     Sitting: HR- 104          NIBP-  133/55     Standing: HR- 106      NIBP-  132/71 Spray Paint Text: The liquid nitrogen was applied to the skin utilizing a spray paint frosting technique. Render Post-Care Instructions In Note?: yes Post-Care Instructions: I reviewed with the patient in detail post-care instructions. Patient is to wear sunprotection, and avoid picking at any of the treated lesions. Pt may apply Vaseline to crusted or scabbing areas. Medical Necessity Clause: This procedure was medically necessary because the lesions that were treated were: Number Of Freeze-Thaw Cycles: 1 freeze-thaw cycle Duration Of Freeze Thaw-Cycle (Seconds): 5-10 Render Note In Bullet Format When Appropriate: No Consent: The patient's consent was obtained including but not limited to risks of crusting, scabbing, blistering, scarring, darker or lighter pigmentary change, recurrence, incomplete removal and infection. Detail Level: Detailed Medical Necessity Information: It is in your best interest to select a reason for this procedure from the list below. All of these items fulfill various CMS LCD requirements except the new and changing color options. Application Tool (Optional): Liquid Nitrogen Sprayer

## 2024-05-31 ENCOUNTER — HOSPITAL ENCOUNTER (OUTPATIENT)
Facility: HOSPITAL | Age: 59
Discharge: HOME | End: 2024-06-03

## 2024-05-31 ENCOUNTER — OFFICE VISIT (OUTPATIENT)
Age: 59
End: 2024-05-31
Payer: MEDICARE

## 2024-05-31 VITALS
HEIGHT: 64 IN | RESPIRATION RATE: 16 BRPM | WEIGHT: 204 LBS | HEART RATE: 68 BPM | SYSTOLIC BLOOD PRESSURE: 178 MMHG | OXYGEN SATURATION: 100 % | BODY MASS INDEX: 34.83 KG/M2 | DIASTOLIC BLOOD PRESSURE: 111 MMHG | TEMPERATURE: 97.4 F

## 2024-05-31 DIAGNOSIS — M33.20 POLYMYOSITIS (HCC): ICD-10-CM

## 2024-05-31 DIAGNOSIS — Z98.84 HISTORY OF GASTRIC BYPASS: ICD-10-CM

## 2024-05-31 DIAGNOSIS — C54.1 ENDOMETRIAL CANCER (HCC): ICD-10-CM

## 2024-05-31 DIAGNOSIS — E66.01 SEVERE OBESITY (BMI 35.0-39.9) WITH COMORBIDITY (HCC): Primary | ICD-10-CM

## 2024-05-31 DIAGNOSIS — G62.9 NEUROPATHY: ICD-10-CM

## 2024-05-31 DIAGNOSIS — G47.9 SLEEP DISORDER: ICD-10-CM

## 2024-05-31 DIAGNOSIS — K90.9 INTESTINAL MALABSORPTION, UNSPECIFIED TYPE: ICD-10-CM

## 2024-05-31 PROBLEM — R11.10 VOMITING AND DIARRHEA: Status: RESOLVED | Noted: 2019-12-20 | Resolved: 2024-05-31

## 2024-05-31 PROBLEM — N17.9 ARF (ACUTE RENAL FAILURE) (HCC): Status: RESOLVED | Noted: 2019-12-20 | Resolved: 2024-05-31

## 2024-05-31 PROBLEM — E86.0 DEHYDRATION: Status: RESOLVED | Noted: 2019-12-20 | Resolved: 2024-05-31

## 2024-05-31 PROBLEM — R19.7 VOMITING AND DIARRHEA: Status: RESOLVED | Noted: 2019-12-20 | Resolved: 2024-05-31

## 2024-05-31 LAB — LABCORP SPECIMEN COLLECTION: NORMAL

## 2024-05-31 PROCEDURE — 99204 OFFICE O/P NEW MOD 45 MIN: CPT | Performed by: SPECIALIST

## 2024-05-31 PROCEDURE — 99001 SPECIMEN HANDLING PT-LAB: CPT

## 2024-05-31 RX ORDER — FLUTICASONE PROPIONATE 50 MCG
2 SPRAY, SUSPENSION (ML) NASAL DAILY
COMMUNITY
Start: 2020-07-07

## 2024-05-31 RX ORDER — MINOXIDIL 2.5 MG/1
TABLET ORAL
COMMUNITY
Start: 2022-10-23

## 2024-05-31 RX ORDER — METOPROLOL SUCCINATE 50 MG/1
50 TABLET, EXTENDED RELEASE ORAL DAILY
COMMUNITY
Start: 2022-10-24

## 2024-05-31 RX ORDER — TRIAMCINOLONE ACETONIDE 1 MG/G
CREAM TOPICAL
COMMUNITY
Start: 2022-05-04

## 2024-05-31 RX ORDER — HYDROXYZINE HYDROCHLORIDE 25 MG/1
25 TABLET, FILM COATED ORAL DAILY
COMMUNITY
Start: 2022-09-09

## 2024-05-31 RX ORDER — PSEUDOEPHEDRINE HCL 30 MG
100 TABLET ORAL DAILY
COMMUNITY
Start: 2016-05-02

## 2024-05-31 RX ORDER — FERROUS SULFATE 325(65) MG
325 TABLET ORAL DAILY
COMMUNITY
Start: 2021-04-19

## 2024-05-31 RX ORDER — ASPIRIN 81 MG/1
81 TABLET ORAL
COMMUNITY

## 2024-05-31 RX ORDER — TIZANIDINE 4 MG/1
TABLET ORAL
COMMUNITY
Start: 2022-09-09

## 2024-05-31 RX ORDER — DAPSONE 75 MG/G
GEL TOPICAL DAILY
COMMUNITY
Start: 2023-01-16

## 2024-05-31 RX ORDER — MOMETASONE FUROATE 1 MG/G
OINTMENT TOPICAL DAILY
COMMUNITY
Start: 2024-03-26 | End: 2025-03-26

## 2024-05-31 RX ORDER — FUROSEMIDE 20 MG/1
20 TABLET ORAL DAILY
COMMUNITY
Start: 2021-04-19

## 2024-05-31 RX ORDER — SPIRONOLACTONE 50 MG/1
TABLET, FILM COATED ORAL
COMMUNITY

## 2024-05-31 RX ORDER — GABAPENTIN 400 MG/1
400 CAPSULE ORAL 2 TIMES DAILY
COMMUNITY
Start: 2024-05-14 | End: 2024-11-10

## 2024-05-31 NOTE — PROGRESS NOTES
Revision Surgery Consultation  Our Prior Patient - 2005 Gastric Bypass with essentially no follow-up    Subjective:     The patient is a 58 y.o. obese female with a Body mass index is 35.02 kg/m²..  The patient had a gastric bypass procedure done approximatly 19 years ago by this office.  her starting weight prior to surgery was 240 lbs.  she ultimately lost approximately 90 lbs with a subsequent weight regain of 52 lbs.  India Lobo notes that she had no issues in the immediate post-op phase and had no hospital readmissions in the remote post-op phase. she currently is having the following issues related to his health: worsening arthritic pains with weight regain. she is here today to discuss a possible revision of her gastric bypass because of weight regain and arthritic issues.    Her BP is elevated in the office today - she states she did not take her medication this AM.  She is having no S/S of hemodynamic instability in the office today.     All of their prior evaluations available by both their PCP's and specialists physicians have been reviewed today either in the Care Everywhere portal or scanned under the media tab.    I have spent a large portion of my initial consultation today reviewing the patients current dietary habits which have contributed to their health issues, weight regain and  their current obesity. They understand that generally speaking,  weight regain is  a function of resuming less that ideal dietary habits instead of being a procedural issue.    I have suggested to them personally a dietary regimen that they can initiate now to help with their status as it pertains to their weight.  They understand that the most important aspect of their journey through their weight loss endeavor will be their adherence to a new lifestyle of healthy eating behavior. They also understand that an adherence to an exercise program will not only help with weight loss but is ultimately important in weight

## 2024-05-31 NOTE — PATIENT INSTRUCTIONS
increase your chances of quitting for good.  Limit alcohol to 2 drinks a day for men and 1 drink a day for women. Too much alcohol can cause health problems.  If you have a BMI higher than 25  Your doctor may do other tests to check your risk for weight-related health problems. This may include measuring the distance around your waist. A waist measurement of more than 40 inches in men or 35 inches in women can increase the risk of weight-related health problems.  Talk with your doctor about steps you can take to stay healthy or improve your health. You may need to make lifestyle changes to lose weight and stay healthy, such as changing your diet and getting regular exercise.  If you have a BMI lower than 18.5  Your doctor may do other tests to check your risk for health problems.  Talk with your doctor about steps you can take to stay healthy or improve your health. You may need to make lifestyle changes to gain or maintain weight and stay healthy, such as getting more healthy foods in your diet and doing exercises to build muscle.  Where can you learn more?  Go to http://www.[employer].net/Care.compConnections.  Enter S176 in the search box to learn more about \"Body Mass Index: Care Instructions.\"  Current as of: June 26, 2018  Content Version: 11.8  © 1180-7447 [employer]. Care instructions adapted under license by [employer] (which disclaims liability or warranty for this information). If you have questions about a medical condition or this instruction, always ask your healthcare professional. [employer] disclaims any warranty or liability for your use of this information.

## 2024-06-01 LAB
25(OH)D3+25(OH)D2 SERPL-MCNC: 31.2 NG/ML (ref 30–100)
ALBUMIN SERPL-MCNC: 4.3 G/DL (ref 3.8–4.9)
ALBUMIN/GLOB SERPL: 1.6 {RATIO} (ref 1.2–2.2)
ALP SERPL-CCNC: 184 IU/L (ref 44–121)
ALT SERPL-CCNC: 17 IU/L (ref 0–32)
AST SERPL-CCNC: 22 IU/L (ref 0–40)
BASOPHILS # BLD AUTO: 0.1 X10E3/UL (ref 0–0.2)
BASOPHILS NFR BLD AUTO: 1 %
BILIRUB SERPL-MCNC: 0.3 MG/DL (ref 0–1.2)
BUN SERPL-MCNC: 26 MG/DL (ref 6–24)
BUN/CREAT SERPL: 23 (ref 9–23)
CALCIUM SERPL-MCNC: 9.2 MG/DL (ref 8.7–10.2)
CHLORIDE SERPL-SCNC: 104 MMOL/L (ref 96–106)
CO2 SERPL-SCNC: 25 MMOL/L (ref 20–29)
CREAT SERPL-MCNC: 1.14 MG/DL (ref 0.57–1)
EGFRCR SERPLBLD CKD-EPI 2021: 56 ML/MIN/1.73
EOSINOPHIL # BLD AUTO: 0.1 X10E3/UL (ref 0–0.4)
EOSINOPHIL NFR BLD AUTO: 3 %
ERYTHROCYTE [DISTWIDTH] IN BLOOD BY AUTOMATED COUNT: 14 % (ref 11.7–15.4)
FERRITIN SERPL-MCNC: 13 NG/ML (ref 15–150)
FOLATE SERPL-MCNC: 10.6 NG/ML
GLOBULIN SER CALC-MCNC: 2.7 G/DL (ref 1.5–4.5)
GLUCOSE SERPL-MCNC: 103 MG/DL (ref 70–99)
HCT VFR BLD AUTO: 34.9 % (ref 34–46.6)
HGB BLD-MCNC: 10.9 G/DL (ref 11.1–15.9)
IMM GRANULOCYTES # BLD AUTO: 0 X10E3/UL (ref 0–0.1)
IMM GRANULOCYTES NFR BLD AUTO: 0 %
IRON SERPL-MCNC: 31 UG/DL (ref 27–159)
LYMPHOCYTES # BLD AUTO: 2.1 X10E3/UL (ref 0.7–3.1)
LYMPHOCYTES NFR BLD AUTO: 36 %
MCH RBC QN AUTO: 24.3 PG (ref 26.6–33)
MCHC RBC AUTO-ENTMCNC: 31.2 G/DL (ref 31.5–35.7)
MCV RBC AUTO: 78 FL (ref 79–97)
MONOCYTES # BLD AUTO: 0.4 X10E3/UL (ref 0.1–0.9)
MONOCYTES NFR BLD AUTO: 6 %
NEUTROPHILS # BLD AUTO: 3.1 X10E3/UL (ref 1.4–7)
NEUTROPHILS NFR BLD AUTO: 54 %
PLATELET # BLD AUTO: 353 X10E3/UL (ref 150–450)
POTASSIUM SERPL-SCNC: 5.3 MMOL/L (ref 3.5–5.2)
PROT SERPL-MCNC: 7 G/DL (ref 6–8.5)
RBC # BLD AUTO: 4.48 X10E6/UL (ref 3.77–5.28)
SODIUM SERPL-SCNC: 142 MMOL/L (ref 134–144)
SPECIMEN STATUS REPORT: NORMAL
T4 FREE SERPL-MCNC: 1.27 NG/DL (ref 0.82–1.77)
TSH SERPL DL<=0.005 MIU/L-ACNC: 0.98 UIU/ML (ref 0.45–4.5)
VIT B12 SERPL-MCNC: 1113 PG/ML (ref 232–1245)
WBC # BLD AUTO: 5.7 X10E3/UL (ref 3.4–10.8)

## 2024-06-04 LAB — VIT B1 BLD-SCNC: 90 NMOL/L (ref 66.5–200)

## 2024-06-12 ENCOUNTER — CLINICAL DOCUMENTATION (OUTPATIENT)
Age: 59
End: 2024-06-12

## 2024-06-12 NOTE — PROGRESS NOTES
Pt had a gastric bypass via this office in 2005 with no follow-up.  She presented last month for a revision consult    Called from office phone at [phone] (9:03 min/sec long conversation)    Called in response to labs showing anemia and low ferritin (iron was low normal)    She is not taking a multivitamin correctly    She will ensure to obtain a good quality multivitamin that includes 18-60 mg of iron per day - and she will take 2 a day    She has an UGI next month to determine if she is a revision candidate or a band over bypass    She understands the need to repeat labs in 6 months

## 2024-07-03 ENCOUNTER — HOSPITAL ENCOUNTER (OUTPATIENT)
Facility: HOSPITAL | Age: 59
Discharge: HOME | End: 2024-07-06
Payer: MEDICARE

## 2024-07-03 ENCOUNTER — HOSPITAL ENCOUNTER (OUTPATIENT)
Facility: HOSPITAL | Age: 59
Setting detail: OUTPATIENT SURGERY
Discharge: HOME | End: 2024-07-06
Payer: MEDICARE

## 2024-07-03 VITALS
TEMPERATURE: 97.8 F | WEIGHT: 201.2 LBS | BODY MASS INDEX: 34.35 KG/M2 | RESPIRATION RATE: 18 BRPM | HEIGHT: 64 IN | SYSTOLIC BLOOD PRESSURE: 130 MMHG | HEART RATE: 96 BPM | DIASTOLIC BLOOD PRESSURE: 86 MMHG

## 2024-07-03 DIAGNOSIS — D50.9 IRON DEFICIENCY ANEMIA, UNSPECIFIED IRON DEFICIENCY ANEMIA TYPE: Primary | ICD-10-CM

## 2024-07-03 DIAGNOSIS — E66.01 MORBID OBESITY (HCC): ICD-10-CM

## 2024-07-03 PROCEDURE — 74240 X-RAY XM UPR GI TRC 1CNTRST: CPT

## 2024-07-03 PROCEDURE — 2500000003 HC RX 250 WO HCPCS: Performed by: SPECIALIST

## 2024-07-03 PROCEDURE — 74220 X-RAY XM ESOPHAGUS 1CNTRST: CPT

## 2024-07-03 RX ADMIN — BARIUM SULFATE 100 ML: 960 POWDER, FOR SUSPENSION ORAL at 13:07

## 2024-07-03 NOTE — PROCEDURES
[facility]    Upper GI Procedure Report      India Lobo    Medical Record Number:[id number]    1965    Date of Service - July 3, 2024    Pre-Op Diagnosis - patient is status post gastric bypass performed by this office 19 years ago with complaint of weight regain. They now present for UGI to assess their post surgical anatomy.    Post-Op Diagnosis -same    Procedure - UGI study with barium    Surgeon - Mike Martini MD    Assistant - None    Complications - None    Specimens - None    Implants - None    Estimate Blood Loss - None    Statement of Medical Necessity - Need for radiologic evaluation prior to further management of their care..    Procedure -the patient was brought to the fluoroscopy suite where they were given thin barium.  On swallowing the barium the patient was noted to have normal peristalsis of their esophagus with progressive flow into the distal esophagus.  Specific findings of the distal esophagus revealed that they did note have a hiatal hernia. Contrast flowed normally through the esophagus and into a properly sized gastric pouch without reflux or obstruction or signs of stricture. The pouch filled in a timely manner and emptied into the al limb without issue or hesitation. The anatomy was normal for the timeframe with no stricture or obstruction at the anastomosis or any other abnormality.  Given the findings of today's exam we will plan for a possible band over bypass.    Mike Martini MD

## 2024-08-20 ENCOUNTER — TELEPHONE (OUTPATIENT)
Facility: HOSPITAL | Age: 59
End: 2024-08-20

## 2024-08-20 NOTE — TELEPHONE ENCOUNTER
NO SHOW    Patient's Name: India Lobo  YOB: 1965    Patient did not show for their nutrition appointment on August 15, 2024.  This is patient's 1st missed class.    Contacted patient via phone.  Pt stated \"I had no idea\".  Rescheduled appointment for 8/21/24 at 1PM.  Patient informed reminder calls are not made for any nutrition-related appointments.    AIRAM REYES RD

## 2024-08-21 ENCOUNTER — HOSPITAL ENCOUNTER (OUTPATIENT)
Facility: HOSPITAL | Age: 59
Discharge: HOME | End: 2024-08-24

## 2024-08-22 VITALS — HEIGHT: 64 IN | WEIGHT: 206.5 LBS | BODY MASS INDEX: 35.26 KG/M2

## 2024-08-22 NOTE — PROGRESS NOTES
Medical Weight Loss Multi-Disciplinary Program    Patient's Name: India Lobo Age: 59 y.o.   YOB: 1965 Sex: female      Session #1. Pt attended in-person class.  Weight obtained in office.    Date: 8/21/2024    Vitals:    08/21/24 1605   Weight: 93.7 kg (206 lb 8 oz)   Height: 1.626 m (5' 4\")      Body mass index is 35.45 kg/m².     Pounds Lost since last month: N/A   Pounds Gained since last month: N/A    Starting Weight: *** lbs  Previous Month’s Weight: N/A  Overall Pounds Lost: *** lbs  Overall Pounds Gained: *** lbs      Class Guidelines    Guidelines are reviewed with patient at the start of every class.    1. Patient understands that weight loss trial classes must be consecutive.  Patient understands if they miss a class, it is their responsibility to contact me to reschedule class.  I will reach out to patient after their first no show.  2.  Patient understands the expectations that weight maintenance/weight loss is expected during the classes.  Failure to demonstrate changes may result in extension of weight loss trial, followed by returning to see the surgeon.  Patient understands that they CANNOT gain any weight during the weight loss trial.  Gaining weight will result in extension of weight loss trial.  3. Patient is also instructed to complete their labwork, psychological evaluation visit, and any other tests that the surgeon has used while they are working on their weight loss trial.  4.  Patient was instructed to bring their packet of nutrition education materials to every class and appointment.        Eating Habits and Behaviors    Today in class we reviewed the Key Diet Principles.  Patient was encouraged to consume 3 meals each day, and the timing the meals was reviewed.  Meal time behaviors that will help pt to be successful with their weight loss efforts were additionally reviewed.      We discussed the importance of drinking adequate amounts of fluids, recommending that

## 2024-09-10 ENCOUNTER — HOSPITAL ENCOUNTER (OUTPATIENT)
Facility: HOSPITAL | Age: 59
Discharge: HOME | End: 2024-09-13

## 2024-09-10 VITALS — WEIGHT: 210.6 LBS | BODY MASS INDEX: 35.96 KG/M2 | HEIGHT: 64 IN

## 2024-09-26 ENCOUNTER — OFFICE VISIT (OUTPATIENT)
Age: 59
End: 2024-09-26
Payer: MEDICARE

## 2024-09-26 VITALS
BODY MASS INDEX: 35.39 KG/M2 | TEMPERATURE: 97 F | HEIGHT: 64 IN | HEART RATE: 62 BPM | WEIGHT: 207.3 LBS | DIASTOLIC BLOOD PRESSURE: 116 MMHG | OXYGEN SATURATION: 100 % | SYSTOLIC BLOOD PRESSURE: 163 MMHG

## 2024-09-26 DIAGNOSIS — I10 ESSENTIAL HYPERTENSION: ICD-10-CM

## 2024-09-26 DIAGNOSIS — Z98.84 HISTORY OF GASTRIC BYPASS: ICD-10-CM

## 2024-09-26 DIAGNOSIS — E66.01 SEVERE OBESITY (BMI 35.0-39.9) WITH COMORBIDITY: Primary | ICD-10-CM

## 2024-09-26 DIAGNOSIS — R79.0 LOW FERRITIN: ICD-10-CM

## 2024-09-26 DIAGNOSIS — K90.9 INTESTINAL MALABSORPTION, UNSPECIFIED TYPE: ICD-10-CM

## 2024-09-26 PROCEDURE — 3077F SYST BP >= 140 MM HG: CPT | Performed by: NURSE PRACTITIONER

## 2024-09-26 PROCEDURE — 3080F DIAST BP >= 90 MM HG: CPT | Performed by: NURSE PRACTITIONER

## 2024-09-26 PROCEDURE — 99214 OFFICE O/P EST MOD 30 MIN: CPT | Performed by: NURSE PRACTITIONER

## 2024-09-26 RX ORDER — LANOLIN ALCOHOL/MO/W.PET/CERES
1000 CREAM (GRAM) TOPICAL DAILY
COMMUNITY

## 2024-09-26 RX ORDER — MULTIVITAMIN WITH IRON
1 TABLET ORAL DAILY
COMMUNITY

## 2024-10-02 ENCOUNTER — FOLLOWUP TELEPHONE ENCOUNTER (OUTPATIENT)
Age: 59
End: 2024-10-02

## 2024-10-02 ENCOUNTER — HOSPITAL ENCOUNTER (OUTPATIENT)
Facility: HOSPITAL | Age: 59
Discharge: HOME | End: 2024-10-05
Payer: MEDICARE

## 2024-10-02 DIAGNOSIS — M48.061 SPINAL STENOSIS AT L4-L5 LEVEL: ICD-10-CM

## 2024-10-02 PROCEDURE — 72148 MRI LUMBAR SPINE W/O DYE: CPT

## 2024-10-02 NOTE — TELEPHONE ENCOUNTER
Spoke with patient after receiving message she had questions about her vitamins.   Has started MVI with 27mg iron daily to help with anemia and low ferritin. Instructed to increase to BID

## 2024-11-13 ENCOUNTER — OFFICE VISIT (OUTPATIENT)
Age: 59
End: 2024-11-13
Payer: MEDICARE

## 2024-11-13 VITALS
OXYGEN SATURATION: 100 % | HEART RATE: 72 BPM | BODY MASS INDEX: 35.85 KG/M2 | DIASTOLIC BLOOD PRESSURE: 78 MMHG | HEIGHT: 64 IN | SYSTOLIC BLOOD PRESSURE: 138 MMHG | WEIGHT: 210 LBS | TEMPERATURE: 97.2 F

## 2024-11-13 DIAGNOSIS — Z98.84 HISTORY OF GASTRIC BYPASS: ICD-10-CM

## 2024-11-13 DIAGNOSIS — K90.9 INTESTINAL MALABSORPTION, UNSPECIFIED TYPE: Primary | ICD-10-CM

## 2024-11-13 DIAGNOSIS — E66.01 SEVERE OBESITY (BMI 35.0-39.9) WITH COMORBIDITY: ICD-10-CM

## 2024-11-13 DIAGNOSIS — R79.0 LOW FERRITIN: ICD-10-CM

## 2024-11-13 DIAGNOSIS — I10 ESSENTIAL HYPERTENSION: ICD-10-CM

## 2024-11-13 PROCEDURE — 3078F DIAST BP <80 MM HG: CPT | Performed by: SPECIALIST

## 2024-11-13 PROCEDURE — 3075F SYST BP GE 130 - 139MM HG: CPT | Performed by: SPECIALIST

## 2024-11-13 PROCEDURE — 99214 OFFICE O/P EST MOD 30 MIN: CPT | Performed by: SPECIALIST

## 2024-11-13 RX ORDER — ALBUTEROL SULFATE 90 UG/1
INHALANT RESPIRATORY (INHALATION)
COMMUNITY
Start: 2024-11-05

## 2024-11-13 RX ORDER — AMLODIPINE AND VALSARTAN 5; 320 MG/1; MG/1
TABLET ORAL
COMMUNITY
Start: 2024-11-05

## 2024-11-13 RX ORDER — QUETIAPINE FUMARATE 100 MG/1
TABLET, FILM COATED ORAL
COMMUNITY
Start: 2024-11-05

## 2024-11-13 NOTE — PROGRESS NOTES
facility-administered medications for this visit.     Allergies   Allergen Reactions    Duloxetine Hcl Palpitations    Oxycodone-Acetaminophen Itching    Atorvastatin Dizziness or Vertigo    Oxycodone Nausea Only and Hives    Pollen Extract Itching          Review of Systems:            General - No history or complaints of unexpected fever, chills, or weight loss  Head/Neck - No history or complaints of headache, diplopia, dysphagia, hearing loss  Cardiac - No history or complaints of chest pain, palpitations, murmur, or shortness of breath  Pulmonary - No history or complaints of shortness of breath, productive cough, hemoptysis  Gastrointestinal - No history or complaints of reflux,  abdominal pain, obstipation/constipation, blood per rectum  Genitourinary - No history or complaints of hematuria/dysuria, stress urinary incontinence symptoms, or renal lithiasis  Musculoskeletal - Severe joint pain in knees and back  Hematologic - No history or complaints of bleeding disorders, blood transfusions, sickle cell anemia  Neurologic - No history or complaints of  migraine headaches, seizure activity, syncopal episodes, TIA or stroke  Integumentary - No history or complaints of rashes, abnormal nevi, skin cancer  Gynecological - n/a           Objective:   /78 (Site: Right Upper Arm, Position: Sitting, Cuff Size: Large Adult)   Pulse 72   Temp 97.2 °F (36.2 °C)   Ht 1.626 m (5' 4\")   Wt 95.3 kg (210 lb)   SpO2 100%   BMI 36.05 kg/m²     Physical Examination: General appearance - alert, well appearing, and in no distress and oriented to person, place, and time  Mental status - alert, oriented to person, place, and time, normal mood, behavior, speech, dress, motor activity, and thought processes  Eyes - pupils equal and reactive, extraocular eye movements intact, sclera anicteric, left eye normal, right eye normal  Neck - supple, no significant adenopathy  Chest - clear to auscultation, no wheezes, rales or

## 2024-11-21 ENCOUNTER — PREP FOR PROCEDURE (OUTPATIENT)
Age: 59
End: 2024-11-21

## 2024-11-21 DIAGNOSIS — Z01.812 PRE-OPERATIVE LABORATORY EXAMINATION: ICD-10-CM

## 2024-11-21 DIAGNOSIS — E66.01 SEVERE OBESITY: ICD-10-CM

## 2024-11-21 DIAGNOSIS — I10 ESSENTIAL HYPERTENSION: ICD-10-CM

## 2024-11-21 DIAGNOSIS — E66.01 SEVERE OBESITY (BMI 35.0-39.9) WITH COMORBIDITY: ICD-10-CM

## 2024-11-21 DIAGNOSIS — E66.01 SEVERE OBESITY: Primary | ICD-10-CM

## 2024-11-26 ENCOUNTER — TELEPHONE (OUTPATIENT)
Age: 59
End: 2024-11-26

## 2024-11-26 NOTE — TELEPHONE ENCOUNTER
office will advise you during your pre-op appointment.    Birth Control:  Are you currently taking any form of birth control?  If so, are you taking the Depo Provera injection or oral birth control pills? No birth control      You need to stop taking it two weeks prior to surgery and can start back two weeks after surgery.  You will need to use another form of protection during this time, such as condoms to avoid pregancy.  If you have an IUD or implant as a form of birth control, it is okay, and you may continue.    Medication:  Have you had any significant changes to any medication? No changes

## 2024-12-06 ENCOUNTER — OFFICE VISIT (OUTPATIENT)
Age: 59
End: 2024-12-06
Payer: MEDICARE

## 2024-12-06 VITALS
OXYGEN SATURATION: 100 % | DIASTOLIC BLOOD PRESSURE: 79 MMHG | TEMPERATURE: 97.9 F | WEIGHT: 211.4 LBS | BODY MASS INDEX: 36.09 KG/M2 | SYSTOLIC BLOOD PRESSURE: 138 MMHG | RESPIRATION RATE: 16 BRPM | HEIGHT: 64 IN | HEART RATE: 79 BPM

## 2024-12-06 DIAGNOSIS — I10 HYPERTENSION, UNSPECIFIED TYPE: ICD-10-CM

## 2024-12-06 DIAGNOSIS — Z98.84 HISTORY OF GASTRIC BYPASS: ICD-10-CM

## 2024-12-06 DIAGNOSIS — K90.9 INTESTINAL MALABSORPTION, UNSPECIFIED TYPE: ICD-10-CM

## 2024-12-06 DIAGNOSIS — E66.01 SEVERE OBESITY (BMI 35.0-39.9) WITH COMORBIDITY: Primary | ICD-10-CM

## 2024-12-06 DIAGNOSIS — C54.1 ENDOMETRIAL CANCER (HCC): ICD-10-CM

## 2024-12-06 PROCEDURE — 3075F SYST BP GE 130 - 139MM HG: CPT | Performed by: SPECIALIST

## 2024-12-06 PROCEDURE — 99215 OFFICE O/P EST HI 40 MIN: CPT | Performed by: SPECIALIST

## 2024-12-06 PROCEDURE — 3078F DIAST BP <80 MM HG: CPT | Performed by: SPECIALIST

## 2024-12-06 RX ORDER — PREGABALIN 75 MG/1
CAPSULE ORAL
COMMUNITY
Start: 2024-11-29

## 2024-12-06 NOTE — PROGRESS NOTES
Lap Band over existing Gastric Bypass - History and Physical    Subjective:     The patient is a 59 y.o. obese female with a Body mass index is 36.29 kg/m²..   she presents now to review their work up to date to see if they are a candidate for surgery and whether or not to proceed with the previously requested procedure.      She had a gastric bypass done via this office in 2005 at 240 lbs with essentially no follow-up.    Bariatric comorbidities continue to include:   Patient Active Problem List   Diagnosis    Polymyositis (HCC)    History of gastric bypass    Intestinal malabsorption    Endometrial cancer (HCC)    Severe obesity (BMI 35.0-39.9) with comorbidity    Sleep disorder    Neuropathy    Essential hypertension    Low ferritin    Severe obesity    Body mass index 36.0-36.9, adult    Hypertension      They have been generally well prior to this visit and have had no recent significant illnesses. The patient has had no gastrointestinal issues that would preclude them from proceeding with the surgery they have chosen. India Lobo has recently tried a preoperative weight loss program  in addition to seeing a bariatric nutritionist preoperatively. We have discussed on at least one other occasion about the various types of surgical weight loss procedures and they have considered these options after our initial consultation. We have once again discussed these procedures in detail and they have now decided on a surgical procedure.  They present today to discuss this and confirm that their evaluation pre operatively is acceptable to continue with surgery.    The patient desires gastric banding for surgical weight loss.      The patients goal weight is 163lb. (this represents a BMI of 28)    These goals are consistent with expected outcomes of their desired operation.  her Medical goals are resolution of these health issues.      Past Medical History:   Diagnosis Date    Anxiety     Arthritis     Chronic pain

## 2024-12-06 NOTE — H&P (VIEW-ONLY)
obstipation/constipation, blood per rectum  Genitourinary - No history or complaints of hematuria/dysuria, stress urinary incontinence symptoms, or renal lithiasis  Musculoskeletal - history of complaints of joint pain  Hematologic - No history or complaints of bleeding disorders, blood transfusions, sickle cell anemia  Neurologic - No history or complaints of  migraine headaches, seizure activity, syncopal episodes, TIA or stroke  Integumentary - No history or complaints of rashes, abnormal nevi, skin cancer  Gynecological - unremarkable    Objective:     /79 (Site: Right Upper Arm, Position: Sitting, Cuff Size: Large Adult)   Pulse 79   Temp 97.9 °F (36.6 °C)   Resp 16   Ht 1.626 m (5' 4\")   Wt 95.9 kg (211 lb 6.4 oz)   SpO2 100%   BMI 36.29 kg/m²     Physical Examination: General appearance - alert, well appearing, and in no distress  Mental status - alert, oriented to person, place, and time  Eyes - pupils equal and reactive, extraocular eye movements intact  Ears - external ear canals normal  Nose - normal and patent, no erythema, discharge or polyps  Mouth - mucous membranes moist, pharynx normal without lesions  Neck - supple, no significant adenopathy  Lymphatics - no palpable lymphadenopathy, no hepatosplenomegaly  Chest - clear to auscultation, no wheezes, rales or rhonchi, symmetric air entry  Heart - normal rate, regular rhythm, normal S1, S2, no murmurs, rubs, clicks or gallops  Abdomen - soft, nontender, nondistended, no masses or organomegaly  Back exam - full range of motion, no tenderness, palpable spasm or pain on motion  Neurological - alert, oriented, normal speech, no focal findings or movement disorder noted  Musculoskeletal - no joint tenderness, deformity or swelling  Extremities - peripheral pulses normal, no pedal edema, no clubbing or cyanosis  Skin - normal coloration and turgor, no rashes, no suspicious skin lesions noted    Labs / Preoperative Evaluations:     No results  found for this or any previous visit (from the past 1008 hour(s)).    Assessment:     Morbid obesity with comorbidity    Plan:     Gastric band over existing gastric bypass    This is a 59 y.o. female with a BMI of Body mass index is 36.29 kg/m². and the weight-related co-morbidties as noted above. India meets the NIH criteria for bariatric surgery based upon the BMI of Body mass index is 36.29 kg/m². and the weight-related co-morbidties. India has elected laparoscopic adjustable gastric band as her intervention of choice for treatment of morbid obestiy through surgical means secondary to its safety profile, reversibility and decreases in operative risks over gastric bypass or sleeve gastrectomy.    In the office today, following India's history and physical examination, a 40 minute discussion regarding the anatomic alterations for the Laparoscopic Adjustable Gastric Band was undertaken. The dietary expectations and the patient and  dependent factors for success were thoroughly discussed, to include the need for frequent interval follow-up, rules of adjustable gastric band, need for periodic adjustment (4-6 in the first year) and long-term dietary changes associated with success. The possible complications of the adjustable gastric band  were also discussed, to include; death,DVT/PE (which are rare), band slip, erosion, pouch dilation, port malposition and infection.  Specific weight related outcomes for success were also discussed with an emphasis on careful and close follow-up with the first year.  The patient expressed an understanding of the above factors, and her questions were answered in their entirety.    In addition, the patient attended a 1.5 hour power point seminar regarding obesity, surgical weight loss including, adjustable gastric band, gastric bypass, and sleeve gastrectomy.  This discussion contrasted the different surgical techniques, mechanisms of actions and expected outcomes, and surgical and

## 2024-12-09 ENCOUNTER — HOSPITAL ENCOUNTER (OUTPATIENT)
Facility: HOSPITAL | Age: 59
Discharge: HOME | End: 2024-12-12

## 2024-12-09 NOTE — PROGRESS NOTES
CLINICAL NUTRITION PRE-OPERATIVE EDUCATION    Patient's Name: India Lobo Age: 59 y.o.   YOB: 1965 Sex: female       Education & Materials Provided - Post-op Binder to include:  Liquid Diet Shopping List   Supplemental Resource Guide: MVI, Vitamin B12, Calcium Citrate, Vitamin D, Vitamin B50, and Iron recommendations  Protein Supplement Information   Fluid Requirements/No Straws  No Caffeine or Carbonation   No Alcohol                               No Snacks or No Concentrated Sweets                                   Exercise Guidelines   Key Diet Principles                            Addressed Current Habits/Changes to Make   Patient was instructed on clear liquid diet guidelines to follow for one (1) day prior to surgery.  Patient has been educated on the liquid diet to begin day 2 post-op and continue for 2 weeks  Patient understands the timeline for diet progression and the need to remain on full liquid diet until advanced by provider and dietitian.               Summary:  Patient has completed the required visits with the Registered Dietitian.  During these nutrition visits, we focused on dietary changes, behavior modifications, and the importance of establishing an exercise routine.  The pre-op diet protocol that patient was prescribed emphasized low carbohydrate intake (less than 50 grams per day) and 60-80 grams (g) of protein per day.     At today's session, patient was educated on the post-op diet and vitamin/mineral protocols.  Patient understands the importance of keeping total fat and sugar intake to less than 3g per serving.  Patient is aware of the the timeline for the different stages of the post-op diet and is aware that they will be on a modified full liquid diet for 2 weeks post-op.  Patient understands that the body needs ~60-70 g/d protein.  During the liquid diet phase, patient will need a minimum of 60 g/d protein from supplemental shakes.  Once eating soft protein-rich

## 2024-12-10 ENCOUNTER — TELEPHONE (OUTPATIENT)
Age: 59
End: 2024-12-10

## 2024-12-10 RX ORDER — ONDANSETRON 8 MG/1
8 TABLET, ORALLY DISINTEGRATING ORAL EVERY 8 HOURS PRN
Qty: 30 TABLET | Refills: 0 | Status: SHIPPED | OUTPATIENT
Start: 2024-12-10

## 2024-12-10 RX ORDER — ENOXAPARIN SODIUM 100 MG/ML
INJECTION SUBCUTANEOUS
Qty: 28 EACH | Refills: 0 | Status: SHIPPED | OUTPATIENT
Start: 2024-12-13 | End: 2024-12-27

## 2024-12-10 NOTE — TELEPHONE ENCOUNTER
Patient attended bariatric surgery pre-operative education class yesterday and watched the pre-op pre-recorded video. Patient was given a bariatric binder of resources; form acknowledging receipt of said book was completed.  Lovenox injections and Zofran were sent to patient's pharmacy on file.  VM left informing patient of not starting these until after surgery.

## 2024-12-11 ENCOUNTER — HOSPITAL ENCOUNTER (OUTPATIENT)
Facility: HOSPITAL | Age: 59
Discharge: HOME | End: 2024-12-14
Payer: MEDICARE

## 2024-12-11 LAB
ALBUMIN SERPL-MCNC: 3.7 G/DL (ref 3.4–5)
ALBUMIN/GLOB SERPL: 1.1 (ref 0.8–1.7)
ALP SERPL-CCNC: 160 U/L (ref 45–117)
ALT SERPL-CCNC: 29 U/L (ref 13–56)
ANION GAP SERPL CALC-SCNC: 4 MMOL/L (ref 3–18)
AST SERPL-CCNC: 23 U/L (ref 10–38)
BASOPHILS # BLD: 0.1 K/UL (ref 0–0.1)
BASOPHILS NFR BLD: 1 % (ref 0–2)
BILIRUB SERPL-MCNC: 0.5 MG/DL (ref 0.2–1)
BUN SERPL-MCNC: 23 MG/DL (ref 7–18)
BUN/CREAT SERPL: 21 (ref 12–20)
CALCIUM SERPL-MCNC: 9.1 MG/DL (ref 8.5–10.1)
CHLORIDE SERPL-SCNC: 108 MMOL/L (ref 100–111)
CO2 SERPL-SCNC: 29 MMOL/L (ref 21–32)
CREAT SERPL-MCNC: 1.09 MG/DL (ref 0.6–1.3)
DIFFERENTIAL METHOD BLD: ABNORMAL
EOSINOPHIL # BLD: 0.2 K/UL (ref 0–0.4)
EOSINOPHIL NFR BLD: 3 % (ref 0–5)
ERYTHROCYTE [DISTWIDTH] IN BLOOD BY AUTOMATED COUNT: 15.4 % (ref 11.6–14.5)
GLOBULIN SER CALC-MCNC: 3.5 G/DL (ref 2–4)
GLUCOSE SERPL-MCNC: 115 MG/DL (ref 74–99)
HCG SERPL QL: NEGATIVE
HCT VFR BLD AUTO: 36.4 % (ref 35–45)
HGB BLD-MCNC: 11.1 G/DL (ref 12–16)
IMM GRANULOCYTES # BLD AUTO: 0 K/UL (ref 0–0.04)
IMM GRANULOCYTES NFR BLD AUTO: 0 % (ref 0–0.5)
LYMPHOCYTES # BLD: 2.8 K/UL (ref 0.9–3.6)
LYMPHOCYTES NFR BLD: 41 % (ref 21–52)
MCH RBC QN AUTO: 26.6 PG (ref 24–34)
MCHC RBC AUTO-ENTMCNC: 30.5 G/DL (ref 31–37)
MCV RBC AUTO: 87.1 FL (ref 78–100)
MONOCYTES # BLD: 0.5 K/UL (ref 0.05–1.2)
MONOCYTES NFR BLD: 8 % (ref 3–10)
NEUTS SEG # BLD: 3.3 K/UL (ref 1.8–8)
NEUTS SEG NFR BLD: 47 % (ref 40–73)
NRBC # BLD: 0 K/UL (ref 0–0.01)
NRBC BLD-RTO: 0 PER 100 WBC
PLATELET # BLD AUTO: 242 K/UL (ref 135–420)
PMV BLD AUTO: 10.1 FL (ref 9.2–11.8)
POTASSIUM SERPL-SCNC: 5.5 MMOL/L (ref 3.5–5.5)
PROT SERPL-MCNC: 7.2 G/DL (ref 6.4–8.2)
RBC # BLD AUTO: 4.18 M/UL (ref 4.2–5.3)
SODIUM SERPL-SCNC: 141 MMOL/L (ref 136–145)
WBC # BLD AUTO: 6.9 K/UL (ref 4.6–13.2)

## 2024-12-11 PROCEDURE — 85025 COMPLETE CBC W/AUTO DIFF WBC: CPT

## 2024-12-11 PROCEDURE — 84703 CHORIONIC GONADOTROPIN ASSAY: CPT

## 2024-12-11 PROCEDURE — 36415 COLL VENOUS BLD VENIPUNCTURE: CPT

## 2024-12-11 PROCEDURE — 80053 COMPREHEN METABOLIC PANEL: CPT

## 2024-12-12 ENCOUNTER — ANESTHESIA EVENT (OUTPATIENT)
Facility: HOSPITAL | Age: 59
End: 2024-12-12
Payer: MEDICARE

## 2024-12-12 ENCOUNTER — HOSPITAL ENCOUNTER (OUTPATIENT)
Facility: HOSPITAL | Age: 59
Setting detail: OUTPATIENT SURGERY
Discharge: HOME | End: 2024-12-12
Attending: SPECIALIST | Admitting: SPECIALIST
Payer: MEDICARE

## 2024-12-12 ENCOUNTER — ANESTHESIA (OUTPATIENT)
Facility: HOSPITAL | Age: 59
End: 2024-12-12
Payer: MEDICARE

## 2024-12-12 VITALS
DIASTOLIC BLOOD PRESSURE: 69 MMHG | RESPIRATION RATE: 16 BRPM | WEIGHT: 214.13 LBS | HEART RATE: 65 BPM | SYSTOLIC BLOOD PRESSURE: 118 MMHG | TEMPERATURE: 97.2 F | OXYGEN SATURATION: 97 % | HEIGHT: 64 IN | BODY MASS INDEX: 36.56 KG/M2

## 2024-12-12 DIAGNOSIS — G89.18 POST-OP PAIN: Primary | ICD-10-CM

## 2024-12-12 PROBLEM — K66.0 ADHESION OF DIAPHRAGM: Status: ACTIVE | Noted: 2024-12-12

## 2024-12-12 PROBLEM — K76.0 NAFLD (NONALCOHOLIC FATTY LIVER DISEASE): Status: ACTIVE | Noted: 2024-12-12

## 2024-12-12 PROBLEM — K29.30 SUPERFICIAL GASTRITIS WITHOUT HEMORRHAGE: Status: ACTIVE | Noted: 2024-12-12

## 2024-12-12 LAB
GLUCOSE BLD STRIP.AUTO-MCNC: 158 MG/DL (ref 70–110)
GLUCOSE BLD STRIP.AUTO-MCNC: 88 MG/DL (ref 70–110)

## 2024-12-12 PROCEDURE — 7100000000 HC PACU RECOVERY - FIRST 15 MIN: Performed by: SPECIALIST

## 2024-12-12 PROCEDURE — 6360000002 HC RX W HCPCS: Performed by: SPECIALIST

## 2024-12-12 PROCEDURE — 2580000003 HC RX 258: Performed by: SPECIALIST

## 2024-12-12 PROCEDURE — 2500000003 HC RX 250 WO HCPCS: Performed by: NURSE ANESTHETIST, CERTIFIED REGISTERED

## 2024-12-12 PROCEDURE — 6360000002 HC RX W HCPCS: Performed by: NURSE ANESTHETIST, CERTIFIED REGISTERED

## 2024-12-12 PROCEDURE — C1713 ANCHOR/SCREW BN/BN,TIS/BN: HCPCS | Performed by: SPECIALIST

## 2024-12-12 PROCEDURE — 47379 UNLISTED LAPS PX LIVER: CPT | Performed by: SPECIALIST

## 2024-12-12 PROCEDURE — 3700000001 HC ADD 15 MINUTES (ANESTHESIA): Performed by: SPECIALIST

## 2024-12-12 PROCEDURE — 88307 TISSUE EXAM BY PATHOLOGIST: CPT

## 2024-12-12 PROCEDURE — A4217 STERILE WATER/SALINE, 500 ML: HCPCS | Performed by: SPECIALIST

## 2024-12-12 PROCEDURE — 2720000010 HC SURG SUPPLY STERILE: Performed by: SPECIALIST

## 2024-12-12 PROCEDURE — 47100 WEDGE BIOPSY OF LIVER: CPT | Performed by: SPECIALIST

## 2024-12-12 PROCEDURE — 7100000001 HC PACU RECOVERY - ADDTL 15 MIN: Performed by: SPECIALIST

## 2024-12-12 PROCEDURE — 3600000005 HC SURGERY LEVEL 5 BASE: Performed by: SPECIALIST

## 2024-12-12 PROCEDURE — 43770 LAP PLACE GASTR ADJ DEVICE: CPT | Performed by: SPECIALIST

## 2024-12-12 PROCEDURE — 3600000015 HC SURGERY LEVEL 5 ADDTL 15MIN: Performed by: SPECIALIST

## 2024-12-12 PROCEDURE — 3700000000 HC ANESTHESIA ATTENDED CARE: Performed by: SPECIALIST

## 2024-12-12 PROCEDURE — 7100000010 HC PHASE II RECOVERY - FIRST 15 MIN: Performed by: SPECIALIST

## 2024-12-12 PROCEDURE — 88313 SPECIAL STAINS GROUP 2: CPT

## 2024-12-12 PROCEDURE — 82962 GLUCOSE BLOOD TEST: CPT

## 2024-12-12 PROCEDURE — 7100000011 HC PHASE II RECOVERY - ADDTL 15 MIN: Performed by: SPECIALIST

## 2024-12-12 PROCEDURE — 2709999900 HC NON-CHARGEABLE SUPPLY: Performed by: SPECIALIST

## 2024-12-12 PROCEDURE — C1889 IMPLANT/INSERT DEVICE, NOC: HCPCS | Performed by: SPECIALIST

## 2024-12-12 DEVICE — THE LAP-BAND AP ADJUSTABLE GASTRIC BANDING SYSTEM WITH RAPIDPORT EZ
Type: IMPLANTABLE DEVICE | Site: STOMACH | Status: FUNCTIONAL
Brand: LAP-BAND

## 2024-12-12 RX ORDER — IPRATROPIUM BROMIDE AND ALBUTEROL SULFATE 2.5; .5 MG/3ML; MG/3ML
1 SOLUTION RESPIRATORY (INHALATION)
Status: DISCONTINUED | OUTPATIENT
Start: 2024-12-12 | End: 2024-12-12 | Stop reason: HOSPADM

## 2024-12-12 RX ORDER — HYDROMORPHONE HYDROCHLORIDE 1 MG/ML
0.5 INJECTION, SOLUTION INTRAMUSCULAR; INTRAVENOUS; SUBCUTANEOUS EVERY 5 MIN PRN
Status: DISCONTINUED | OUTPATIENT
Start: 2024-12-12 | End: 2024-12-12 | Stop reason: HOSPADM

## 2024-12-12 RX ORDER — DIPHENHYDRAMINE HYDROCHLORIDE 50 MG/ML
12.5 INJECTION INTRAMUSCULAR; INTRAVENOUS
Status: DISCONTINUED | OUTPATIENT
Start: 2024-12-12 | End: 2024-12-12 | Stop reason: HOSPADM

## 2024-12-12 RX ORDER — MIDAZOLAM HYDROCHLORIDE 1 MG/ML
INJECTION, SOLUTION INTRAMUSCULAR; INTRAVENOUS
Status: DISCONTINUED | OUTPATIENT
Start: 2024-12-12 | End: 2024-12-12 | Stop reason: SDUPTHER

## 2024-12-12 RX ORDER — LIDOCAINE HYDROCHLORIDE 20 MG/ML
INJECTION, SOLUTION EPIDURAL; INFILTRATION; INTRACAUDAL; PERINEURAL
Status: DISCONTINUED | OUTPATIENT
Start: 2024-12-12 | End: 2024-12-12 | Stop reason: SDUPTHER

## 2024-12-12 RX ORDER — FENTANYL CITRATE 50 UG/ML
INJECTION, SOLUTION INTRAMUSCULAR; INTRAVENOUS
Status: DISCONTINUED | OUTPATIENT
Start: 2024-12-12 | End: 2024-12-12 | Stop reason: SDUPTHER

## 2024-12-12 RX ORDER — METOCLOPRAMIDE HYDROCHLORIDE 5 MG/ML
INJECTION INTRAMUSCULAR; INTRAVENOUS
Status: DISCONTINUED | OUTPATIENT
Start: 2024-12-12 | End: 2024-12-12 | Stop reason: SDUPTHER

## 2024-12-12 RX ORDER — SODIUM CHLORIDE 9 MG/ML
INJECTION, SOLUTION INTRAVENOUS PRN
Status: DISCONTINUED | OUTPATIENT
Start: 2024-12-12 | End: 2024-12-12 | Stop reason: HOSPADM

## 2024-12-12 RX ORDER — SODIUM CHLORIDE, SODIUM LACTATE, POTASSIUM CHLORIDE, CALCIUM CHLORIDE 600; 310; 30; 20 MG/100ML; MG/100ML; MG/100ML; MG/100ML
INJECTION, SOLUTION INTRAVENOUS CONTINUOUS
Status: DISCONTINUED | OUTPATIENT
Start: 2024-12-12 | End: 2024-12-12 | Stop reason: HOSPADM

## 2024-12-12 RX ORDER — ONDANSETRON 2 MG/ML
4 INJECTION INTRAMUSCULAR; INTRAVENOUS
Status: DISCONTINUED | OUTPATIENT
Start: 2024-12-12 | End: 2024-12-12 | Stop reason: HOSPADM

## 2024-12-12 RX ORDER — NALOXONE HYDROCHLORIDE 0.4 MG/ML
INJECTION, SOLUTION INTRAMUSCULAR; INTRAVENOUS; SUBCUTANEOUS PRN
Status: DISCONTINUED | OUTPATIENT
Start: 2024-12-12 | End: 2024-12-12 | Stop reason: HOSPADM

## 2024-12-12 RX ORDER — GLYCOPYRROLATE 0.2 MG/ML
INJECTION INTRAMUSCULAR; INTRAVENOUS
Status: DISCONTINUED | OUTPATIENT
Start: 2024-12-12 | End: 2024-12-12 | Stop reason: SDUPTHER

## 2024-12-12 RX ORDER — DROPERIDOL 2.5 MG/ML
0.62 INJECTION, SOLUTION INTRAMUSCULAR; INTRAVENOUS
Status: DISCONTINUED | OUTPATIENT
Start: 2024-12-12 | End: 2024-12-12 | Stop reason: HOSPADM

## 2024-12-12 RX ORDER — LABETALOL HYDROCHLORIDE 5 MG/ML
10 INJECTION, SOLUTION INTRAVENOUS
Status: DISCONTINUED | OUTPATIENT
Start: 2024-12-12 | End: 2024-12-12 | Stop reason: HOSPADM

## 2024-12-12 RX ORDER — SODIUM CHLORIDE 9 MG/ML
INJECTION, SOLUTION INTRAVENOUS CONTINUOUS
Status: DISCONTINUED | OUTPATIENT
Start: 2024-12-12 | End: 2024-12-12 | Stop reason: HOSPADM

## 2024-12-12 RX ORDER — SODIUM CHLORIDE 0.9 % (FLUSH) 0.9 %
5-40 SYRINGE (ML) INJECTION EVERY 12 HOURS SCHEDULED
Status: DISCONTINUED | OUTPATIENT
Start: 2024-12-12 | End: 2024-12-12 | Stop reason: HOSPADM

## 2024-12-12 RX ORDER — MEPERIDINE HYDROCHLORIDE 50 MG/ML
12.5 INJECTION INTRAMUSCULAR; INTRAVENOUS; SUBCUTANEOUS AS NEEDED
Status: DISCONTINUED | OUTPATIENT
Start: 2024-12-12 | End: 2024-12-12 | Stop reason: HOSPADM

## 2024-12-12 RX ORDER — SODIUM CHLORIDE 0.9 % (FLUSH) 0.9 %
5-40 SYRINGE (ML) INJECTION PRN
Status: DISCONTINUED | OUTPATIENT
Start: 2024-12-12 | End: 2024-12-12 | Stop reason: HOSPADM

## 2024-12-12 RX ORDER — BUPIVACAINE HYDROCHLORIDE 2.5 MG/ML
INJECTION, SOLUTION EPIDURAL; INFILTRATION; INTRACAUDAL PRN
Status: DISCONTINUED | OUTPATIENT
Start: 2024-12-12 | End: 2024-12-12 | Stop reason: ALTCHOICE

## 2024-12-12 RX ORDER — PROPOFOL 10 MG/ML
INJECTION, EMULSION INTRAVENOUS
Status: DISCONTINUED | OUTPATIENT
Start: 2024-12-12 | End: 2024-12-12 | Stop reason: SDUPTHER

## 2024-12-12 RX ORDER — ONDANSETRON 2 MG/ML
INJECTION INTRAMUSCULAR; INTRAVENOUS
Status: DISCONTINUED | OUTPATIENT
Start: 2024-12-12 | End: 2024-12-12 | Stop reason: SDUPTHER

## 2024-12-12 RX ORDER — KETOROLAC TROMETHAMINE 15 MG/ML
INJECTION, SOLUTION INTRAMUSCULAR; INTRAVENOUS
Status: DISCONTINUED | OUTPATIENT
Start: 2024-12-12 | End: 2024-12-12 | Stop reason: SDUPTHER

## 2024-12-12 RX ORDER — MAGNESIUM HYDROXIDE 1200 MG/15ML
LIQUID ORAL CONTINUOUS PRN
Status: COMPLETED | OUTPATIENT
Start: 2024-12-12 | End: 2024-12-12

## 2024-12-12 RX ORDER — FENTANYL CITRATE 50 UG/ML
25 INJECTION, SOLUTION INTRAMUSCULAR; INTRAVENOUS EVERY 5 MIN PRN
Status: DISCONTINUED | OUTPATIENT
Start: 2024-12-12 | End: 2024-12-12 | Stop reason: HOSPADM

## 2024-12-12 RX ORDER — ROCURONIUM BROMIDE 10 MG/ML
INJECTION, SOLUTION INTRAVENOUS
Status: DISCONTINUED | OUTPATIENT
Start: 2024-12-12 | End: 2024-12-12 | Stop reason: SDUPTHER

## 2024-12-12 RX ORDER — LABETALOL HYDROCHLORIDE 5 MG/ML
INJECTION, SOLUTION INTRAVENOUS
Status: DISCONTINUED | OUTPATIENT
Start: 2024-12-12 | End: 2024-12-12 | Stop reason: SDUPTHER

## 2024-12-12 RX ORDER — HYDROMORPHONE HYDROCHLORIDE 2 MG/1
2 TABLET ORAL EVERY 6 HOURS PRN
Qty: 28 TABLET | Refills: 0 | Status: SHIPPED | OUTPATIENT
Start: 2024-12-12 | End: 2024-12-19

## 2024-12-12 RX ADMIN — SUGAMMADEX 200 MG: 100 INJECTION, SOLUTION INTRAVENOUS at 10:08

## 2024-12-12 RX ADMIN — MIDAZOLAM 2 MG: 1 INJECTION INTRAMUSCULAR; INTRAVENOUS at 08:43

## 2024-12-12 RX ADMIN — KETOROLAC TROMETHAMINE 30 MG: 15 INJECTION, SOLUTION INTRAMUSCULAR; INTRAVENOUS at 10:06

## 2024-12-12 RX ADMIN — PROPOFOL 150 MG: 10 INJECTION, EMULSION INTRAVENOUS at 08:50

## 2024-12-12 RX ADMIN — ROCURONIUM BROMIDE 50 MG: 10 INJECTION, SOLUTION INTRAVENOUS at 08:50

## 2024-12-12 RX ADMIN — FENTANYL CITRATE 100 MCG: 50 INJECTION, SOLUTION INTRAMUSCULAR; INTRAVENOUS at 08:43

## 2024-12-12 RX ADMIN — HYDROMORPHONE HYDROCHLORIDE 0.5 MG: 1 INJECTION, SOLUTION INTRAMUSCULAR; INTRAVENOUS; SUBCUTANEOUS at 09:15

## 2024-12-12 RX ADMIN — SODIUM CHLORIDE: 900 INJECTION, SOLUTION INTRAVENOUS at 08:45

## 2024-12-12 RX ADMIN — LIDOCAINE HYDROCHLORIDE 100 MG: 20 INJECTION, SOLUTION EPIDURAL; INFILTRATION; INTRACAUDAL; PERINEURAL at 08:50

## 2024-12-12 RX ADMIN — HYDROMORPHONE HYDROCHLORIDE 0.5 MG: 1 INJECTION, SOLUTION INTRAMUSCULAR; INTRAVENOUS; SUBCUTANEOUS at 09:09

## 2024-12-12 RX ADMIN — WATER 2000 MG: 1 INJECTION INTRAMUSCULAR; INTRAVENOUS; SUBCUTANEOUS at 09:00

## 2024-12-12 RX ADMIN — METOCLOPRAMIDE 10 MG: 5 INJECTION, SOLUTION INTRAMUSCULAR; INTRAVENOUS at 10:06

## 2024-12-12 RX ADMIN — LABETALOL HYDROCHLORIDE 5 MG: 5 INJECTION, SOLUTION INTRAVENOUS at 09:27

## 2024-12-12 RX ADMIN — HYDROMORPHONE HYDROCHLORIDE 0.5 MG: 1 INJECTION, SOLUTION INTRAMUSCULAR; INTRAVENOUS; SUBCUTANEOUS at 10:12

## 2024-12-12 RX ADMIN — LABETALOL HYDROCHLORIDE 5 MG: 5 INJECTION, SOLUTION INTRAVENOUS at 09:37

## 2024-12-12 RX ADMIN — HYDROMORPHONE HYDROCHLORIDE 0.5 MG: 1 INJECTION, SOLUTION INTRAMUSCULAR; INTRAVENOUS; SUBCUTANEOUS at 10:17

## 2024-12-12 RX ADMIN — SODIUM CHLORIDE: 900 INJECTION, SOLUTION INTRAVENOUS at 08:41

## 2024-12-12 RX ADMIN — ONDANSETRON HYDROCHLORIDE 4 MG: 2 INJECTION INTRAMUSCULAR; INTRAVENOUS at 10:06

## 2024-12-12 RX ADMIN — GLYCOPYRROLATE 0.2 MG: 0.2 INJECTION, SOLUTION INTRAMUSCULAR; INTRAVENOUS at 08:43

## 2024-12-12 ASSESSMENT — PAIN SCALES - GENERAL
PAINLEVEL_OUTOF10: 0

## 2024-12-12 ASSESSMENT — PAIN - FUNCTIONAL ASSESSMENT
PAIN_FUNCTIONAL_ASSESSMENT: NONE - DENIES PAIN
PAIN_FUNCTIONAL_ASSESSMENT: 0-10
PAIN_FUNCTIONAL_ASSESSMENT: ACTIVITIES ARE NOT PREVENTED

## 2024-12-12 ASSESSMENT — PAIN DESCRIPTION - DESCRIPTORS: DESCRIPTORS: SHARP;DULL

## 2024-12-12 NOTE — PERIOP NOTE
TRANSFER - OUT REPORT:    Verbal report given to Shreya ISAACS  on India Lobo  being transferred to Phase II  for routine progression of patient care       Report consisted of patient's Situation, Background, Assessment and   Recommendations(SBAR).     Information from the following report(s) Adult Overview, Surgery Report, Intake/Output, and MAR was reviewed with the receiving nurse.           Lines:   Peripheral IV 12/12/24 Right Antecubital (Active)   Site Assessment Clean, dry & intact 12/12/24 1116   Line Status Infusing 12/12/24 1116   Line Care Connections checked and tightened 12/12/24 1116   Phlebitis Assessment No symptoms 12/12/24 1116   Infiltration Assessment 0 12/12/24 1116   Dressing Status Clean, dry & intact 12/12/24 1116   Dressing Type Transparent 12/12/24 1116        Opportunity for questions and clarification was provided.      Patient transported with:  Registered Nurse

## 2024-12-12 NOTE — BRIEF OP NOTE
Brief Postoperative Note      Patient: India Lobo  YOB: 1965  MRN: [id number]    Date of Procedure: 12/12/2024    Pre-Op Diagnosis Codes:      * Severe obesity [E66.01]     * Body mass index 36.0-36.9, adult [Z68.36]     * Hypertension [I10]    Post-Op Diagnosis: Same       Procedure(s):  PLACEMENT OF LAPAROSCOPIC GASTRIC BAND AND PORT OVER EXISTING GASTRIC BYPASS WITH LIVER WEDGE BIOPSY AND INTRAOPERATIVE ENDOSCOPY    Surgeon(s):  Mike Martini MD    Assistant:  Surgical Assistant: Caesar Caicedo  Physician Assistant: Dariusz Tolbert PA    Anesthesia: General    Estimated Blood Loss (mL): Minimal    Complications: None    Specimens:   ID Type Source Tests Collected by Time Destination   A : WEDGE LIVER BIOPSY Tissue Liver SURGICAL PATHOLOGY Mike Martini MD 12/12/2024 1001    B : POUCH BIOPSY Tissue Stomach SURGICAL PATHOLOGY Mike Martini MD 12/12/2024 1001        Implants:  Implant Name Type Inv. Item Serial No.  Lot No. LRB No. Used Action   SYSTEM GASTRIC BANDING STANDARD ADJUSTABLE [employer] LAP-BAND AP - [id number]  SYSTEM GASTRIC BANDING STANDARD ADJUSTABLE [employer] EZ LAP-BAND AP [id number] [employer] [facility]  N/A 1 Implanted         Drains: * No LDAs found *    Findings:  Infection Present At Time Of Surgery (PATOS) (choose all levels that have infection present):  No infection present  Other Findings: see dictation- #[id number]    Electronically signed by Mike Martini MD on 12/12/2024 at 10:14 AM

## 2024-12-12 NOTE — DISCHARGE INSTRUCTIONS
FOLLOW ALL DISCHARGE INSTRUCTIONS FROM DR. ROJAS  CONTACT DR. ROJAS OFFICE WITH ANY POST OP QUESTIONS OR CONCERNS  AMBULATE IN HOUSE  ICE PACK TO SURGICAL SITES   REST ELEVATED  TAKE PRESCRIPTIONS AS DIRECTED  DRINK PLENTY OF FLUIDS  DIET -  PER DR. ROJAS'S INSTRUCTIONS  SHOWER TOMORROW  AVOID HEAVY LIFTING,AVOID CONSTIPATION  FOLLOW UP WITH DR. ROJAS AS SCHEDULED         Laparoscopy: What to Expect at Home  Your Recovery  After laparoscopic surgery, you are likely to have pain for the next several days. You may have a low fever and feel tired and sick to your stomach. This is common. You should feel better after 1 to 2 weeks.  This care sheet gives you a general idea about how long it will take for you to recover. But each person recovers at a different pace. Follow the steps below to get better as quickly as possible.  How can you care for yourself at home?  Activity    Rest when you feel tired. Getting enough sleep will help you recover.     Try to walk each day. Start by walking a little more than you did the day before. Bit by bit, increase the amount you walk. Walking boosts blood flow and helps prevent pneumonia and constipation.     Avoid strenuous activities, such as bicycle riding, jogging, weight lifting, or aerobic exercise, until your doctor says it is okay.     Avoid lifting anything that would make you strain. This may include a child, heavy grocery bags and milk containers, a heavy briefcase or backpack, cat litter or dog food bags, or a vacuum .     You may also have some shoulder or back pain. This pain is caused by the gas your doctor used to inflate your belly to help see your organs better. The pain usually lasts about 1 or 2 days.     You may drive when you are no longer taking pain medicine and can quickly move your foot from the gas pedal to the brake. You must also be able to sit comfortably for a long period of time, even if you do not plan to go far. You might get

## 2024-12-12 NOTE — PERIOP NOTE
TRANSFER - IN REPORT:    Verbal report received from LITZY Harper RN on India Lobo  being received from PACU for routine post-op      Report consisted of patient's Situation, Background, Assessment and   Recommendations(SBAR).     Information from the following report(s) Nurse Handoff Report, Adult Overview, Surgery Report, Intake/Output, and MAR was reviewed with the receiving nurse.    Opportunity for questions and clarification was provided.      Assessment completed upon patient's arrival to unit and care assumed.

## 2024-12-12 NOTE — PERIOP NOTE
Reviewed PTA medication list with patient/caregiver and patient/caregiver denies any additional medications.     Patient admits to having a responsible adult care for them at home for at least 24 hours after surgery.    Patient encouraged to use gown warming system and informed that using said warming gown to regulate body temperature prior to a procedure has been shown to help reduce the risks of blood clots and infection.    Patient's pharmacy of choice verified and documented in PTA medication section.    Dual skin assessment & fall risk band verification completed with FERNY Li.

## 2024-12-12 NOTE — OP NOTE
[address]  [address]                            OPERATIVE REPORT      PATIENT NAME: LILY GUAJARDO             : 1965  MED REC NO: [id number]                       ROOM: OR  ACCOUNT NO: [id number]                       ADMIT DATE: 2024  PROVIDER: Mike Martini MD    DATE OF SERVICE:  2024    PREOPERATIVE DIAGNOSES:  Persistent severe obesity with comorbid conditions, status post gastric bypass procedure.    POSTOPERATIVE DIAGNOSES:       1. Persistent severe obesity with comorbid conditions, status post gastric bypass procedure with massive intraabdominal adhesions near the diaphragm of unclear etiology.     2. Fatty infiltration of liver.    PROCEDURES PERFORMED:       1. Laparoscopic lysis of adhesions of 40 minutes duration.     2. Placement of lap-band with lap-band APS system.     3. Wedge liver biopsy.     4. Endoscopy to delineate anatomy.    SURGEON:  Mike Martini MD    ASSISTANT:  JESSICA Martin.  JESSICA Tolbert assisted with procedure since no qualified surgeons, interns, or residents available.  He assisted with the exposure during procedure, massive adhesiolysis, placement of lap-band and port, closure of skin and fascial incisions.    ANESTHESIA:  General endotracheal.    ESTIMATED BLOOD LOSS:  Less than 2 mL.    SPECIMENS REMOVED:  Wedge liver biopsy specimen.    INTRAOPERATIVE FINDINGS:  see dictation     COMPLICATIONS:  None.    IMPLANTS:  none    INDICATIONS:  Statement of medical necessity:  The patient had a gastric bypass procedure many years ago.  She lost weight very well initially, but then has had some weight regain over time and some recurrence of her comorbid conditions.  She presented to us for consultation for revision surgery.  We assessed her with upper GI study.  The upper GI study showed a very small gastric pouch.  We gave her some information on the procedure of lap-band

## 2024-12-12 NOTE — INTERVAL H&P NOTE
Update History & Physical    The patient's History and Physical of December 6, 2024 was reviewed with the patient and I examined the patient. There was no change. The surgical site was confirmed by the patient and me.     Plan: The risks, benefits, expected outcome, and alternative to the recommended procedure have been discussed with the patient. Patient understands and wants to proceed with the procedure.     Electronically signed by Mike Martini MD on 12/12/2024 at 7:16 AM

## 2024-12-12 NOTE — PERIOP NOTE
Arrived to Phase 2 alert but drowsy - assisted to recliner - denies c/o pain, pt states does not know where information from office is since she recently moved.  Pt asking \" can I drive my client to her appointment this afternoon\" - pt informed she had General anesthesia - so no driving, making important decisions or being left alone x 24 hours. Dr. Martini's office - Sasha notified about pt not having access to information from office

## 2024-12-12 NOTE — ANESTHESIA PRE PROCEDURE
Department of Anesthesiology  Preprocedure Note       Name:  India Lobo   Age:  59 y.o.  :  1965                                          MRN:  [id number]         Date:  2024      Surgeon: Surgeon(s):  Mike Martini MD    Procedure: Procedure(s):  LAPAROSCOPIC ADJUSTABLE BAND OVER GASTRIC BYPASS    Medications prior to admission:   Prior to Admission medications    Medication Sig Start Date End Date Taking? Authorizing Provider   vitamin B-12 (CYANOCOBALAMIN) 1000 MCG tablet Take 1 tablet by mouth daily   Yes Provider, MD Nathalia   diclofenac sodium (VOLTAREN) 1 % GEL Apply topically 4 times daily 22  Yes Provider, MD Nathalia   docusate (COLACE, DULCOLAX) 100 MG CAPS Take 100 mg by mouth daily 16  Yes Provider, MD Nathalia   fluticasone (FLONASE) 50 MCG/ACT nasal spray 2 sprays by Nasal route daily 20  Yes Provider, MD Nathalia   furosemide (LASIX) 20 MG tablet Take 1 tablet by mouth daily Indications: swelling 21  Yes Provider, MD Nathalia   linaclotide (LINZESS) 145 MCG capsule Take 1 capsule by mouth every morning (before breakfast) Takes as needed 6/30/15  Yes Provider, MD Nathalia   metoprolol succinate (TOPROL XL) 50 MG extended release tablet Take 1 tablet by mouth daily 10/24/22  Yes Provider, MD Nathalia   minoxidil (LONITEN) 2.5 MG tablet TAKE 2 TABLETS(5 MG) BY MOUTH TWICE DAILY 10/23/22  Yes Provider, MD Nathalia   mometasone (ELOCON) 0.1 % ointment Apply topically daily 3/26/24 3/26/25 Yes Provider, MD Nathalia   mupirocin (BACTROBAN) 2 % ointment APPLY TOPICALLY TO THE AFFECTED AREA THREE TIMES DAILY FOR 10 DAYS 10/20/22  Yes Provider, MD Nathalia   tiZANidine (ZANAFLEX) 4 MG tablet  22  Yes Provider, MD Nathalia   triamcinolone (KENALOG) 0.1 % cream APPLY TOPICALLY TO THE AFFECTED AREA TWICE DAILY UNTIL CLEAR 22  Yes Provider, MD Nathalia   ALPRAZolam (XANAX) 1 MG tablet Take 1 tablet by mouth 3 times daily as

## 2024-12-12 NOTE — ANESTHESIA POSTPROCEDURE EVALUATION
Post-Anesthesia Evaluation and Assessment    Cardiovascular Function/Vital Signs  Visit Vitals  /78   Pulse 77   Temp 97.5 °F (36.4 °C)   Resp 11   Ht 1.626 m (5' 4\")   Wt 97.1 kg (214 lb 2 oz)   SpO2 94%   BMI 36.75 kg/m²       Patient is status post Procedure(s):  PLACEMENT OF LAPAROSCOPIC GASTRIC BAND AND PORT OVER EXISTING GASTRIC BYPASS WITH LIVER WEDGE BIOPSY AND INTRAOPERATIVE ENDOSCOPY, LYSIS OF ADHESIONS FOR 40 MINS.    Nausea/Vomiting: Controlled.    Postoperative hydration reviewed and adequate.    Pain:      Managed.    Neurological Status:       At baseline.    Mental Status and Level of Consciousness: Arousable.    Pulmonary Status:       Adequate oxygenation and airway patent.    Complications related to anesthesia: None    Patient has met all discharge requirements.    Signed By: Daniel Tran MD    December 12, 2024

## 2024-12-12 NOTE — PERIOP NOTE
TRANSFER - IN REPORT:    Verbal report received from Lorrie Koehler CRNA, RN on India Lobo  being received from OR for routine progression of patient care      Report consisted of patient's Situation, Background, Assessment and   Recommendations(SBAR).     Information from the following report(s) Surgery Report, Intake/Output, MAR, and Recent Results was reviewed with the receiving nurse.    Opportunity for questions and clarification was provided.      Assessment completed upon patient's arrival to unit and care assumed.

## 2024-12-13 ENCOUNTER — TELEPHONE (OUTPATIENT)
Age: 59
End: 2024-12-13

## 2024-12-13 NOTE — TELEPHONE ENCOUNTER
RN left a VM message in an attempt to speak with patient post-operatively.  RN requested a return call.

## 2024-12-16 ENCOUNTER — TELEPHONE (OUTPATIENT)
Age: 59
End: 2024-12-16

## 2024-12-16 NOTE — TELEPHONE ENCOUNTER
This RN spoke with patient post-operatively.     Regular diet: Patient has started eating a regular diet and tolerating well.  She \"got hungry and started eating.\"    Nausea and/or vomitting: None    Pain: Currently managed with Percocet     Lovenox injections: Administering every 12 hours, rotating sites.  RN reminded patient to complete all injections, in which patient verbalized understanding.     Lap sites: No erythema, drainage, and/or swelling    BM: None to date, but is passing flatus.     Ambulation: Patient is walking throughout house every hour.    IS: Patient continues to use 10x's per hour while awake.  She has reached 2,000 mL.    Temperature: Patient is not monitoring.  RN educated her as to the importance of doing so daily and when to contact the office.  She verbalized understanding.    Medications: (confirmed currently taking)   *Probiotic: yes   *Prilosec: No, but will start taking today.    Questions: None    This RN reminded the patient to contact the office with any questions and/or concerns.  Patient verbalized understanding.  Patient's two week post-op visit is scheduled and was confirmed.

## 2024-12-18 ENCOUNTER — TELEPHONE (OUTPATIENT)
Age: 59
End: 2024-12-18

## 2024-12-18 ENCOUNTER — HOSPITAL ENCOUNTER (OUTPATIENT)
Facility: HOSPITAL | Age: 59
Setting detail: OUTPATIENT SURGERY
Discharge: HOME | End: 2024-12-21
Payer: MEDICARE

## 2024-12-18 ENCOUNTER — HOSPITAL ENCOUNTER (OUTPATIENT)
Facility: HOSPITAL | Age: 59
Discharge: HOME | End: 2024-12-21
Payer: MEDICARE

## 2024-12-18 VITALS
DIASTOLIC BLOOD PRESSURE: 95 MMHG | HEART RATE: 73 BPM | RESPIRATION RATE: 16 BRPM | TEMPERATURE: 98.2 F | OXYGEN SATURATION: 99 % | SYSTOLIC BLOOD PRESSURE: 169 MMHG

## 2024-12-18 DIAGNOSIS — E66.01 MORBID OBESITY: ICD-10-CM

## 2024-12-18 PROCEDURE — 74240 X-RAY XM UPR GI TRC 1CNTRST: CPT

## 2024-12-18 PROCEDURE — 2500000003 HC RX 250 WO HCPCS: Performed by: SPECIALIST

## 2024-12-18 PROCEDURE — 74240 X-RAY XM UPR GI TRC 1CNTRST: CPT | Performed by: SPECIALIST

## 2024-12-18 PROCEDURE — 74220 X-RAY XM ESOPHAGUS 1CNTRST: CPT

## 2024-12-18 RX ADMIN — BARIUM SULFATE 100 ML: 960 POWDER, FOR SUSPENSION ORAL at 14:16

## 2024-12-18 ASSESSMENT — PAIN - FUNCTIONAL ASSESSMENT: PAIN_FUNCTIONAL_ASSESSMENT: 0-10

## 2024-12-18 NOTE — PROCEDURES
[facility]    Upper GI Procedure Report      India Lobo    Medical Record Number:[id number]    1965    Date of Service - December 18, 2024    Pre-Op Diagnosis - patient is status post gastric band placed over gastric bypass performed by this office 6 days ago with need for post-op UGI. They now present for UGI to assess their post surgical anatomy.    Post-Op Diagnosis -same    Procedure - UGI study with barium    Surgeon - Mike Martini MD    Assistant - None    Complications - None    Specimens - None    Implants - None    Estimate Blood Loss - None    Statement of Medical Necessity - Need for radiologic evaluation prior to further management of their care..    Procedure -the patient was brought to the fluoroscopy suite where they were given thin barium.  On swallowing the barium the patient was noted to have normal peristalsis of their esophagus with progressive flow into the distal esophagus.  Specific findings of the distal esophagus revealed that they did not have a hiatal hernia. Contrast flowed normally through the esophagus and into a properly sized gastric pouch surrounded by a properly placed gastric band without reflux or obstruction or signs of stricture. The pouch filled in a timely manner and emptied into the al limb without issue or hesitation. The anatomy was normal for the timeframe with no stricture or obstruction at the anastomosis or any other abnormality.  Given the benign findings of today's exam we will plan for her first band adjustment in the near future.    Mike Martini MD

## 2024-12-18 NOTE — PROGRESS NOTES
[facility] UGI FOCUS NOTE      Date:  12/18/2024  Time:  1:58 PM    Patient:  India Lobo  Procedure:  UGI      Patient Questions  Lap Band Adjustment Patient Questionnaire:  If female, are you pregnant? []Yes     [x]No  Tolerates thick liquids:  []Well   []1     [x]2     []3     []4     []5     []Poorly  Tolerates red meat:  []Well   []1     [x]2     []3     []4     []5     [] Poorly  Tolerates chicken:  []Well   []1     [x]2     []3     []4     []5     []Poorly  Tolerates fish:   []Well   [x]1     []2     []3     []4     []5     []Poorly  Hunger is:   []Well Controlled     []1     []2     [x]3     []4     []5      [] Poorly Controlled  Nightime regurgitation:  []Never     []1     [x]2     []3     []4     []5     []Frequently    Lap Band Info:  Band Type  []Realize     []Realize-C     []AP     []VG     []10cm     []Unknown  []Other      Comments:        Idania Dinh RN

## 2024-12-18 NOTE — TELEPHONE ENCOUNTER
Phone call with pt and she is complaining of constipation, constipation handout emailed and pt instructed to call if not feeling any better and pt expresses understanding.

## 2024-12-19 ENCOUNTER — TELEPHONE (OUTPATIENT)
Age: 59
End: 2024-12-19

## 2024-12-20 ENCOUNTER — OFFICE VISIT (OUTPATIENT)
Age: 59
End: 2024-12-20

## 2024-12-20 ENCOUNTER — HOSPITAL ENCOUNTER (OUTPATIENT)
Facility: HOSPITAL | Age: 59
Discharge: HOME | End: 2024-12-23

## 2024-12-20 VITALS
BODY MASS INDEX: 35.8 KG/M2 | HEART RATE: 68 BPM | TEMPERATURE: 97.1 F | WEIGHT: 209.7 LBS | DIASTOLIC BLOOD PRESSURE: 83 MMHG | HEIGHT: 64 IN | SYSTOLIC BLOOD PRESSURE: 151 MMHG | OXYGEN SATURATION: 100 %

## 2024-12-20 DIAGNOSIS — Z09 POSTOPERATIVE EXAMINATION: Primary | ICD-10-CM

## 2024-12-20 PROCEDURE — 99024 POSTOP FOLLOW-UP VISIT: CPT | Performed by: SPECIALIST

## 2024-12-20 NOTE — PROGRESS NOTES
Post-Op Nutrition Note  [facility]      Patient's Name: India Lobo Age: 59 y.o.   YOB: 1965 Sex: female     Pt attended nutrition education class on advancing their WLS post-op diet to the Second Stage, Soft/Puree.  Reviewed diet progression for weeks 3-4.    Reviewed pp. 43-48 of the patient post-op education book.     Discussed: post-op diet progression, including soft/pureed high protein, low fat, low sugar food recommendations; proper food group choices;  consumption of food and liquids, and consumption of adequate no-sugar, no-caffeine, no carbonation liquids. Pt was instructed to not progress her diet prior to 12/26/24.      We reviewed appropriate food choices, meal adaptations (use of smaller dishes/utensils, mechanical pureeing of food, eat slowly and chewing sufficiently for digestion), cooking techniques, and eating behavior modifications.  Proper meal timing reviewed to include having first meal within 2 hours of waking and eating a meal or snack every 3-4 hrs up until 2 hrs before bedtime.  Reinforced the importance of continuing chewable multivitamin & probiotic, adding B-50/B100 back into vitamin regimen, consuming at least 64 oz/d sugar-free/caffeine-free/non-carbonated fluids, and  g/d protein.  Stressed the importance of aiming for at least 150 min/wk of physical activity each day, increasing intensity as tolerated, with restricted lifting, to facilitate desired weight loss and bowel function.      Pt voiced understanding through class discussion.  All nutrition-related questions answered. Reminded pt that a RD would be calling them again at their 1 mo. post-op yessica.  The importance of not self-advancing pts diet was stressed. Pt provided with RD contact information for nutrition-related questions or concerns.    RD: Shakila Napoles, MS/KIM ALONSO

## 2025-01-13 ENCOUNTER — HOSPITAL ENCOUNTER (OUTPATIENT)
Facility: HOSPITAL | Age: 60
Discharge: HOME | End: 2025-01-16

## 2025-01-13 VITALS — WEIGHT: 206.7 LBS | HEIGHT: 64 IN | BODY MASS INDEX: 35.29 KG/M2

## 2025-01-13 NOTE — PROGRESS NOTES
Post-Op Nutrition Note  [facility]    Patient is a 59 y.o.  female approx. 1 mo S/P  laparoscopic adjustable band over gastric bypass  procedure.     Vitals:    01/13/25 1021   Weight: 93.8 kg (206 lb 11.2 oz)   Height: 1.626 m (5' 4\")      Body mass index is 35.48 kg/m².     Pre-op Wt: 211 lbs  EBW: 66 lbs    Pt has lost 4.3 lbs since surgery on 12/12/24. Pt has lost 7 % EBW.      Pt is currently on a soft/puree food diet without difficulty.  Patient is hydrating with  61 ounces, daily,  however, NOT all caffeine-free, 2 x 16 oz bottles water, 11.5 oz protein supplement shake  1 x 16 oz Margarita sweet tea.  Reports eating 2-3 meals/d, portion sizes are not measured, and meals take approximately 10-15 min to complete.  Patient is tolerating the following sources of protein:  lamb chops, breakfast turkey sausage, eggs, chicken, and crab cakes ([employer]). Other foods: fries, toast, ro greens, kale, cabbage, carrots.    Protein shake intake: 1 protein supplement shakes/day.   [] Premier (30 g PRO/svg)                                           [] Equate High Performance/Max (30 g PRO/svg)       [] Fairlife Nutrition Plan (30 g PRO/svg)                                 [] Muscle Milk Zero RTD (20 g PRO/svg)  [] Fairlife Core Power (26 g PRO/svg)                                    [] Muscle Milk Genuine Zero Sugar RTD (25 g PRO/svg)   [] Ensure Max protein (30 g PRO/svg)  [] Premier Clear (20 g PRO/svg)                                            [] Exvniod33 ( g PRO/svg)    [x] Other: Ensure  Original, Reminded pt that this product will cause  her to GAIN weight, she should ONLY use Ensure Max if drinking product from this brand of supplement shakes.    EXERCISE:  Patient is currently walking at stroll pace for exercise, 10-12 min/day x 2 d/wk.  Pt has increased her purposeful movements.       Patient [] has  [x] has not had any readmissions, re-operations, complications, ED visits, nor IVF at [facility]

## 2025-01-22 ENCOUNTER — HOSPITAL ENCOUNTER (OUTPATIENT)
Facility: HOSPITAL | Age: 60
Discharge: HOME | End: 2025-01-25
Payer: MEDICARE

## 2025-01-22 ENCOUNTER — HOSPITAL ENCOUNTER (OUTPATIENT)
Facility: HOSPITAL | Age: 60
Setting detail: OUTPATIENT SURGERY
Discharge: HOME | End: 2025-01-25
Payer: MEDICARE

## 2025-01-22 VITALS
HEART RATE: 84 BPM | HEIGHT: 64 IN | WEIGHT: 202.6 LBS | BODY MASS INDEX: 34.59 KG/M2 | DIASTOLIC BLOOD PRESSURE: 87 MMHG | RESPIRATION RATE: 18 BRPM | TEMPERATURE: 97.8 F | SYSTOLIC BLOOD PRESSURE: 144 MMHG

## 2025-01-22 DIAGNOSIS — E66.01 MORBID (SEVERE) OBESITY DUE TO EXCESS CALORIES: ICD-10-CM

## 2025-01-22 PROCEDURE — 74220 X-RAY XM ESOPHAGUS 1CNTRST: CPT

## 2025-01-22 PROCEDURE — 2500000003 HC RX 250 WO HCPCS: Performed by: SPECIALIST

## 2025-01-22 PROCEDURE — 43999 UNLISTED PROCEDURE STOMACH: CPT

## 2025-01-22 PROCEDURE — 6360000002 HC RX W HCPCS: Performed by: SPECIALIST

## 2025-01-22 RX ORDER — LIDOCAINE HYDROCHLORIDE 10 MG/ML
1 INJECTION, SOLUTION EPIDURAL; INFILTRATION; INTRACAUDAL; PERINEURAL ONCE
Status: COMPLETED | OUTPATIENT
Start: 2025-01-22 | End: 2025-01-22

## 2025-01-22 RX ADMIN — LIDOCAINE HYDROCHLORIDE 1 ML: 10 INJECTION, SOLUTION EPIDURAL; INFILTRATION; INTRACAUDAL; PERINEURAL at 13:28

## 2025-01-22 RX ADMIN — BARIUM SULFATE 100 ML: 960 POWDER, FOR SUSPENSION ORAL at 13:25

## 2025-01-22 NOTE — PROGRESS NOTES
[facility] UGI/LAP BAND FOCUS NOTE      Date:  1/22/2025  Time:  1:14 PM    Patient:  India Lobo  Procedure:  UGI/Lap Band Fill      Patient Questions  Lap Band Adjustment Patient Questionnaire:  If female, are you pregnant? []Yes     [x]No  Tolerates thick liquids:  []Well   []1     []2     [x]3     []4     []5     []Poorly  Tolerates red meat:  []Well   []1     []2     [x]3     []4     []5     [] Poorly  Tolerates chicken:  []Well   []1     []2     [x]3     []4     []5     []Poorly  Tolerates fish:   []Well   [x]1     []2     []3     []4     []5     []Poorly  Hunger is:   []Well Controlled     []1     []2     []3     []4     []5      [] Poorly Controlled  Nightime regurgitation:  []Never     [x]1     []2     []3     []4     []5     []Frequently    Lap Band Info:  Band Type  []Realize     []Realize-C     []AP     []VG     []10cm     []Unknown  []Other      Comments:        Idania Dinh RN

## 2025-01-22 NOTE — PROCEDURES
Lap Band Encounter (fluroscopy clinic)    India Lobo is gastric banding patient who had her procedure on  .  her weight today is 91.9 kg (202 lb 9.6 oz), which correlates to  % EBW loss.  she is here today for  .  she notes the following issues related to the banding procedure; - here for first adjustment of her band over bypass.      Surgery related complications; band over bypass    BP (!) 144/87   Pulse 84   Temp 97.8 °F (36.6 °C)   Resp 18   Ht 1.626 m (5' 4\")   Wt 91.9 kg (202 lb 9.6 oz)   PF 98 L/min   BMI 34.78 kg/m²     Past Medical History:   Diagnosis Date    Anesthesia     pt states needs head elevated when awaking from anesthesia/ states gasps and feels sob coming out of anesthesia    Anxiety     Arthritis     Chronic pain     legs knees headaches    Depression     Endometrial cancer (HCC)     history of - pt had hysterectomy    Exercise tolerance finding 2024    denies sob with 2 flights of stairs    History of gastric bypass     Hypertension     Intestinal malabsorption     Neuropathy     sees a neurologist    Severe obesity (BMI 35.0-39.9) with comorbidity     Sleep disorder     uses PO sleep aid     Past Surgical History:   Procedure Laterality Date    CARPAL TUNNEL RELEASE Left     CHOLECYSTECTOMY      GASTRIC BAND N/A 12/12/2024    PLACEMENT OF LAPAROSCOPIC GASTRIC BAND AND PORT OVER EXISTING GASTRIC BYPASS WITH LIVER WEDGE BIOPSY AND INTRAOPERATIVE ENDOSCOPY, LYSIS OF ADHESIONS FOR 40 MINS performed by Mike Martini MD at [facility] MAIN OR    GASTRIC BYPASS SURGERY  2005    [facility]    HYSTERECTOMY (CERVIX STATUS UNKNOWN)      ORTHOPEDIC SURGERY      LCTR    TOTAL KNEE ARTHROPLASTY Left      Current Outpatient Medications   Medication Sig Dispense Refill    albuterol sulfate HFA (PROVENTIL;VENTOLIN;PROAIR) 108 (90 Base) MCG/ACT inhaler       amLODIPine-valsartan (EXFORGE) 5-320 MG per tablet       QUEtiapine (SEROQUEL) 100 MG tablet       Prenatal MV-Min-Fe Fum-FA-DHA (PRENATAL

## 2025-01-29 NOTE — PROGRESS NOTES
[facility] UGI FOCUS NOTE      Date:  7/3/2024  Time:  1:05 PM    Patient:  India Lobo  Procedure:  UGI/Lap Band Fill      Patient Questions  Lap Band Adjustment Patient Questionnaire:  If female, are you pregnant? []Yes     [x]No  Tolerates thick liquids:  []Well   [x]1     []2     []3     []4     []5     []Poorly  Tolerates red meat:  []Well   [x]1     []2     []3     []4     []5     [] Poorly  Tolerates chicken:  []Well   [x]1     []2     []3     []4     []5     []Poorly  Tolerates fish:   []Well   [x]1     []2     []3     []4     []5     []Poorly  Hunger is:   []Well Controlled     []1     []2     [x]3     []4     []5      [] Poorly Controlled  Nightime regurgitation:  []Never     []1     []2     [x]3     []4     []5     []Frequently    Lap Band Info:  Band Type  []Realize     []Realize-C     []AP     []VG     []10cm     []Unknown  []Other      Comments:        Idania Dinh RN   room air

## 2025-02-13 ENCOUNTER — TELEPHONE (OUTPATIENT)
Age: 60
End: 2025-02-13

## 2025-02-13 NOTE — TELEPHONE ENCOUNTER
Patient contacted this RN, as she has had one band fill and doesn't feel restriction.  RN spoke with her about the need for a few fills before she will notice restriction.  Patient will make an appointment for a band fill next week.  She asked if she could resume her Xanax, and RN confirmed she could.

## 2025-03-05 ENCOUNTER — HOSPITAL ENCOUNTER (OUTPATIENT)
Facility: HOSPITAL | Age: 60
Discharge: HOME | End: 2025-03-08
Payer: MEDICARE

## 2025-03-05 ENCOUNTER — HOSPITAL ENCOUNTER (OUTPATIENT)
Facility: HOSPITAL | Age: 60
Setting detail: OUTPATIENT SURGERY
Discharge: HOME | End: 2025-03-08
Payer: MEDICARE

## 2025-03-05 VITALS
RESPIRATION RATE: 18 BRPM | HEART RATE: 92 BPM | WEIGHT: 204.2 LBS | BODY MASS INDEX: 34.86 KG/M2 | HEIGHT: 64 IN | DIASTOLIC BLOOD PRESSURE: 90 MMHG | SYSTOLIC BLOOD PRESSURE: 177 MMHG | TEMPERATURE: 97.8 F

## 2025-03-05 DIAGNOSIS — E66.01 MORBID OBESITY: ICD-10-CM

## 2025-03-05 PROCEDURE — 2500000003 HC RX 250 WO HCPCS: Performed by: SPECIALIST

## 2025-03-05 PROCEDURE — 43999 UNLISTED PROCEDURE STOMACH: CPT

## 2025-03-05 PROCEDURE — 74220 X-RAY XM ESOPHAGUS 1CNTRST: CPT

## 2025-03-05 PROCEDURE — 6360000002 HC RX W HCPCS: Performed by: SPECIALIST

## 2025-03-05 RX ORDER — LIDOCAINE HYDROCHLORIDE 10 MG/ML
1 INJECTION, SOLUTION EPIDURAL; INFILTRATION; INTRACAUDAL; PERINEURAL ONCE
Status: COMPLETED | OUTPATIENT
Start: 2025-03-05 | End: 2025-03-05

## 2025-03-05 RX ADMIN — BARIUM SULFATE 100 ML: 960 POWDER, FOR SUSPENSION ORAL at 13:29

## 2025-03-05 RX ADMIN — LIDOCAINE HYDROCHLORIDE 1 ML: 10 INJECTION, SOLUTION EPIDURAL; INFILTRATION; INTRACAUDAL; PERINEURAL at 13:31

## 2025-03-05 NOTE — PROCEDURES
breath or productive cough  Gastrointestinal - as noted above        Physical Exam:    General:  alert, appears stated age, cooperative, and no distress   Abdomen:   abdomen is soft without significant tenderness, masses, organomegaly or guarding; port in place   Incisions: healing well, no significant drainage       Assessment:     1. History of Morbid obesity, status post gastric banding, given the fluro findings we will proceed with the following adjustment.      Plan:     Previous Fill Volume:     Removed:      Total fill volume after today's adjustment:    Added:           2.2 cc adjusted to 4.3cc       Follow-up in PRN

## 2025-03-05 NOTE — PROGRESS NOTES
[facility] UGI/LAP BAND FOCUS NOTE      Date:  3/5/2025  Time:  1:27 PM    Patient:  India Lobo  Procedure:  UGI/Lap Band Fill      Patient Questions  Lap Band Adjustment Patient Questionnaire:  If female, are you pregnant? []Yes     [x]No  Tolerates thick liquids:  []Well   []1     []2     [x]3     []4     []5     []Poorly  Tolerates red meat:  []Well   []1     []2     [x]3     []4     []5     [] Poorly  Tolerates chicken:  []Well   []1     []2     [x]3     []4     []5     []Poorly  Tolerates fish:   []Well   []1     [x]2     []3     []4     []5     []Poorly  Hunger is:   []Well Controlled     []1     [x]2     []3     []4     []5      [] Poorly Controlled  Nightime regurgitation:  []Never     [x]1     []2     []3     []4     []5     []Frequently    Lap Band Info:  Band Type  []Realize     []Realize-C     []AP     []VG     []10cm     []Unknown  []Other      Comments:        Idania Dinh RN

## 2025-04-09 ENCOUNTER — HOSPITAL ENCOUNTER (OUTPATIENT)
Facility: HOSPITAL | Age: 60
Discharge: HOME | End: 2025-04-12
Payer: MEDICARE

## 2025-04-09 VITALS
HEART RATE: 68 BPM | SYSTOLIC BLOOD PRESSURE: 162 MMHG | TEMPERATURE: 97.1 F | WEIGHT: 206.3 LBS | BODY MASS INDEX: 35.22 KG/M2 | RESPIRATION RATE: 18 BRPM | DIASTOLIC BLOOD PRESSURE: 94 MMHG | HEIGHT: 64 IN

## 2025-04-09 DIAGNOSIS — E66.01 MORBID OBESITY: ICD-10-CM

## 2025-04-09 PROCEDURE — S2083 ADJUSTMENT GASTRIC BAND: HCPCS | Performed by: SPECIALIST

## 2025-04-09 PROCEDURE — 6360000002 HC RX W HCPCS: Performed by: SPECIALIST

## 2025-04-09 PROCEDURE — 99213 OFFICE O/P EST LOW 20 MIN: CPT | Performed by: SPECIALIST

## 2025-04-09 PROCEDURE — 2500000003 HC RX 250 WO HCPCS: Performed by: SPECIALIST

## 2025-04-09 PROCEDURE — 74220 X-RAY XM ESOPHAGUS 1CNTRST: CPT

## 2025-04-09 RX ORDER — LIDOCAINE HYDROCHLORIDE 10 MG/ML
1 INJECTION, SOLUTION EPIDURAL; INFILTRATION; INTRACAUDAL; PERINEURAL ONCE
Status: COMPLETED | OUTPATIENT
Start: 2025-04-09 | End: 2025-04-09

## 2025-04-09 RX ADMIN — LIDOCAINE HYDROCHLORIDE 1 ML: 10 INJECTION, SOLUTION EPIDURAL; INFILTRATION; INTRACAUDAL; PERINEURAL at 14:31

## 2025-04-09 RX ADMIN — BARIUM SULFATE 120 ML: 960 POWDER, FOR SUSPENSION ORAL at 14:27

## 2025-04-09 NOTE — PROCEDURES
abdomen is soft without significant tenderness, masses, organomegaly or guarding; port in place   Incisions: healing well, no significant drainage       Assessment:     1. History of Morbid obesity, status post gastric banding, given the fluro findings we will proceed with the following adjustment.      Plan:     Previous Fill Volume:     Removed:      Total fill volume after today's adjustment:    Added:           4.3cc adjusted to 5.4cc       Follow-up in PRN

## 2025-07-02 ENCOUNTER — HOSPITAL ENCOUNTER (OUTPATIENT)
Facility: HOSPITAL | Age: 60
Discharge: HOME | End: 2025-07-05
Payer: MEDICARE

## 2025-07-02 VITALS
WEIGHT: 209.2 LBS | SYSTOLIC BLOOD PRESSURE: 126 MMHG | TEMPERATURE: 97.8 F | DIASTOLIC BLOOD PRESSURE: 86 MMHG | HEIGHT: 64 IN | HEART RATE: 76 BPM | RESPIRATION RATE: 18 BRPM | BODY MASS INDEX: 35.71 KG/M2

## 2025-07-02 DIAGNOSIS — E66.01 MORBID OBESITY (HCC): ICD-10-CM

## 2025-07-02 PROCEDURE — 2500000003 HC RX 250 WO HCPCS: Performed by: SPECIALIST

## 2025-07-02 PROCEDURE — 74220 X-RAY XM ESOPHAGUS 1CNTRST: CPT

## 2025-07-02 PROCEDURE — 43999 UNLISTED PROCEDURE STOMACH: CPT

## 2025-07-02 PROCEDURE — S2083 ADJUSTMENT GASTRIC BAND: HCPCS | Performed by: SPECIALIST

## 2025-07-02 PROCEDURE — 6360000002 HC RX W HCPCS: Performed by: SPECIALIST

## 2025-07-02 PROCEDURE — 99213 OFFICE O/P EST LOW 20 MIN: CPT | Performed by: SPECIALIST

## 2025-07-02 RX ORDER — LIDOCAINE HYDROCHLORIDE 10 MG/ML
1 INJECTION, SOLUTION EPIDURAL; INFILTRATION; INTRACAUDAL; PERINEURAL ONCE
Status: COMPLETED | OUTPATIENT
Start: 2025-07-02 | End: 2025-07-02

## 2025-07-02 RX ADMIN — BARIUM SULFATE 100 ML: 960 POWDER, FOR SUSPENSION ORAL at 14:13

## 2025-07-02 RX ADMIN — LIDOCAINE HYDROCHLORIDE 1 ML: 10 INJECTION, SOLUTION EPIDURAL; INFILTRATION; INTRACAUDAL; PERINEURAL at 14:15

## 2025-07-02 NOTE — PROGRESS NOTES
[facility] UGI/LAP BAND FOCUS NOTE      Date:  7/2/2025  Time:  2:13 PM    Patient:  India Lobo  Procedure:  UGI/Lap Band Fill      Patient Questions  Lap Band Adjustment Patient Questionnaire:  If female, are you pregnant? []Yes     [x]No  Tolerates thick liquids:  []Well   []1     [x]2     []3     []4     []5     []Poorly  Tolerates red meat:  []Well   []1     []2     [x]3     []4     []5     [] Poorly  Tolerates chicken:  []Well   []1     []2     [x]3     []4     []5     []Poorly  Tolerates fish:   []Well   []1     []2     [x]3     []4     []5     []Poorly  Hunger is:   []Well Controlled     []1     []2     [x]3     []4     []5      [] Poorly Controlled  Nightime regurgitation:  []Never     [x]1     []2     []3     []4     []5     []Frequently    Lap Band Info:  Band Type  []Realize     []Realize-C     []AP     []VG     []10cm     []Unknown  []Other      Comments:        Idania Dinh RN

## 2025-07-02 NOTE — PROCEDURES
Lap Band Encounter (fluroscopy clinic)    India Lobo is gastric banding patient who had her procedure on  .  her weight today is 94.9 kg (209 lb 3.2 oz), which correlates to  % EBW loss.  she is here today for  .  she notes the following issues related to the banding procedure; - here for routine adjustment.      Surgery related complications; band over bypass    /86   Pulse 76   Temp 97.8 °F (36.6 °C)   Resp 18   Ht 1.626 m (5' 4\")   Wt 94.9 kg (209 lb 3.2 oz)   PF 99 L/min   BMI 35.91 kg/m²     Past Medical History:   Diagnosis Date    Anesthesia     pt states needs head elevated when awaking from anesthesia/ states gasps and feels sob coming out of anesthesia    Anxiety     Arthritis     Chronic pain     legs knees headaches    Depression     Endometrial cancer (HCC)     history of - pt had hysterectomy    Exercise tolerance finding 2024    denies sob with 2 flights of stairs    History of gastric bypass     Hypertension     Intestinal malabsorption     Neuropathy     sees a neurologist    Severe obesity (BMI 35.0-39.9) with comorbidity (HCC)     Sleep disorder     uses PO sleep aid     Past Surgical History:   Procedure Laterality Date    CARPAL TUNNEL RELEASE Left     CHOLECYSTECTOMY      GASTRIC BAND N/A 12/12/2024    PLACEMENT OF LAPAROSCOPIC GASTRIC BAND AND PORT OVER EXISTING GASTRIC BYPASS WITH LIVER WEDGE BIOPSY AND INTRAOPERATIVE ENDOSCOPY, LYSIS OF ADHESIONS FOR 40 MINS performed by Mike Martini MD at [facility] MAIN OR    GASTRIC BYPASS SURGERY  2005    [facility]    HYSTERECTOMY (CERVIX STATUS UNKNOWN)      ORTHOPEDIC SURGERY      LCTR    TOTAL KNEE ARTHROPLASTY Left      Current Outpatient Medications   Medication Sig Dispense Refill    albuterol sulfate HFA (PROVENTIL;VENTOLIN;PROAIR) 108 (90 Base) MCG/ACT inhaler       amLODIPine-valsartan (EXFORGE) 5-320 MG per tablet       QUEtiapine (SEROQUEL) 100 MG tablet       Prenatal MV-Min-Fe Fum-FA-DHA (PRENATAL 1 PO) Take by mouth

## 2025-07-16 ENCOUNTER — HOSPITAL ENCOUNTER (OUTPATIENT)
Facility: HOSPITAL | Age: 60
Setting detail: OUTPATIENT SURGERY
Discharge: HOME | End: 2025-07-19
Payer: MEDICARE

## 2025-07-16 ENCOUNTER — HOSPITAL ENCOUNTER (OUTPATIENT)
Facility: HOSPITAL | Age: 60
Discharge: HOME | End: 2025-07-19
Payer: MEDICARE

## 2025-07-16 VITALS
RESPIRATION RATE: 18 BRPM | HEIGHT: 64 IN | DIASTOLIC BLOOD PRESSURE: 98 MMHG | WEIGHT: 205.4 LBS | TEMPERATURE: 97.8 F | BODY MASS INDEX: 35.07 KG/M2 | SYSTOLIC BLOOD PRESSURE: 138 MMHG | HEART RATE: 69 BPM

## 2025-07-16 DIAGNOSIS — E66.01 MORBID OBESITY (HCC): ICD-10-CM

## 2025-07-16 PROBLEM — Z46.51: Status: ACTIVE | Noted: 2025-07-16

## 2025-07-16 PROBLEM — R13.10 DYSPHAGIA: Status: ACTIVE | Noted: 2025-07-16

## 2025-07-16 PROCEDURE — 74220 X-RAY XM ESOPHAGUS 1CNTRST: CPT

## 2025-07-16 PROCEDURE — 99213 OFFICE O/P EST LOW 20 MIN: CPT | Performed by: SPECIALIST

## 2025-07-16 PROCEDURE — S2083 ADJUSTMENT GASTRIC BAND: HCPCS | Performed by: SPECIALIST

## 2025-07-16 PROCEDURE — 43999 UNLISTED PROCEDURE STOMACH: CPT

## 2025-07-16 PROCEDURE — 6360000002 HC RX W HCPCS: Performed by: SPECIALIST

## 2025-07-16 PROCEDURE — 2500000003 HC RX 250 WO HCPCS: Performed by: SPECIALIST

## 2025-07-16 RX ORDER — LIDOCAINE HYDROCHLORIDE 10 MG/ML
1 INJECTION, SOLUTION EPIDURAL; INFILTRATION; INTRACAUDAL; PERINEURAL ONCE
Status: COMPLETED | OUTPATIENT
Start: 2025-07-16 | End: 2025-07-16

## 2025-07-16 RX ADMIN — BARIUM SULFATE 100 ML: 960 POWDER, FOR SUSPENSION ORAL at 14:57

## 2025-07-16 RX ADMIN — LIDOCAINE HYDROCHLORIDE 1 ML: 10 INJECTION, SOLUTION EPIDURAL; INFILTRATION; INTRACAUDAL; PERINEURAL at 15:00

## 2025-07-16 NOTE — PROGRESS NOTES
[facility] UGI/LAP BAND FOCUS NOTE      Date:  7/16/2025  Time:  2:56 PM    Patient:  India Lobo  Procedure:  UGI/Lap Band Fill      Patient Questions  Lap Band Adjustment Patient Questionnaire:  If female, are you pregnant? []Yes     []No  Tolerates thick liquids:  []Well   []1     []2     [x]3     []4     []5     []Poorly  Tolerates red meat:  []Well   []1     []2     [x]3     []4     []5     [] Poorly  Tolerates chicken:  []Well   []1     []2     [x]3     []4     []5     []Poorly  Tolerates fish:   []Well   []1     []2     [x]3     []4     []5     []Poorly  Hunger is:   []Well Controlled     []1     []2     [x]3     []4     []5      [] Poorly Controlled  Nightime regurgitation:  []Never     []1     [x]2     []3     []4     []5     []Frequently    Lap Band Info:  Band Type  []Realize     []Realize-C     []AP     []VG     []10cm     []Unknown  []Other      Comments:        Idania Dinh RN

## 2025-07-16 NOTE — PROCEDURES
Lap Band Encounter (fluroscopy clinic)    India Lobo is gastric banding patient who had her procedure on  .  her weight today is 93.2 kg (205 lb 6.4 oz), which correlates to  % EBW loss.  she is here today for  .  she notes the following issues related to the banding procedure; - here with obstructive type issues following adjustment earlier this month.      Surgery related complications; NA    BP (!) 138/98   Pulse 69   Temp 97.8 °F (36.6 °C)   Resp 18   Ht 1.626 m (5' 4\")   Wt 93.2 kg (205 lb 6.4 oz)   PF 97 L/min   BMI 35.26 kg/m²     Past Medical History:   Diagnosis Date    Anesthesia     pt states needs head elevated when awaking from anesthesia/ states gasps and feels sob coming out of anesthesia    Anxiety     Arthritis     Chronic pain     legs knees headaches    Depression     Endometrial cancer (HCC)     history of - pt had hysterectomy    Exercise tolerance finding 2024    denies sob with 2 flights of stairs    History of gastric bypass     Hypertension     Intestinal malabsorption     Neuropathy     sees a neurologist    Severe obesity (BMI 35.0-39.9) with comorbidity (HCC)     Sleep disorder     uses PO sleep aid     Past Surgical History:   Procedure Laterality Date    CARPAL TUNNEL RELEASE Left     CHOLECYSTECTOMY      GASTRIC BAND N/A 12/12/2024    PLACEMENT OF LAPAROSCOPIC GASTRIC BAND AND PORT OVER EXISTING GASTRIC BYPASS WITH LIVER WEDGE BIOPSY AND INTRAOPERATIVE ENDOSCOPY, LYSIS OF ADHESIONS FOR 40 MINS performed by Mike Martini MD at [facility] MAIN OR    GASTRIC BYPASS SURGERY  2005    [facility]    HYSTERECTOMY (CERVIX STATUS UNKNOWN)      ORTHOPEDIC SURGERY      LCTR    TOTAL KNEE ARTHROPLASTY Left      Current Outpatient Medications   Medication Sig Dispense Refill    albuterol sulfate HFA (PROVENTIL;VENTOLIN;PROAIR) 108 (90 Base) MCG/ACT inhaler       amLODIPine-valsartan (EXFORGE) 5-320 MG per tablet       QUEtiapine (SEROQUEL) 100 MG tablet       Prenatal MV-Min-Fe

## (undated) DEVICE — GARMENT,MEDLINE,DVT,INT,CALF,MED, GEN2: Brand: MEDLINE

## (undated) DEVICE — 3M™ UNIVERSAL ELECTROSURGICAL PLATE, SPLIT, UNCORDED 9160: Brand: 3M™

## (undated) DEVICE — AGENT HEMSTAT W6XL9IN OXIDIZED REGENERATED CELOS ABSRB FOR

## (undated) DEVICE — ELECTRODE PT RET AD L9FT HI MOIST COND ADH HYDRGEL CORDED

## (undated) DEVICE — SOLUTION IRRIG 500ML 0.9% SOD CHLO USP POUR PLAS BTL

## (undated) DEVICE — BARIATRIC: Brand: MEDLINE INDUSTRIES, INC.

## (undated) DEVICE — TROCAR ENDOSCP L100MM DIA15MM BLDELSS STBL SL ENDOPATH XCEL

## (undated) DEVICE — ENDOCUT SCISSOR TIP, DISPOSABLE: Brand: RENEW

## (undated) DEVICE — TROCAR LAP L100MM DIA5MM BLDELSS W/ STBL SL ENDOPATH XCEL

## (undated) DEVICE — SHEAR HARMONIC VES SEAL 360 DEG SHFT L 45 MM DIA 5 MM JAW L

## (undated) DEVICE — GLOVE ORANGE PI 8   MSG9080

## (undated) DEVICE — SOLUTION SURG PREP 26 CC PURPREP

## (undated) DEVICE — SUTURE ETHIBOND EXCL BR GRN TAPR PT 2-0 30 X563H X563H

## (undated) DEVICE — TROCAR ENDOSCP BLDELSS 12X100 MM W/ HNDL STBL SL OPT TIP

## (undated) DEVICE — GLOVE ORANGE PI 7 1/2   MSG9075

## (undated) DEVICE — STRIP SKIN CLSR W1XL5IN NYLON REINF CURAD STERIL

## (undated) DEVICE — NEEDLE INSUF L150MM DIA2MM DISP FOR PNEUMOPERI ENDOPATH

## (undated) DEVICE — RAPIDPORT EZ APPLIER TOOL: Brand: RAPIDPORT EZ

## (undated) DEVICE — SLEEVE TRCR L100MM DIA5MM UNIV STBL FOR BLDELSS DIL TIP

## (undated) DEVICE — CATHETER,URETHRAL,REDRUBBER,STRL,12FR: Brand: MEDLINE INDUSTRIES, INC.

## (undated) DEVICE — SUTURE MONOCRYL + SZ 4-0 L27IN ABSRB UD L19MM PS-2 3/8 CIR MCP426H